# Patient Record
Sex: FEMALE | Race: WHITE | NOT HISPANIC OR LATINO | Employment: FULL TIME | URBAN - METROPOLITAN AREA
[De-identification: names, ages, dates, MRNs, and addresses within clinical notes are randomized per-mention and may not be internally consistent; named-entity substitution may affect disease eponyms.]

---

## 2018-10-02 LAB
ABO + RH BLD: NORMAL
BLD GP AB SCN SERPL QL: NEGATIVE
C TRACH DNA SPEC QL PROBE+SIG AMP: NEGATIVE
HBV SURFACE AG SERPL QL IA: NONREACTIVE
HEMOGLOBIN: 12.4 G/DL (ref 11.7–15.7)
HIV 1+2 AB+HIV1 P24 AG SERPL QL IA: NONREACTIVE
PLATELET # BLD AUTO: 223 10^9/L
RUBELLA ABY IGG: NORMAL
TREPONEMA ANTIBODIES: NEGATIVE

## 2019-01-17 ENCOUNTER — PRENATAL OFFICE VISIT (OUTPATIENT)
Dept: MIDWIFE SERVICES | Facility: CLINIC | Age: 34
End: 2019-01-17
Payer: COMMERCIAL

## 2019-01-17 VITALS
WEIGHT: 132.2 LBS | DIASTOLIC BLOOD PRESSURE: 62 MMHG | HEIGHT: 64 IN | SYSTOLIC BLOOD PRESSURE: 110 MMHG | BODY MASS INDEX: 22.57 KG/M2

## 2019-01-17 DIAGNOSIS — Z29.13 NEED FOR RHOGAM DUE TO RH NEGATIVE MOTHER: ICD-10-CM

## 2019-01-17 DIAGNOSIS — O09.92 SUPERVISION OF HIGH RISK PREGNANCY IN SECOND TRIMESTER: Primary | ICD-10-CM

## 2019-01-17 DIAGNOSIS — K50.90 CROHN'S DISEASE WITHOUT COMPLICATION, UNSPECIFIED GASTROINTESTINAL TRACT LOCATION (H): ICD-10-CM

## 2019-01-17 DIAGNOSIS — Q76.0 SPINA BIFIDA OCCULTA: ICD-10-CM

## 2019-01-17 DIAGNOSIS — Z20.828 EXPOSURE TO CYTOMEGALOVIRUS (CMV): ICD-10-CM

## 2019-01-17 DIAGNOSIS — M43.10 SPONDYLOLISTHESIS, UNSPECIFIED SPINAL REGION: ICD-10-CM

## 2019-01-17 DIAGNOSIS — Z23 NEED FOR TDAP VACCINATION: ICD-10-CM

## 2019-01-17 DIAGNOSIS — Z87.898 HISTORY OF ULCER DISEASE: ICD-10-CM

## 2019-01-17 DIAGNOSIS — Z3A.24 24 WEEKS GESTATION OF PREGNANCY: ICD-10-CM

## 2019-01-17 PROBLEM — Z12.4 CERVICAL CANCER SCREENING: Status: ACTIVE | Noted: 2019-01-17

## 2019-01-17 PROBLEM — Z34.00 SUPERVISION OF NORMAL IUP (INTRAUTERINE PREGNANCY) IN PRIMIGRAVIDA: Status: ACTIVE | Noted: 2019-01-17

## 2019-01-17 PROCEDURE — 86644 CMV ANTIBODY: CPT | Performed by: ADVANCED PRACTICE MIDWIFE

## 2019-01-17 PROCEDURE — 36415 COLL VENOUS BLD VENIPUNCTURE: CPT | Performed by: ADVANCED PRACTICE MIDWIFE

## 2019-01-17 PROCEDURE — 86645 CMV ANTIBODY IGM: CPT | Performed by: ADVANCED PRACTICE MIDWIFE

## 2019-01-17 PROCEDURE — 99207 ZZC FIRST OB VISIT: CPT | Performed by: ADVANCED PRACTICE MIDWIFE

## 2019-01-17 RX ORDER — CHLORAL HYDRATE 500 MG
1 CAPSULE ORAL DAILY
COMMUNITY
End: 2020-05-18

## 2019-01-17 SDOH — HEALTH STABILITY: MENTAL HEALTH: HOW OFTEN DO YOU HAVE A DRINK CONTAINING ALCOHOL?: NEVER

## 2019-01-17 ASSESSMENT — MIFFLIN-ST. JEOR: SCORE: 1294.66

## 2019-01-17 ASSESSMENT — PATIENT HEALTH QUESTIONNAIRE - PHQ9
5. POOR APPETITE OR OVEREATING: NOT AT ALL
SUM OF ALL RESPONSES TO PHQ QUESTIONS 1-9: 0

## 2019-01-17 ASSESSMENT — ANXIETY QUESTIONNAIRES
3. WORRYING TOO MUCH ABOUT DIFFERENT THINGS: NOT AT ALL
6. BECOMING EASILY ANNOYED OR IRRITABLE: NOT AT ALL
7. FEELING AFRAID AS IF SOMETHING AWFUL MIGHT HAPPEN: NOT AT ALL
5. BEING SO RESTLESS THAT IT IS HARD TO SIT STILL: NOT AT ALL
1. FEELING NERVOUS, ANXIOUS, OR ON EDGE: NOT AT ALL
IF YOU CHECKED OFF ANY PROBLEMS ON THIS QUESTIONNAIRE, HOW DIFFICULT HAVE THESE PROBLEMS MADE IT FOR YOU TO DO YOUR WORK, TAKE CARE OF THINGS AT HOME, OR GET ALONG WITH OTHER PEOPLE: NOT DIFFICULT AT ALL
2. NOT BEING ABLE TO STOP OR CONTROL WORRYING: NOT AT ALL
GAD7 TOTAL SCORE: 0

## 2019-01-17 NOTE — PATIENT INSTRUCTIONS
Thank you for coming to see the Midwives at the   [facility]!      We will notify you about your labs that were drawn today once we get the results back.  If you have [facility] your lab results will be posted there.      Someone from the clinic will call you personally or send you a [employer] message with your results.      If you need any refills of medications please call your pharmacy and they will contact us.      If you have a medical emergency please call 911.      If you have any concerns about today's visit, wish to schedule another appointment, or have an urgent medical concern please call our office at [phone]. You can also make appointments through [employer].      After hours you may also call the clinic number above to be connected with [place]'s after hours triage nurse.  The nurse can page the midwife on call if needed. There is always a midwife on call 24 hours a day.    Prenatal Care Recommendations:    Before 14 weeks: Dating ultrasound, genetic testing       This ultrasound helps us determine your dates accurately. Innatal (genetic screening test) can be drawn anytime after 10 weeks of gestation.    16 weeks: Optional genetic testing single AFP       This testing helps understand your baby's risk for some genetic abnormalities.    18-22 weeks:  Screening anatomy ultrasound       This testing will look for early growth abnormalities, placenta location, and may tell the baby's gender if you wish to find out.    24-27 weeks: One hour diabetes test (GCT) and complete blood count       This test helps identify diabetes of pregnancy or gestational diabetes.  We also look   at the iron in your blood and how well your blood clots.    28 - 36 weeks: Tetanus shot (Tdap)       This shot helps protect you and your baby from whooping cough.    36 weeks and later: Group B Strep test (GBS)       This test helps predict if you need antibiotics in labor to prevent infection for your baby.    Anytime  September to April:  Flu shot       This shot helps protect you and your family from the flu.  This is especially important during pregnancy.        The typical schedule after your first visit today you can expect:     Visit 2 - 12-16 weeks  Visit 3 - 20 weeks  Visit 4 - 24 weeks  Visit 5 - 28 weeks  Visit 6 - 30 weeks  Visit 7 - 32 weeks  Visit 8 - 34 weeks  Visit 9 - 36 weeks  Weekly after 36 weeks until delivery.        Any time during or after your pregnancy you may experience increased depression and/or mood changes.    We are here to support you. Please contact us if you are:    Feeling anxious    Overwhelmed or sad     Trouble sleeping    Crying uncontrollably    Trouble caring for yourself or baby.    Any thoughts of hurting yourself, your baby, or anyone else    If anything comes up between your visits or you have concerns please don't hesitate to contact us.    Secure access to your medical record:  Use [employer] (secure email communication and access to your chart) to send your primary care provider a message or make an appointment. Ask someone on your Team how to sign up for Storehouse. To log on to Thanx or for more information in Storehouse please visit the website at [email or web address].       Certified Nurse Midwife (CNM) Team    Emperatriz NUÑEZ, SARBJIT Santillan DNP, JOAQUIN, SARBJIT NUÑEZ, SARBJIT Houston, APRN, CNM, [facility]  Jhonny Sheridan, PEDRO, APRN, CNM      Again, thank you for choosing the midwives at [facility].  We are excited to be a part of your pregnancy. Please let us know how we can best partner with you to improve your and your family's health.        SIGNS OF  LABOR    Labor is  if it happens more than three weeks before your due date.    It can be hard to know if you are in labor, since the symptoms can be like the normal feelings of pregnancy.  Often, the only difference is the symptoms increase or they don't go away.     Signs of   labor can include:      Contractions which can feel like period cramps or gas pain.  You may feel it in the lower part of your abdomen, in your back, or as a pressure feeling in your bottom.  It is often regular, coming every 5 or 10 minutes, and  lasting about 30-60 seconds. Some contractions are normal during pregnancy ([place] uriarte contractions) but if you are feeling more than 5-6 in one hour that is NOT normal    If this occurs empty your bladder, then drink 2-3 glasses of water, eat a snack, and lay down on your left side. Put your hand on your abdomen to count the contractions.  If after one hour of resting you have still had 5-6 contractions call your clinic right away.      If you feel a pop, gush, or trickle of fluid it may mean that your bag of water has broken and you should contact the clinic       You may also experience loose stools and/or rectal pressure       Listen to your body, if something doesn't seem right please call us at the clinic    Risk Factors      Previous  delivery    Bacterial Vaginosis- if you notice a fishy smell to your discharge or experience vaginal itching/discomfort you should be evaluated for infection    Smoking    Drug abuse    Adolescent (teen) pregnancy or advanced maternal age (AMA) age 35 and over    Dehydration (this may not cause  labor but it can cause contractions)    If you think you are in  labor we may do some lab testing in the clinic or send you to the hospital for evaluation    Please call us if you are concerned you are in  labor.    [facility]  [phone]          GESTATIONAL DIABETES SCREENING    All pregnant women are screened at least once for diabetes as part of their prenatal care. A woman has gestational diabetes if she has high blood sugars for the first time during pregnancy.      Diabetes can harm your health and the health of your baby.  But if we find the diabetes early in pregnancy we will  watch your health closely and prevent further problems.       We will check for gestational diabetes during your visit between 24-28 weeks visit. Please note you can not do this prior to 24 weeks of gestation.      Plan to spend about an hour at the clinic.  When you check in let us know that you will be having your diabetes screening that day.       We will give you a 50 gram glucose drink that you have 5 minutes to consume.  Exactly one hour later you will have draw blood from your arm to check your blood sugar level.      We will call you to let you know if your results are normal.  If the results are normal no more testing will be needed.  If your results are not normal we will discuss follow up testing with you.        You may eat prior to the testing but it is not recommended to eat or drink very sweet things such as pop, juice, candy or dessert type foods.  Eat a high protein, low carb meals prior to testing.    If you have any questions please call:    [facility]    [phone]

## 2019-01-17 NOTE — NURSING NOTE
Please abstract the following data from this visit with this patient into the appropriate field in Epic:    Pap smear done on this date: 5/18/2016 (approximately), by this group: [facility], results were NIL.     Please update chart with care everywhere lab results.  Routing to HIM

## 2019-01-17 NOTE — PROGRESS NOTES
"SUBJECTIVE:     HPI:    This is a 33 year old female patient,  who presents for her first obstetrical visit.    WOLF: 2019, by Last Menstrual Period.  She is 24w4d weeks.  Her cycles are regular.  Her last menstrual period was normal.  Since her LMP, she has experienced  constipation and upper GI-\"stuck in her throat\").   She denies nausea, emesis, abdominal pain, fatigue, headache, loss of appetite, vaginal discharge, dysuria, pelvic pain, urinary urgency, lightheadedness, urinary frequency, vaginal bleeding and hemorrhoids.    Additional History: Pt has Spina bifida occulta.    Last PAP:  2016, NIL without cotest  History of abnormal Pap?  No  Next PAP due: 2019, will collect postpartum    Health maintenance updated:  yes    INFECTION HISTORY  HIV: No  Hepatitis B: No  Hepatitis C: No  Tuberculosis: No    Genital Herpes self: no  Herpes partner:  no  Chlamydia:  no  Gonorrhea:  no  HPV: No  BV:  No  Syphilis:  No  Chicken Pox:  Yes - as a child      Feels well  Fetal movement: positive   Denies loss of fluid/vb/contractions  GCT next visit, handout provided, reminded of longer appointment  Tdap next visit; reviewed CDC recommendations and partner/family vaccination recommended as well  Water birth discussed, patient is somewhat interested  Need for Rhogam? Yes, to be done next visit       Have you travelled during the pregnancy?No  Have your sexual partner(s) travelled during the pregnancy?No      HISTORY:   Planned Pregnancy: Yes  Marital Status:   Occupation: RN at [employer]  Living in Household: Spouse    Past History:  Her past medical history   Past Medical History:   Diagnosis Date     Crohn's disease (H)      Spina bifida occulta 2009     Spondylisthesis 2009     Tremor 2017     Ulcer of jejunum 10/2008   .      She has a history of  First Pregnancy    Since her last LMP she denies use of alcohol, tobacco and street drugs.    Past medical, surgical, social and family " "history were reviewed and updated in [facility].        Current Outpatient Medications   Medication     fish oil-omega-3 fatty acids (OMEGA-3 FISH OIL) 1000 MG capsule     Prenatal Multivit-Min-Fe-FA (PRENATAL VITAMINS PO)     No current facility-administered medications for this visit.        ROS:   12 point review of systems negative other than symptoms noted below.  Gastrointestinal: Constipation      OBJECTIVE:     EXAM:  /62   Ht 1.626 m (5' 4\")   Wt 60 kg (132 lb 3.2 oz)   LMP 2018   BMI 22.69 kg/m   Body mass index is 22.69 kg/m .    GENERAL: healthy, alert and no distress  NECK: no adenopathy, no asymmetry, masses, or scars and thyroid normal to palpation  RESP: lungs clear to auscultation - no rales, rhonchi or wheezes  CV: regular rate and rhythm, normal S1 S2, no S3 or S4, no murmur, click or rub, no peripheral edema   ABDOMEN: gravid  NEURO: Normal strength and tone, mentation intact and speech normal  PSYCH: mentation appears normal, affect normal/bright    ASSESSMENT/PLAN:       ICD-10-CM    1. Supervision of high risk pregnancy in second trimester O09.92    2. 24 weeks gestation of pregnancy Z3A.24    3. Spina bifida occulta Q76.0    4. Crohn's disease without complication, unspecified gastrointestinal tract location (H) K50.90    5. Spondylolisthesis, unspecified spinal region M43.10    6. History of ulcer disease Z87.898    7. Need for Tdap vaccination Z23    8. Exposure to cytomegalovirus (CMV) Z20.828 CMV Antibody IgG     CMV antibody IgM       33 year old , 24w4d weeks of pregnancy with WOLF of 2019, by Last Menstrual Period    Discussed as follows:   -Shiraz reports increased nosebleeds in pregnancy.  Discussed increased epistaxis during pregnancy and recommended humidified air in home.  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 Has not had symptoms. CMV IgG and IgM testing ordered.   -Shiraz is unsure about pain management plans in labor " "at this time.  Discussed that we will plan an early anesthesia consult in labor regardless of pain management plans.  -She states that, at times, she randomly feels like she has \"something stuck\" in her throat.  \"I'm unsure if it's heartburn, I've never had heartburn before\".  Resolves spontaneously.  Gave OB med list and recommended trying an antacid and monitoring symptoms.  -Discussed CNM service and how to get in touch with on-call CNM at any time.    Counseling given:   - Follow up in 3-4 weeks for return OB visit.  - Recommended weight gain for pregnancy: 25-35 lbs.  -Gave GCT handout  -Discussed PTL precautions and gave handout  -GCT, CBC, Rhogam and TDAP next visit      JOAQUIN Weinberg CNM    Prenatal OB Questionnaire      Allergies as of 1/17/2019:    Allergies as of 01/17/2019     (No Known Allergies)       Current medications are:  Current Outpatient Medications   Medication Sig Dispense Refill     fish oil-omega-3 fatty acids (OMEGA-3 FISH OIL) 1000 MG capsule Take 1 g by mouth daily       Prenatal Multivit-Min-Fe-FA (PRENATAL VITAMINS PO)            Early ultrasound screening tool:    Does patient have irregular periods?  No  Did patient use hormonal birth control in the three months prior to positive urine pregnancy test? No  Is the patient breastfeeding?  No  Is the patient 10 weeks or greater at time of education visit?  Yes     Had Level 2 US at 19w and normal  Transfer of Care from [facility] at 24w4d    "

## 2019-01-18 ASSESSMENT — ANXIETY QUESTIONNAIRES: GAD7 TOTAL SCORE: 0

## 2019-01-19 ENCOUNTER — NURSE TRIAGE (OUTPATIENT)
Dept: NURSING | Facility: CLINIC | Age: 34
End: 2019-01-19

## 2019-01-19 LAB
CMV IGG SERPL QL IA: 0.2 AI (ref 0–0.8)
CMV IGM SERPL QL IA: <0.2 AI (ref 0–0.8)

## 2019-01-19 NOTE — TELEPHONE ENCOUNTER
"  Reason for call; Pt received a call from Emperatriz matthew [facility] that Lab - CMV was negative but had follow up question.   Recommendation / teaching ; Smart web page at 129 to midwife Emperatriz Matthew :\" Please leave detailed message at [phone] Re :Shiraz Boykin MRN: [id number] BD 1985, WOLF 5/5/19 . ? on CMV lab = am I immune to CMV or not ? Lab CMV was neg but what does mean for immunity ?\"        Caller Verbalizes understanding and denies further questions and will call back if further symptoms to triage or questions  .  Chelsea Gregory RN  - [place] Nurse Advisor                  "

## 2019-01-29 DIAGNOSIS — Z29.13 NEED FOR RHOGAM DUE TO RH NEGATIVE MOTHER: Primary | ICD-10-CM

## 2019-01-29 DIAGNOSIS — Z23 NEED FOR TDAP VACCINATION: ICD-10-CM

## 2019-01-29 DIAGNOSIS — Z36.9 ENCOUNTER FOR ANTENATAL SCREENING OF MOTHER: ICD-10-CM
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
## 2019-02-05 ENCOUNTER — PRENATAL OFFICE VISIT (OUTPATIENT)
Dept: MIDWIFE SERVICES | Facility: CLINIC | Age: 34
End: 2019-02-05
Payer: COMMERCIAL

## 2019-02-05 VITALS — BODY MASS INDEX: 23.34 KG/M2 | WEIGHT: 136 LBS | SYSTOLIC BLOOD PRESSURE: 106 MMHG | DIASTOLIC BLOOD PRESSURE: 60 MMHG

## 2019-02-05 DIAGNOSIS — O09.92 SUPERVISION OF HIGH RISK PREGNANCY IN SECOND TRIMESTER: ICD-10-CM

## 2019-02-05 DIAGNOSIS — Z23 NEED FOR TDAP VACCINATION: ICD-10-CM

## 2019-02-05 DIAGNOSIS — Z3A.27 27 WEEKS GESTATION OF PREGNANCY: ICD-10-CM

## 2019-02-05 DIAGNOSIS — Z36.9 ENCOUNTER FOR ANTENATAL SCREENING OF MOTHER: ICD-10-CM

## 2019-02-05 DIAGNOSIS — O09.92 SUPERVISION OF HIGH RISK PREGNANCY IN SECOND TRIMESTER: Primary | ICD-10-CM

## 2019-02-05 DIAGNOSIS — Z29.13 NEED FOR RHOGAM DUE TO RH NEGATIVE MOTHER: ICD-10-CM

## 2019-02-05 PROBLEM — O26.893 RH NEGATIVE STATE IN ANTEPARTUM PERIOD, THIRD TRIMESTER: Status: ACTIVE | Noted: 2019-01-17

## 2019-02-05 PROBLEM — Z67.91 RH NEGATIVE STATE IN ANTEPARTUM PERIOD, THIRD TRIMESTER: Status: ACTIVE | Noted: 2019-01-17

## 2019-02-05 LAB
BLD GP AB SCN SERPL QL: NORMAL
ERYTHROCYTE [DISTWIDTH] IN BLOOD BY AUTOMATED COUNT: 14 % (ref 10–15)
GLUCOSE 1H P 50 G GLC PO SERPL-MCNC: 138 MG/DL (ref 60–129)
HCT VFR BLD AUTO: 33.2 % (ref 35–47)
HGB BLD-MCNC: 11 G/DL (ref 11.7–15.7)
MCH RBC QN AUTO: 33 PG (ref 26.5–33)
MCHC RBC AUTO-ENTMCNC: 33.1 G/DL (ref 31.5–36.5)
MCV RBC AUTO: 100 FL (ref 78–100)
PLATELET # BLD AUTO: 230 10E9/L (ref 150–450)
RBC # BLD AUTO: 3.33 10E12/L (ref 3.8–5.2)
WBC # BLD AUTO: 9.8 10E9/L (ref 4–11)

## 2019-02-05 PROCEDURE — 86850 RBC ANTIBODY SCREEN: CPT | Performed by: ADVANCED PRACTICE MIDWIFE

## 2019-02-05 PROCEDURE — 99207 ZZC PRENATAL VISIT: CPT | Performed by: ADVANCED PRACTICE MIDWIFE

## 2019-02-05 PROCEDURE — 86780 TREPONEMA PALLIDUM: CPT | Performed by: ADVANCED PRACTICE MIDWIFE

## 2019-02-05 PROCEDURE — 96372 THER/PROPH/DIAG INJ SC/IM: CPT

## 2019-02-05 PROCEDURE — 85027 COMPLETE CBC AUTOMATED: CPT | Performed by: ADVANCED PRACTICE MIDWIFE

## 2019-02-05 PROCEDURE — 36415 COLL VENOUS BLD VENIPUNCTURE: CPT | Performed by: ADVANCED PRACTICE MIDWIFE

## 2019-02-05 PROCEDURE — 82950 GLUCOSE TEST: CPT | Performed by: ADVANCED PRACTICE MIDWIFE

## 2019-02-05 PROCEDURE — 86803 HEPATITIS C AB TEST: CPT | Performed by: ADVANCED PRACTICE MIDWIFE

## 2019-02-05 NOTE — PROGRESS NOTES
Syphilis is a sexually transmitted disease that can cause birth defects in the babies of untreated mothers. Every pregnant patient is tested for syphilis early in each pregnancy as part of the routine lab work. The [place] Department of [facility] has seen an increase in the rate of syphilis in [place]. The [facility] now recommends testing for syphilis 3 times during a pregnancy, the new prenatal visit, 28 weeks and when admitted for delivery. Patient accepts lab testing for syphilis.

## 2019-02-06 LAB
HCV AB SERPL QL IA: NONREACTIVE
T PALLIDUM AB SER QL: NONREACTIVE

## 2019-02-21 ENCOUNTER — PRENATAL OFFICE VISIT (OUTPATIENT)
Dept: MIDWIFE SERVICES | Facility: CLINIC | Age: 34
End: 2019-02-21
Payer: COMMERCIAL

## 2019-02-21 VITALS — SYSTOLIC BLOOD PRESSURE: 102 MMHG | BODY MASS INDEX: 24.03 KG/M2 | DIASTOLIC BLOOD PRESSURE: 66 MMHG | WEIGHT: 140 LBS

## 2019-02-21 DIAGNOSIS — O26.893 RH NEGATIVE STATE IN ANTEPARTUM PERIOD, THIRD TRIMESTER: ICD-10-CM

## 2019-02-21 DIAGNOSIS — Z23 NEED FOR TDAP VACCINATION: ICD-10-CM

## 2019-02-21 DIAGNOSIS — O09.93 SUPERVISION OF HIGH RISK PREGNANCY IN THIRD TRIMESTER: Primary | ICD-10-CM

## 2019-02-21 DIAGNOSIS — Z67.91 RH NEGATIVE STATE IN ANTEPARTUM PERIOD, THIRD TRIMESTER: ICD-10-CM

## 2019-02-21 DIAGNOSIS — Z3A.29 29 WEEKS GESTATION OF PREGNANCY: ICD-10-CM

## 2019-02-21 PROCEDURE — 99207 ZZC PRENATAL VISIT: CPT | Performed by: ADVANCED PRACTICE MIDWIFE

## 2019-02-21 NOTE — PROGRESS NOTES
Feels well, here with Markie today.  Fetal Movement: positive, denies loss of fluid/vb, no contractions.  Fetal kick counts reviewed and handout given  Reviewed PTL precautions and s/sx of preeclampsia; denies any S&S and aware of what to report  Shiraz wishes to wait on Tdap until next visit, traveling to [place] tomorrow. Discussed travel precautions and provided prenatal records.  Answered questions about family vaccination for Tdap and Flu. Markie is planning a Tdap booster, as his last one was 2012.    Return to clinic in 2 weeks for CNM visit and Tdap.    Kyung Santillan, DNP, APRN, CNM

## 2019-02-21 NOTE — PATIENT INSTRUCTIONS
SIGNS OF  LABOR    Labor is  if it happens more than three weeks before your due date.    It can be hard to know if you are in labor, since the symptoms can be like the normal feelings of pregnancy.  Often, the only difference is the symptoms increase or they don't go away.     Signs of  labor can include:      Contractions which can feel like period cramps or gas pain.  You may feel it in the lower part of your abdomen, in your back, or as a pressure feeling in your bottom.  It is often regular, coming every 5 or 10 minutes, and  lasting about 30-60 seconds. Some contractions are normal during pregnancy ([place] uriarte contractions) but if you are feeling more than 5-6 in one hour that is NOT normal    If this occurs empty your bladder, then drink 2-3 glasses of water, eat a snack, and lay down on your left side. Put your hand on your abdomen to count the contractions.  If after one hour of resting you have still had 5-6 contractions call your clinic right away.      If you feel a pop, gush, or trickle of fluid it may mean that your bag of water has broken and you should contact the clinic       You may also experience loose stools and/or rectal pressure       Listen to your body, if something doesn't seem right please call us at the clinic    Risk Factors      Previous  delivery    Bacterial Vaginosis- if you notice a fishy smell to your discharge or experience vaginal itching/discomfort you should be evaluated for infection    Smoking    Drug abuse    Adolescent (teen) pregnancy or advanced maternal age (AMA) age 35 and over    Dehydration (this may not cause  labor but it can cause contractions)    If you think you are in  labor we may do some lab testing in the clinic or send you to the hospital for evaluation    Please call us if you are concerned you are in  labor.    [facility]  [phone]      Fetal Kick Counts    It is important to know when  your baby's movements occur. We often get busy with work and life and do not pay close attention to their movements.        Women typically begin feeling movement between 18-22 weeks of gestation, sometimes it can be earlier or later depending on where your placenta is       Movements usually begin feeling like popping or fluttering and as the baby grows they become more pronounced    Toward the end of pregnancy as the baby gets larger they may not move as much or make as big of movements. Babies have maturing sleep cycles as well as not as much room to move and flip. If you are ever concerned about your baby's movements or have not felt the baby move for a while, we recommend you do a fetal kick count. Prior to starting your count drink a glass of water or juice and eat a snack. Then lay down on your side and begin to count movements.     How to do a Fetal Kick Counts    There are many different ways to monitor your baby's movements. Movements can range from large jabs to small kicks, or wiggles.  Hiccups count!      Count 10 movements in 2 hours when resting and focusing    Count 10 movements in 12 hours when doing normal activity    We recommend that if movements occur but seem decreased that you should be seen in the clinic or hospital for evaluation within 12 hours. If fetal movement is absent or fetal kick counts are low please contact us right away.    If you ever have any concerns about your baby's movements DO NOT HESITATE to call us, we are here for you!    [facility]  [phone]        PREECLAMPSIA SIGNS AND SYMPTOMS    Preeclampsia is a dangerous condition that some women develop in the second half of pregnancy. It can also begin after the baby is born.  Preeclampsia causes high blood pressure and can cause problems with many organ systems in your body.  It can also affect the growth of your baby. The exact cause of preeclampsia is unknown, however, there are signs and symptoms to watch  "for:    -A bad headache that doesn't improve with Tylenol  -Visual changes such as spots, flashes of light, blurry vision  -Pain in the upper right part of your abdomen, especially under the ribs that doesn't go away  -Nausea and/or vomiting  -Feeling extremely tired  -Yellowing of the skin and/or eyes  -Feeling \"not quite right\" or that something is wrong  -An extreme amount of swelling (some swelling in pregnancy is very normal)    If your midwife feels that you are developing preeclampsia, you will have lab tests drawn and will be monitored very closely.     If you are experiencing anyof these symptoms, call the [facility] immediately at [phone].  "

## 2019-03-12 ENCOUNTER — PRENATAL OFFICE VISIT (OUTPATIENT)
Dept: MIDWIFE SERVICES | Facility: CLINIC | Age: 34
End: 2019-03-12
Payer: COMMERCIAL

## 2019-03-12 VITALS — SYSTOLIC BLOOD PRESSURE: 112 MMHG | BODY MASS INDEX: 24.89 KG/M2 | DIASTOLIC BLOOD PRESSURE: 64 MMHG | WEIGHT: 145 LBS

## 2019-03-12 DIAGNOSIS — Z23 NEED FOR TDAP VACCINATION: ICD-10-CM

## 2019-03-12 DIAGNOSIS — O09.93 SUPERVISION OF HIGH RISK PREGNANCY IN THIRD TRIMESTER: Primary | ICD-10-CM

## 2019-03-12 DIAGNOSIS — Z3A.32 32 WEEKS GESTATION OF PREGNANCY: ICD-10-CM

## 2019-03-12 PROCEDURE — 90715 TDAP VACCINE 7 YRS/> IM: CPT | Performed by: ADVANCED PRACTICE MIDWIFE

## 2019-03-12 PROCEDURE — 90471 IMMUNIZATION ADMIN: CPT | Performed by: ADVANCED PRACTICE MIDWIFE

## 2019-03-12 PROCEDURE — 99207 ZZC PRENATAL VISIT: CPT | Performed by: ADVANCED PRACTICE MIDWIFE

## 2019-03-12 NOTE — PROGRESS NOTES
Feels good.  present for appointment today. Questions regarding frequent bloody noses.   Discussed increased vascularity is a normal physiologic adaptation in pregnancy and bloody noses are normal.   Recommended Urgent care or ED if bleeding lasts longer than 15 min or if interfering with daily living.   Tdap given today. He also plans booster along with their moms who will also be close car givers.  Waterbirth consent signed  Fetal Movement: positive, denies loss of fluid/vb, no contractions  Fetal kick counts/movement reviewed  Reviewed PTL precautions and s/sx of preeclampsia; denies any S&S and aware of what to report  Peds chosen: Not yet, will be checking with insurance, they live in [place] and would like someone close  Pediatrician: Hep B, Vit K, Erythromycin   Plans to breastfeed Yes  Breastfeeding class planned Yes   Return to clinic 2 weeks    LIVE Cannon   [facility]    I, LIVE Cannon, am serving as a scribe; to document services personally performed by Lina NUÑEZ CNM- based on data collection and the provider's statements to me.    Provider Disclosure:  I agree with above History, Review of Systems, Physical exam and Plan. I have reviewed the content of the documentation and have edited it as needed. I have personally performed the services documented here and the documentation accurately represents those services and the decisions I have made.    JOAQUIN Powell, SARBJIT

## 2019-03-12 NOTE — PROGRESS NOTES
Screening Questionnaire for Adult Immunization    Are you sick today?   No   Do you have allergies to medications, food, a vaccine component or latex?   No   Have you ever had a serious reaction after receiving a vaccination?   No   Do you have a long-term health problem with heart disease, lung disease, asthma, kidney disease, metabolic disease (e.g. diabetes), anemia, or other blood disorder?   No   Do you have cancer, leukemia, HIV/AIDS, or any other immune system problem?   No   In the past 3 months, have you taken medications that affect  your immune system, such as prednisone, other steroids, or anticancer drugs; drugs for the treatment of rheumatoid arthritis, Crohn s disease, or psoriasis; or have you had radiation treatments?   No   Have you had a seizure, or a brain or other nervous system problem?   No   During the past year, have you received a transfusion of blood or blood     products, or been given immune (gamma) globulin or antiviral drug?   No   For women: Are you pregnant or is there a chance you could become        pregnant during the next month?   Yes   Have you received any vaccinations in the past 4 weeks?   No     Immunization questionnaire answers were all negative, except one. Emperatriz cornelius.       Per orders of Emperatriz Herman CNM, injection of Tdap given by Yvette Talamantes. Patient instructed to remain in clinic for 15 minutes afterwards, and to report any adverse reaction to me immediately.       Screening performed by Yvette Talamantes on 3/12/2019 at 9:28 AM.     Yes

## 2019-03-25 NOTE — PROGRESS NOTES
Feels well but tired, having some hip pain, going to chiropractor, would like rx for pregnancy support and also got assigned jury duty for May and needs a letter with due date to move her jury duty  Fetal Movement: positive, denies loss of fluid/vb, no  contractions  Fetal kick counts/movement reviewed  Reviewed PTL precautions and s/sx of preeclampsia; denies any S&S and aware of what to report  Briefly discussed birth preferences, plans to keep an open mind, goal to have a baby and go home with her, would maybe like to try nitrous, maybe H2O, maybe epidural, she is open to all  Depression screening done-yes  Baby Box given:  Yes  Taking hospital tour?  Yes-planning to take   Have car seat-Yes  Discussed GBS/cx check at next visit  Return to clinic 2 weeks    JOAQUIN Powell, MERRYM

## 2019-03-29 ENCOUNTER — PRENATAL OFFICE VISIT (OUTPATIENT)
Dept: MIDWIFE SERVICES | Facility: CLINIC | Age: 34
End: 2019-03-29
Payer: COMMERCIAL

## 2019-03-29 VITALS — DIASTOLIC BLOOD PRESSURE: 66 MMHG | SYSTOLIC BLOOD PRESSURE: 114 MMHG | BODY MASS INDEX: 25.06 KG/M2 | WEIGHT: 146 LBS

## 2019-03-29 DIAGNOSIS — Z3A.34 34 WEEKS GESTATION OF PREGNANCY: ICD-10-CM

## 2019-03-29 DIAGNOSIS — M25.551 PAIN OF BOTH HIP JOINTS: ICD-10-CM

## 2019-03-29 DIAGNOSIS — M43.16 SPONDYLOLISTHESIS OF LUMBAR REGION: ICD-10-CM

## 2019-03-29 DIAGNOSIS — M25.552 PAIN OF BOTH HIP JOINTS: ICD-10-CM

## 2019-03-29 DIAGNOSIS — O09.93 SUPERVISION OF HIGH RISK PREGNANCY IN THIRD TRIMESTER: Primary | ICD-10-CM

## 2019-03-29 DIAGNOSIS — Q76.0 SPINA BIFIDA OCCULTA: ICD-10-CM

## 2019-03-29 PROCEDURE — 99207 ZZC PRENATAL VISIT: CPT | Performed by: ADVANCED PRACTICE MIDWIFE

## 2019-03-29 NOTE — LETTER
[facility] [place]  [address]  [place] [address]  [phone]  Dept: [phone]    3/29/2019  To whom it may concern,      Shiraz Boykin is 34w5d pregnant today with a due date of 5/5/2019. She is being seen for prenatal care at [facility] and planning delivery at [facility]. Please contact us if you have any questions or concerns.              JOAQUIN Powell, MERRYM

## 2019-04-08 PROBLEM — Z23 NEED FOR TDAP VACCINATION: Status: RESOLVED | Noted: 2019-01-17 | Resolved: 2019-04-08

## 2019-04-11 ENCOUNTER — PRENATAL OFFICE VISIT (OUTPATIENT)
Dept: MIDWIFE SERVICES | Facility: CLINIC | Age: 34
End: 2019-04-11
Payer: COMMERCIAL

## 2019-04-11 VITALS — WEIGHT: 150 LBS | DIASTOLIC BLOOD PRESSURE: 70 MMHG | SYSTOLIC BLOOD PRESSURE: 108 MMHG | BODY MASS INDEX: 25.75 KG/M2

## 2019-04-11 DIAGNOSIS — O26.893 RH NEGATIVE STATE IN ANTEPARTUM PERIOD, THIRD TRIMESTER: ICD-10-CM

## 2019-04-11 DIAGNOSIS — Z67.91 RH NEGATIVE STATE IN ANTEPARTUM PERIOD, THIRD TRIMESTER: ICD-10-CM

## 2019-04-11 DIAGNOSIS — O09.93 SUPERVISION OF HIGH RISK PREGNANCY IN THIRD TRIMESTER: Primary | ICD-10-CM

## 2019-04-11 DIAGNOSIS — Z3A.36 36 WEEKS GESTATION OF PREGNANCY: ICD-10-CM

## 2019-04-11 DIAGNOSIS — Z36.85 SCREENING, ANTENATAL, FOR STREPTOCOCCUS B: ICD-10-CM

## 2019-04-11 DIAGNOSIS — Z78.9 EXCLUSIVELY BREASTFEED INFANT: ICD-10-CM

## 2019-04-11 PROCEDURE — 99207 ZZC PRENATAL VISIT: CPT | Performed by: ADVANCED PRACTICE MIDWIFE

## 2019-04-11 PROCEDURE — 87653 STREP B DNA AMP PROBE: CPT | Performed by: ADVANCED PRACTICE MIDWIFE

## 2019-04-11 NOTE — PATIENT INSTRUCTIONS
"Labor Instructions for Midwife Patients    When to call:  Both during and after office hours call  [phone]. There is a triage RN to take your calls and answer your questions 24 hours a day.  If she cannot answer your question she will page the midwife on call for you.    When to call:  Call anytime you have important concerns about you or your baby.     Call if:    You are having contractions at regular intervals about 5-6 minutes apart lasting 30-60 seconds and becoming increasingly more intense     You have an uncontrollable gush of fluid from your vagina or feel a pop and gush like your water has broken    You have HEAVY bleeding, like heavy period, blood running down your legs, or  soaking a pad.     Some bleeding after a pelvic exam, after intercourse, or in labor when your cervix is dilating is normal and is referred to as \"bloody show\"    You have severe, continuous back or abdominal pain    You feel it is time to go to the hospital    If this is your first labor, call when contractions are very intense and have been about every 3-4 minutes for about an hour    If it is your second labor or more, call when contractions are strong and about every 3-5 minutes or sooner depending on your level of discomfort.     Keep in mind we are always here for you! If you have questions, concerns please don't hesitate to call us.     What to eat/drink in labor: Drink plenty of fluid (water most importantly, juice, soda or tea without caffeine). Eat rice, pasta, soup, cereal, bread/toast, and fruit. Avoid dairy and greasy food as they are difficult to digest and you may experience some nausea during labor.    Comfort measures:    Baths and showers (ok even with ruptured membranes, it may temporarily slow contractions if you are still in the early stage of labor)    Warm/hot packs for back pain or discomfort    Back, belly, or thigh massages    Standing, rocking, walking, leaning over bed or tables, side-lying and " sleeping    Miscellaneous:     Contractions are timed from the beginning of one to the beginning of the next    Try hard to sleep during the early stage of labor when you are not that uncomfortable. Timing of contractions at this point is not important    Even if you cannot sleep, resting in bed or on the couch can help you maintain your energy for labor    When you arrive at the hospital the nurse will check your baby's heartbeat, check your cervix, and will call us. The midwife on call will come in and be with you when you are in active labor    After hours you need to enter the hospital through the emergency room

## 2019-04-11 NOTE — PROGRESS NOTES
"Feels good, \"ready to be done\". No questions or concerns today.   Fetal movement: positive  Denies bleeding/lof, no contractions  GBS swab done today  Vaginal exam done, confirmed vertex via leopolds and SVE   Cx:  1/50/-2, posterior, soft    Labor instructions given  Labor preferences/birth plan reviewed yes   Breast pump Rx-yes  Pediatrician clinic information reviewed and contact information given   Warning signs reviewed  Patient denies S&S of pre-eclampsia and aware of what to report   Return to clinic 1 week    LIVE Cannon   [facility]    I, LIVE Cannon, am serving as a scribe; to document services personally performed by Lina NUÑEZ CNM- based on data collection and the provider's statements to me.    Provider Disclosure:  I agree with above History, Review of Systems, Physical exam and Plan. I have reviewed the content of the documentation and have edited it as needed. I have personally performed the services documented here and the documentation accurately represents those services and the decisions I have made.    JOAQUIN Powell, SARBJIT            "

## 2019-04-11 NOTE — PROGRESS NOTES
Feels well.   Fetal movement: positive  Denies bleeding/lof, no contractions    Labor instructions given  Warning signs reviewed  Patient denies S&S of pre-eclampsia and aware of what to report     Return to clinic 1 week    Laura NUÑEZ CNM

## 2019-04-12 LAB
GP B STREP DNA SPEC QL NAA+PROBE: NEGATIVE
SPECIMEN SOURCE: NORMAL

## 2019-04-12 NOTE — RESULT ENCOUNTER NOTE
Coco Weldon,    Your Group B Strep (GBS) results came back negative. No follow-up is needed.    Thank you,  Jhonny Sheridan, DNP, APRN, CNM

## 2019-04-16 ENCOUNTER — PRENATAL OFFICE VISIT (OUTPATIENT)
Dept: MIDWIFE SERVICES | Facility: CLINIC | Age: 34
End: 2019-04-16
Payer: COMMERCIAL

## 2019-04-16 VITALS — BODY MASS INDEX: 25.37 KG/M2 | WEIGHT: 147.8 LBS | SYSTOLIC BLOOD PRESSURE: 100 MMHG | DIASTOLIC BLOOD PRESSURE: 72 MMHG

## 2019-04-16 DIAGNOSIS — O09.93 SUPERVISION OF HIGH RISK PREGNANCY IN THIRD TRIMESTER: ICD-10-CM

## 2019-04-16 PROCEDURE — 99207 ZZC PRENATAL VISIT: CPT | Performed by: ADVANCED PRACTICE MIDWIFE

## 2019-04-16 NOTE — PATIENT INSTRUCTIONS
"PREECLAMPSIA SIGNS AND SYMPTOMS    Preeclampsia is a dangerous condition that some women develop in the second half of pregnancy. It can also begin after the baby is born.  Preeclampsia causes high blood pressure and can cause problems with many organ systems in your body.  It can also affect the growth of your baby. The exact cause of preeclampsia is unknown, however, there are signs and symptoms to watch for:    -A bad headache that doesn't improve with Tylenol  -Visual changes such as spots, flashes of light, blurry vision  -Pain in the upper right part of your abdomen, especially under the ribs that doesn't go away  -Nausea and/or vomiting  -Feeling extremely tired  -Yellowing of the skin and/or eyes  -Feeling \"not quite right\" or that something is wrong  -An extreme amount of swelling (some swelling in pregnancy is very normal)    If your midwife feels that you are developing preeclampsia, you will have lab tests drawn and will be monitored very closely.     If you are experiencing anyof these symptoms, call the [facility] immediately at [phone].    Labor Instructions for Midwife Patients    When to call:  Both during and after office hours call  [phone]. There is a triage RN to take your calls and answer your questions 24 hours a day.  If she cannot answer your question she will page the midwife on call for you.    When to call:  Call anytime you have important concerns about you or your baby.     Call if:    You are having contractions at regular intervals about 5-6 minutes apart lasting 30-60 seconds and becoming increasingly more intense     You have an uncontrollable gush of fluid from your vagina or feel a pop and gush like your water has broken    You have HEAVY bleeding, like heavy period, blood running down your legs, or  soaking a pad.     Some bleeding after a pelvic exam, after intercourse, or in labor when your cervix is dilating is normal and is referred to as \"bloody " "show\"    You have severe, continuous back or abdominal pain    You feel it is time to go to the hospital    If this is your first labor, call when contractions are very intense and have been about every 3-4 minutes for about an hour    If it is your second labor or more, call when contractions are strong and about every 3-5 minutes or sooner depending on your level of discomfort.     Keep in mind we are always here for you! If you have questions, concerns please don't hesitate to call us.     What to eat/drink in labor: Drink plenty of fluid (water most importantly, juice, soda or tea without caffeine). Eat rice, pasta, soup, cereal, bread/toast, and fruit. Avoid dairy and greasy food as they are difficult to digest and you may experience some nausea during labor.    Comfort measures:    Baths and showers (ok even with ruptured membranes, it may temporarily slow contractions if you are still in the early stage of labor)    Warm/hot packs for back pain or discomfort    Back, belly, or thigh massages    Standing, rocking, walking, leaning over bed or tables, side-lying and sleeping    Miscellaneous:     Contractions are timed from the beginning of one to the beginning of the next    Try hard to sleep during the early stage of labor when you are not that uncomfortable. Timing of contractions at this point is not important    Even if you cannot sleep, resting in bed or on the couch can help you maintain your energy for labor    When you arrive at the hospital the nurse will check your baby's heartbeat, check your cervix, and will call us. The midwife on call will come in and be with you when you are in active labor    After hours you need to enter the hospital through the emergency room    Fetal Kick Counts    It is important to know when your baby's movements occur. We often get busy with work and life and do not pay close attention to their movements.        Women typically begin feeling movement between 18-22 weeks " of gestation, sometimes it can be earlier or later depending on where your placenta is       Movements usually begin feeling like popping or fluttering and as the baby grows they become more pronounced    Toward the end of pregnancy as the baby gets larger they may not move as much or make as big of movements. Babies have maturing sleep cycles as well as not as much room to move and flip. If you are ever concerned about your baby's movements or have not felt the baby move for a while, we recommend you do a fetal kick count. Prior to starting your count drink a glass of water or juice and eat a snack. Then lay down on your side and begin to count movements.     How to do a Fetal Kick Counts    There are many different ways to monitor your baby's movements. Movements can range from large jabs to small kicks, or wiggles.  Hiccups count!      Count 10 movements in 2 hours when resting and focusing    Count 10 movements in 12 hours when doing normal activity    We recommend that if movements occur but seem decreased that you should be seen in the clinic or hospital for evaluation within 12 hours. If fetal movement is absent or fetal kick counts are low please contact us right away.    If you ever have any concerns about your baby's movements DO NOT HESITATE to call us, we are here for you!    [facility]  [phone]

## 2019-04-25 ENCOUNTER — PRENATAL OFFICE VISIT (OUTPATIENT)
Dept: MIDWIFE SERVICES | Facility: CLINIC | Age: 34
End: 2019-04-25
Payer: COMMERCIAL

## 2019-04-25 VITALS — DIASTOLIC BLOOD PRESSURE: 76 MMHG | SYSTOLIC BLOOD PRESSURE: 116 MMHG | WEIGHT: 150 LBS | BODY MASS INDEX: 25.75 KG/M2

## 2019-04-25 DIAGNOSIS — O09.93 SUPERVISION OF HIGH RISK PREGNANCY IN THIRD TRIMESTER: ICD-10-CM

## 2019-04-25 DIAGNOSIS — Z3A.38 38 WEEKS GESTATION OF PREGNANCY: Primary | ICD-10-CM

## 2019-04-25 PROCEDURE — 59426 ANTEPARTUM CARE ONLY: CPT | Performed by: ADVANCED PRACTICE MIDWIFE

## 2019-04-25 PROCEDURE — 99207 ZZC PRENATAL VISIT: CPT | Performed by: ADVANCED PRACTICE MIDWIFE

## 2019-04-25 NOTE — PATIENT INSTRUCTIONS
"Labor Instructions for Midwife Patients    When to call:  Both during and after office hours call  [phone]. There is a triage RN to take your calls and answer your questions 24 hours a day.  If she cannot answer your question she will page the midwife on call for you.    When to call:  Call anytime you have important concerns about you or your baby.     Call if:    You are having contractions at regular intervals about 5-6 minutes apart lasting 30-60 seconds and becoming increasingly more intense     You have an uncontrollable gush of fluid from your vagina or feel a pop and gush like your water has broken    You have HEAVY bleeding, like heavy period, blood running down your legs, or  soaking a pad.     Some bleeding after a pelvic exam, after intercourse, or in labor when your cervix is dilating is normal and is referred to as \"bloody show\"    You have severe, continuous back or abdominal pain    You feel it is time to go to the hospital    If this is your first labor, call when contractions are very intense and have been about every 3-4 minutes for about an hour    If it is your second labor or more, call when contractions are strong and about every 3-5 minutes or sooner depending on your level of discomfort.     Keep in mind we are always here for you! If you have questions, concerns please don't hesitate to call us.     What to eat/drink in labor: Drink plenty of fluid (water most importantly, juice, soda or tea without caffeine). Eat rice, pasta, soup, cereal, bread/toast, and fruit. Avoid dairy and greasy food as they are difficult to digest and you may experience some nausea during labor.    Comfort measures:    Baths and showers (ok even with ruptured membranes, it may temporarily slow contractions if you are still in the early stage of labor)    Warm/hot packs for back pain or discomfort    Back, belly, or thigh massages    Standing, rocking, walking, leaning over bed or tables, side-lying and " sleeping    Miscellaneous:     Contractions are timed from the beginning of one to the beginning of the next    Try hard to sleep during the early stage of labor when you are not that uncomfortable. Timing of contractions at this point is not important    Even if you cannot sleep, resting in bed or on the couch can help you maintain your energy for labor    When you arrive at the hospital the nurse will check your baby's heartbeat, check your cervix, and will call us. The midwife on call will come in and be with you when you are in active labor    After hours you need to enter the hospital through the emergency room        NATURAL LABOR PREPARATION    So, you're getting closer and closer to your due date and wondering what you can do to help get your body ready for labor.   Here are some natural methods you can try:    NOTE: DO NOT begin any of the following prior to 36 completed weeks of pregnancy!    Evening Primrose Oil: Take 1000mg by mouth three times per day. This encourages the cervix to ripen. Cervical ripening is when your cervix becomes more soft and stretchy to get ready for dilation and effacement.     Red Raspberry Leaf Tea: Red raspberry tea (get it at a Co-op or in the grocery store). Drink three cups per day (hot or cold). This can help to prepare the uterine muscles for labor.      Fetal Kick Counts    It is important to know when your baby's movements occur. We often get busy with work and life and do not pay close attention to their movements.        Women typically begin feeling movement between 18-22 weeks of gestation, sometimes it can be earlier or later depending on where your placenta is       Movements usually begin feeling like popping or fluttering and as the baby grows they become more pronounced    Toward the end of pregnancy as the baby gets larger they may not move as much or make as big of movements. Babies have maturing sleep cycles as well as not as much room to move and flip. If  "you are ever concerned about your baby's movements or have not felt the baby move for a while, we recommend you do a fetal kick count. Prior to starting your count drink a glass of water or juice and eat a snack. Then lay down on your side and begin to count movements.         How to do a Fetal Kick Counts    There are many different ways to monitor your baby's movements. Movements can range from large jabs to small kicks, or wiggles.  Hiccups count!      Count 10 movements in 2 hours when resting and focusing    Count 10 movements in 12 hours when doing normal activity    We recommend that if movements occur but seem decreased that you should be seen in the clinic or hospital for evaluation within 12 hours. If fetal movement is absent or fetal kick counts are low please contact us right away.    If you ever have any concerns about your baby's movements DO NOT HESITATE to call us, we are here for you!    [facility]  [phone]      PREECLAMPSIA SIGNS AND SYMPTOMS    Preeclampsia is a dangerous condition that some women develop in the second half of pregnancy. It can also begin after the baby is born.  Preeclampsia causes high blood pressure and can cause problems with many organ systems in your body.  It can also affect the growth of your baby. The exact cause of preeclampsia is unknown, however, there are signs and symptoms to watch for:    -A bad headache that doesn't improve with Tylenol  -Visual changes such as spots, flashes of light, blurry vision  -Pain in the upper right part of your abdomen, especially under the ribs that doesn't go away  -Nausea and/or vomiting  -Feeling extremely tired  -Yellowing of the skin and/or eyes  -Feeling \"not quite right\" or that something is wrong  -An extreme amount of swelling (some swelling in pregnancy is very normal)    If your midwife feels that you are developing preeclampsia, you will have lab tests drawn and will be monitored very closely.     If you " are experiencing anyof these symptoms, call the [facility] for Women immediately at [phone].

## 2019-04-25 NOTE — PROGRESS NOTES
Feels well overall. Has a roommate that was dx'd with Shingles. Dx'd Monday, but has had rash 3-4 days prior. He is on an antibiotics.   Discussed transmission is by direct contact. Advised to take precautions against that happening.   Fetal movement: positive  Denies vaginal bleeding/lof, no contractions  Warning signs reviewed   Labor signs and symptoms discussed, aware of numbers to call  Denies s/sx of pre-eclampsia and aware of what to report    Return to clinic 1 week    Laura NUÑEZ CNM

## 2019-04-27 ENCOUNTER — TELEPHONE (OUTPATIENT)
Dept: MIDWIFE SERVICES | Facility: CLINIC | Age: 34
End: 2019-04-27

## 2019-04-27 ENCOUNTER — NURSE TRIAGE (OUTPATIENT)
Dept: NURSING | Facility: CLINIC | Age: 34
End: 2019-04-27

## 2019-04-27 NOTE — TELEPHONE ENCOUNTER
Page received from [facility] regarding leaking of fluid.  Callback placed to Shiraz who states that at approximately 1700, she was getting dressed to go to a friend's party when she had a gush of fluid.  The fluid soaked her pantiliner, underwear and pants. She then went to the restroom and sat on the toilet, where she continued to have small gushes; fluid continues to leak in small amounts. Fluid is clear and non-odorous per Shiraz.  Shiraz denies any vaginal bleeding or contractions.  Baby has been active this afternoon. GBS negative.    Discussed SROM plan of care with Shiraz. Recommended that she be evaluated in MAC within 6-12 hours if contractions do not begin on their own. Discussed calling back with contractions, red vaginal bleeding, decreased fetal movement or other concerns/questions. Also discussed comfort measures and ways to encourage contractions, including warm bath, sitting on a yoga ball, going for a walk, rest and hydration. Plan made that CNM will call Shiraz back this evening around [phone] if Shiraz hasn't called with contractions by then.  Shiraz voiced comfort with the plan of care and denied further questions or concerns.     Kyung Lal, PEDRO, APRN, CNM

## 2019-04-27 NOTE — TELEPHONE ENCOUNTER
"Patient calling reporting her water broke. States she was in the bathroom and had \"gush of fluid\" come out. States she continues to have fluid leakage. Denies vaginal bleeding. Denies abdominal pain. Informed RN will page on call midwife to call patient directly per clinic protocol.    RN paged on call midwife BRANDEE PATTON CNM for [facility] at 5:01pm through smart web to call patient directly. Advised patient to call RN if no call from provider within 20 minutes.     Caller verbalized understanding. Denies further questions.      Mathew Weller RN  [facility]       Reason for Disposition    Leakage of fluid from vagina    Additional Information    Negative: [1] SEVERE abdominal pain (e.g., excruciating) AND [2] constant AND [3] present > 1 hour    Negative: Severe bleeding (e.g., continuous red blood from vagina, or large blood clots)    Negative: Umbilical cord hanging out of the vagina (shiny, white, curled appearance, \"like telephone cord\")    Negative: Uncontrollable urge to push (i.e., feels like baby is coming out now)    Negative: Can see baby    Negative: Sounds like a life-threatening emergency to the triager    Negative: < 20 weeks pregnant    Negative: Vaginal bleeding    Protocols used: PREGNANCY - RUPTURE OF MEMBRANES-ADULT-AH      "

## 2019-04-28 ENCOUNTER — ANESTHESIA EVENT (OUTPATIENT)
Dept: OBGYN | Facility: CLINIC | Age: 34
End: 2019-04-28
Payer: COMMERCIAL

## 2019-04-28 ENCOUNTER — ANESTHESIA (OUTPATIENT)
Dept: OBGYN | Facility: CLINIC | Age: 34
End: 2019-04-28
Payer: COMMERCIAL

## 2019-04-28 ENCOUNTER — HOSPITAL ENCOUNTER (INPATIENT)
Facility: CLINIC | Age: 34
LOS: 3 days | Discharge: HOME | End: 2019-05-01
Attending: ADVANCED PRACTICE MIDWIFE | Admitting: ADVANCED PRACTICE MIDWIFE
Payer: COMMERCIAL

## 2019-04-28 DIAGNOSIS — D62 ANEMIA DUE TO BLOOD LOSS, ACUTE: Chronic | ICD-10-CM

## 2019-04-28 PROBLEM — O26.90 PREGNANCY RELATED CONDITION: Status: ACTIVE | Noted: 2019-04-28

## 2019-04-28 LAB
ABO + RH BLD: NORMAL
ABO + RH BLD: NORMAL
HGB BLD-MCNC: 11.9 G/DL (ref 11.7–15.7)
PLATELET # BLD AUTO: 185 10E9/L (ref 150–450)
RUPTURE OF FETAL MEMBRANES BY ROM PLUS: POSITIVE
SPECIMEN EXP DATE BLD: NORMAL

## 2019-04-28 PROCEDURE — 86901 BLOOD TYPING SEROLOGIC RH(D): CPT | Performed by: ADVANCED PRACTICE MIDWIFE

## 2019-04-28 PROCEDURE — 3E0R3BZ INTRODUCTION OF ANESTHETIC AGENT INTO SPINAL CANAL, PERCUTANEOUS APPROACH: ICD-10-PCS | Performed by: ANESTHESIOLOGY

## 2019-04-28 PROCEDURE — 59025 FETAL NON-STRESS TEST: CPT | Mod: 26 | Performed by: ADVANCED PRACTICE MIDWIFE

## 2019-04-28 PROCEDURE — 84112 EVAL AMNIOTIC FLUID PROTEIN: CPT | Performed by: ADVANCED PRACTICE MIDWIFE

## 2019-04-28 PROCEDURE — 25000132 ZZH RX MED GY IP 250 OP 250 PS 637: Performed by: ADVANCED PRACTICE MIDWIFE

## 2019-04-28 PROCEDURE — 12000000 ZZH R&B MED SURG/OB

## 2019-04-28 PROCEDURE — 59025 FETAL NON-STRESS TEST: CPT

## 2019-04-28 PROCEDURE — 36415 COLL VENOUS BLD VENIPUNCTURE: CPT | Performed by: ADVANCED PRACTICE MIDWIFE

## 2019-04-28 PROCEDURE — G0463 HOSPITAL OUTPT CLINIC VISIT: HCPCS | Mod: 25

## 2019-04-28 PROCEDURE — 25000128 H RX IP 250 OP 636: Performed by: ANESTHESIOLOGY

## 2019-04-28 PROCEDURE — 27110038 ZZH RX 271: Performed by: ANESTHESIOLOGY

## 2019-04-28 PROCEDURE — 37000011 ZZH ANESTHESIA WARD SERVICE

## 2019-04-28 PROCEDURE — 86900 BLOOD TYPING SEROLOGIC ABO: CPT | Performed by: ADVANCED PRACTICE MIDWIFE

## 2019-04-28 PROCEDURE — 0064U ANTB TP TOTAL&RPR IA QUAL: CPT | Performed by: ADVANCED PRACTICE MIDWIFE

## 2019-04-28 PROCEDURE — 25000128 H RX IP 250 OP 636: Performed by: ADVANCED PRACTICE MIDWIFE

## 2019-04-28 PROCEDURE — 85018 HEMOGLOBIN: CPT | Performed by: ADVANCED PRACTICE MIDWIFE

## 2019-04-28 PROCEDURE — 85049 AUTOMATED PLATELET COUNT: CPT | Performed by: ADVANCED PRACTICE MIDWIFE

## 2019-04-28 PROCEDURE — 25800030 ZZH RX IP 258 OP 636: Performed by: ADVANCED PRACTICE MIDWIFE

## 2019-04-28 PROCEDURE — 00HU33Z INSERTION OF INFUSION DEVICE INTO SPINAL CANAL, PERCUTANEOUS APPROACH: ICD-10-PCS | Performed by: ANESTHESIOLOGY

## 2019-04-28 PROCEDURE — 25000125 ZZHC RX 250: Performed by: ANESTHESIOLOGY

## 2019-04-28 RX ORDER — NALOXONE HYDROCHLORIDE 0.4 MG/ML
.1-.4 INJECTION, SOLUTION INTRAMUSCULAR; INTRAVENOUS; SUBCUTANEOUS
Status: DISCONTINUED | OUTPATIENT
Start: 2019-04-28 | End: 2019-04-29

## 2019-04-28 RX ORDER — LIDOCAINE 40 MG/G
CREAM TOPICAL
Status: DISCONTINUED | OUTPATIENT
Start: 2019-04-28 | End: 2019-05-01 | Stop reason: HOSPADM

## 2019-04-28 RX ORDER — OXYCODONE AND ACETAMINOPHEN 5; 325 MG/1; MG/1
1 TABLET ORAL
Status: DISCONTINUED | OUTPATIENT
Start: 2019-04-28 | End: 2019-04-29

## 2019-04-28 RX ORDER — NALBUPHINE HYDROCHLORIDE 10 MG/ML
2.5-5 INJECTION, SOLUTION INTRAMUSCULAR; INTRAVENOUS; SUBCUTANEOUS EVERY 6 HOURS PRN
Status: DISCONTINUED | OUTPATIENT
Start: 2019-04-28 | End: 2019-05-01 | Stop reason: HOSPADM

## 2019-04-28 RX ORDER — NALOXONE HYDROCHLORIDE 0.4 MG/ML
.1-.4 INJECTION, SOLUTION INTRAMUSCULAR; INTRAVENOUS; SUBCUTANEOUS
Status: DISCONTINUED | OUTPATIENT
Start: 2019-04-28 | End: 2019-05-01 | Stop reason: HOSPADM

## 2019-04-28 RX ORDER — EPHEDRINE SULFATE 50 MG/ML
5 INJECTION, SOLUTION INTRAMUSCULAR; INTRAVENOUS; SUBCUTANEOUS
Status: DISCONTINUED | OUTPATIENT
Start: 2019-04-28 | End: 2019-05-01 | Stop reason: HOSPADM

## 2019-04-28 RX ORDER — IBUPROFEN 400 MG/1
800 TABLET, FILM COATED ORAL
Status: DISCONTINUED | OUTPATIENT
Start: 2019-04-28 | End: 2019-04-29

## 2019-04-28 RX ORDER — CARBOPROST TROMETHAMINE 250 UG/ML
250 INJECTION, SOLUTION INTRAMUSCULAR
Status: DISCONTINUED | OUTPATIENT
Start: 2019-04-28 | End: 2019-04-29

## 2019-04-28 RX ORDER — METHYLERGONOVINE MALEATE 0.2 MG/ML
200 INJECTION INTRAVENOUS
Status: DISCONTINUED | OUTPATIENT
Start: 2019-04-28 | End: 2019-04-29

## 2019-04-28 RX ORDER — FENTANYL CITRATE 50 UG/ML
50-100 INJECTION, SOLUTION INTRAMUSCULAR; INTRAVENOUS
Status: DISCONTINUED | OUTPATIENT
Start: 2019-04-28 | End: 2019-04-29

## 2019-04-28 RX ORDER — LIDOCAINE HYDROCHLORIDE AND EPINEPHRINE 15; 5 MG/ML; UG/ML
INJECTION, SOLUTION EPIDURAL
Status: COMPLETED | OUTPATIENT
Start: 2019-04-28 | End: 2019-04-28

## 2019-04-28 RX ORDER — ACETAMINOPHEN 325 MG/1
650 TABLET ORAL EVERY 4 HOURS PRN
Status: DISCONTINUED | OUTPATIENT
Start: 2019-04-28 | End: 2019-04-29

## 2019-04-28 RX ORDER — OXYTOCIN/0.9 % SODIUM CHLORIDE 30/500 ML
1-24 PLASTIC BAG, INJECTION (ML) INTRAVENOUS CONTINUOUS
Status: DISCONTINUED | OUTPATIENT
Start: 2019-04-29 | End: 2019-04-29

## 2019-04-28 RX ORDER — MISOPROSTOL 100 UG/1
25 TABLET ORAL
Status: DISCONTINUED | OUTPATIENT
Start: 2019-04-28 | End: 2019-05-01 | Stop reason: HOSPADM

## 2019-04-28 RX ORDER — TERBUTALINE SULFATE 1 MG/ML
0.25 INJECTION, SOLUTION SUBCUTANEOUS
Status: DISCONTINUED | OUTPATIENT
Start: 2019-04-28 | End: 2019-05-01 | Stop reason: HOSPADM

## 2019-04-28 RX ORDER — LIDOCAINE 40 MG/G
CREAM TOPICAL
Status: DISCONTINUED | OUTPATIENT
Start: 2019-04-28 | End: 2019-04-29

## 2019-04-28 RX ORDER — OXYTOCIN/0.9 % SODIUM CHLORIDE 30/500 ML
100-340 PLASTIC BAG, INJECTION (ML) INTRAVENOUS CONTINUOUS PRN
Status: COMPLETED | OUTPATIENT
Start: 2019-04-28 | End: 2019-04-29

## 2019-04-28 RX ORDER — ONDANSETRON 2 MG/ML
4 INJECTION INTRAMUSCULAR; INTRAVENOUS EVERY 6 HOURS PRN
Status: DISCONTINUED | OUTPATIENT
Start: 2019-04-28 | End: 2019-04-29

## 2019-04-28 RX ORDER — ROPIVACAINE HYDROCHLORIDE 2 MG/ML
10 INJECTION, SOLUTION EPIDURAL; INFILTRATION; PERINEURAL ONCE
Status: COMPLETED | OUTPATIENT
Start: 2019-04-28 | End: 2019-04-28

## 2019-04-28 RX ORDER — ONDANSETRON 2 MG/ML
4 INJECTION INTRAMUSCULAR; INTRAVENOUS EVERY 6 HOURS PRN
Status: DISCONTINUED | OUTPATIENT
Start: 2019-04-28 | End: 2019-04-28

## 2019-04-28 RX ORDER — SODIUM CHLORIDE, SODIUM LACTATE, POTASSIUM CHLORIDE, CALCIUM CHLORIDE 600; 310; 30; 20 MG/100ML; MG/100ML; MG/100ML; MG/100ML
INJECTION, SOLUTION INTRAVENOUS CONTINUOUS
Status: DISCONTINUED | OUTPATIENT
Start: 2019-04-28 | End: 2019-04-29

## 2019-04-28 RX ORDER — OXYTOCIN 10 [USP'U]/ML
10 INJECTION, SOLUTION INTRAMUSCULAR; INTRAVENOUS
Status: DISCONTINUED | OUTPATIENT
Start: 2019-04-28 | End: 2019-04-29

## 2019-04-28 RX ADMIN — SODIUM CHLORIDE, POTASSIUM CHLORIDE, SODIUM LACTATE AND CALCIUM CHLORIDE: 600; 310; 30; 20 INJECTION, SOLUTION INTRAVENOUS at 20:55

## 2019-04-28 RX ADMIN — MISOPROSTOL 25 MCG: 100 TABLET ORAL at 12:17

## 2019-04-28 RX ADMIN — Medication 12 ML/HR: at 23:57

## 2019-04-28 RX ADMIN — MISOPROSTOL 25 MCG: 100 TABLET ORAL at 08:49

## 2019-04-28 RX ADMIN — ONDANSETRON 4 MG: 2 INJECTION INTRAMUSCULAR; INTRAVENOUS at 14:45

## 2019-04-28 RX ADMIN — MISOPROSTOL 25 MCG: 100 TABLET ORAL at 06:45

## 2019-04-28 RX ADMIN — LIDOCAINE HYDROCHLORIDE AND EPINEPHRINE 3 ML: 15; 5 INJECTION, SOLUTION EPIDURAL at 23:32

## 2019-04-28 RX ADMIN — ROPIVACAINE HYDROCHLORIDE 5 ML: 2 INJECTION, SOLUTION EPIDURAL; INFILTRATION at 23:33

## 2019-04-28 RX ADMIN — SODIUM CHLORIDE, POTASSIUM CHLORIDE, SODIUM LACTATE AND CALCIUM CHLORIDE: 600; 310; 30; 20 INJECTION, SOLUTION INTRAVENOUS at 15:16

## 2019-04-28 RX ADMIN — ONDANSETRON 4 MG: 2 INJECTION INTRAMUSCULAR; INTRAVENOUS at 20:49

## 2019-04-28 RX ADMIN — ROPIVACAINE HYDROCHLORIDE 5 ML: 2 INJECTION, SOLUTION EPIDURAL; INFILTRATION at 23:27

## 2019-04-28 ASSESSMENT — COPD QUESTIONNAIRES: COPD: 0

## 2019-04-28 NOTE — PROGRESS NOTES
SARBJIT PROGRESS NOTE    SUBJECTIVE:  Shiraz continues to have nausea with contractions.  She states that she feels like contractions are more intense.  She states that she had a dose of IV zofran for nausea with minimal relief.  Amrkie and parents remain at bedside offering support.     OBJECTIVE:  /85   Temp 98.4  F (36.9  C) (Temporal)   Resp 16   LMP 2018   Breastfeeding? No     Fetal heart tones: Baseline 125   Variability: moderate  Accelerations: present  Decelerations: absent    Contractions: Pt is alexis every 3-5 min minutes, lasting 80-90 seconds and palpates strong    Cervix: 5/ 90% / 0, Vtx  ROM: clear fluid    ASSESSMENT:  IUP @ 39w0d active labor   GBS- negative  ROM x 23 hrs     PLAN:   Discussed still having adequate cervical change  Counseled on pain relief options  MDA came and discussed that it is OK for epidural if she requests  Continue to support/encourage frequent position changes to encourage fetal descent  IA for monitoring  Reviewed signs and symptoms triple I  Pain medication, if requested  Anticipate   reevaluate in 2-3 hours/PRN  Will consider pitocin augmentation PRN    JOAQUIN Sena CNM

## 2019-04-28 NOTE — PROVIDER NOTIFICATION
04/28/19 0930   Provider Notification   Provider Name/Title BERNARDINO Houston CNM   Method of Notification In Department   Request Evaluate - Remote   Notification Reason Status Update   POC per SARBJIT Sellers @ [phone].

## 2019-04-28 NOTE — PLAN OF CARE
"0445-[facility] accompanied by spouse for R/O SROM.   at 39w0d.  Pt states she felt a gush of fluid at 5PM last evening and has continued to feel LOF since.  Small amount of bloody show per pt.  VSS.  Pt denies pain and states she does feel some \"tightening.\"  Prenatal history reviewed.  Pt denies HA, N, blurry vision, or RUQ pain.  Of note, pt does have a water birth agreement signed.    0455- ROM plus sent.    0520-ROM plus positive. Pt alexis every 3-6min.  T's reactive    0525-SVE 1-2/60/-2    0530-Spoke with TINA Santillan CNM, Provider to come in and see pt and enter orders.    0540-Pt moved to rm 215.  Report given to Stephanie TAYLOR RN  "

## 2019-04-28 NOTE — PLAN OF CARE
Report taken from Stephanie TAYLOR RN. All cares assumed. POC per TINA Santillan CNM is 2 doses of PO cytotec. BERNARDINO Houston CNM taking over provider duties as of this writing.

## 2019-04-28 NOTE — PROGRESS NOTES
CNM PROGRESS NOTE    SUBJECTIVE:  Shiraz is sitting up in bed, rocking side to side.  Markie and parents at the bedside providing support.      Shiraz states that she feels like the contractions have gotten stronger since her last dose of PO misoprostol.  She is also having nausea with each contraction.  She states that she is able to breathe and move during contractions.    OBJECTIVE:  /85   Temp 98.2  F (36.8  C) (Oral)   Resp 16   LMP 2018   Breastfeeding? No     Fetal heart tones: Baseline 125   Variability: moderate  Accelerations: present  Decelerations: absent    Contractions: Pt is alexis every 3-5 minutes, lasting 60-80 seconds and palpates moderate to strong    Cervix: 4/ 80%/ -1, Vtx; sutures palpated with check  ROM: clear fluid    Pitocin- none  Antibiotics- none  Cervical ripening: misoprostol x 3 doses    ASSESSMENT:  IUP @ 39w0d early labor   Gallegos score: 10  GBS- negative  ROM x 19 hrs     PLAN:   Counseled on labor progress, will continue to monitor progress  Stop ripening agents at this time  Briefly discussed pain relief options  Anticipate   reevaluate in 2-3 hours/PRN  Will consider pitocin augmentation if needed  My use IA for fetal monitoring if Shiraz desires to get out of bed, ambulate, or get in tub/shower  Encouraged positioning for fetal descent    JOAQUIN Sena CNM

## 2019-04-28 NOTE — PLAN OF CARE
1630 pt requesting hydrotherapy in tub, tub filled.  IA q 30 minutes per protocol for monitoring.    1750 pt out of tub

## 2019-04-28 NOTE — PLAN OF CARE
Pt verbalizes desire to cope with labor pain with comfort measures.  Birthing ball, position changes, hydrotherapy explained and encouraged.

## 2019-04-28 NOTE — PLAN OF CARE
Data: Patient admitted to room 215 at 0540. Patient is a . Prenatal record reviewed.   OB History    Para Term  AB Living   1 0 0 0 0 0   SAB TAB Ectopic Multiple Live Births   0 0 0 0 0      # Outcome Date GA Lbr Olivier/2nd Weight Sex Delivery Anes PTL Lv   1 Current            .  Medical History:   Past Medical History:   Diagnosis Date     Crohn's disease (H)      Spina bifida occulta 2009     Spondylisthesis 2009     Tremor 2017     Ulcer of jejunum 10/2008   .  Gestational age 39w0d. Vital signs per doc flowsheet. Fetal movement present. Patient reports Rule out rupture of membranes   as reason for admission. Support persons Markie present.  Action: Report from Regine BEGUM RN obtained at 0540. Care of patient assumed at 0540. Verbal consent for EFM, external fetal monitors applied. Admission assessment completed. Patient and support persons educated on labor process. Patient instructed to report change in fetal movement, contractions, vaginal leaking of fluid or bleeding, abdominal pain, or any concerns related to the pregnancy to her nurse/physician. Patient oriented to room, call light in reach.   Response: Kyung Santillan informed of admission. Plan per provider is PO Cytotec x2 doses, recheck patient and call for further instruction. Patient verbalized understanding of education and verbalized agreement with plan. Patient coping with labor, comfortable.

## 2019-04-28 NOTE — TELEPHONE ENCOUNTER
"Called Shiraz back at 2140 to check on her.  She states that she was able to take a nap, and she just took a shower.  Shiraz reports that she's having \"mild\" contractions that are very irregular. Clear fluid continues to leak. Shiraz has noted some pink spotting and a couple of small, red clots but denies any ale red bleeding. Baby has been active.    Shiraz would like to stay home to see if contractions will become stronger and more frequent at this time. Discussed that Shiraz is welcome to present to the hospital for evaluation or call back at any time; also recommended that she be evaluated in MAC between 0200 and 0500 at the very latest. Shiraz agrees; she will call back when she's headed into the hospital overnight or sooner with questions/concerns.    Kyung Lal, DNP, APRN, CNM    "

## 2019-04-28 NOTE — PLAN OF CARE
Pt to room @ 0540. Denies pain. Feels some tightening to abdomen intermittently. Plan is for two doses of PO Cytotec and recheck to see progress. IV in place, SL.  Markie at bedside, supportive. Plan of care reviewed with patient and spouse. FHT WNL. Continue to monitor.

## 2019-04-28 NOTE — PLAN OF CARE
0800 spoke with [facility] regarding early consult d/t spina bifida occult. Stated he was starting a case and would round when he was able.

## 2019-04-29 LAB
BLOOD BANK CMNT PATIENT-IMP: NORMAL
BLOOD BANK CMNT PATIENT-IMP: NORMAL
HGB BLD-MCNC: 9.3 G/DL (ref 11.7–15.7)
T PALLIDUM AB SER QL: NONREACTIVE

## 2019-04-29 PROCEDURE — 25000132 ZZH RX MED GY IP 250 OP 250 PS 637: Performed by: ADVANCED PRACTICE MIDWIFE

## 2019-04-29 PROCEDURE — 3E0P7VZ INTRODUCTION OF HORMONE INTO FEMALE REPRODUCTIVE, VIA NATURAL OR ARTIFICIAL OPENING: ICD-10-PCS | Performed by: OBSTETRICS & GYNECOLOGY

## 2019-04-29 PROCEDURE — 88307 TISSUE EXAM BY PATHOLOGIST: CPT | Performed by: NURSE PRACTITIONER

## 2019-04-29 PROCEDURE — 25000128 H RX IP 250 OP 636: Performed by: ADVANCED PRACTICE MIDWIFE

## 2019-04-29 PROCEDURE — 10D17Z9 MANUAL EXTRACTION OF PRODUCTS OF CONCEPTION, RETAINED, VIA NATURAL OR ARTIFICIAL OPENING: ICD-10-PCS | Performed by: OBSTETRICS & GYNECOLOGY

## 2019-04-29 PROCEDURE — 25000125 ZZHC RX 250: Performed by: ADVANCED PRACTICE MIDWIFE

## 2019-04-29 PROCEDURE — 72200001 ZZH LABOR CARE VAGINAL DELIVERY SINGLE

## 2019-04-29 PROCEDURE — 59414 DELIVER PLACENTA: CPT | Performed by: OBSTETRICS & GYNECOLOGY

## 2019-04-29 PROCEDURE — 0UQMXZZ REPAIR VULVA, EXTERNAL APPROACH: ICD-10-PCS | Performed by: OBSTETRICS & GYNECOLOGY

## 2019-04-29 PROCEDURE — 25000125 ZZHC RX 250: Performed by: NURSE PRACTITIONER

## 2019-04-29 PROCEDURE — 36415 COLL VENOUS BLD VENIPUNCTURE: CPT | Performed by: NURSE PRACTITIONER

## 2019-04-29 PROCEDURE — 85018 HEMOGLOBIN: CPT | Performed by: NURSE PRACTITIONER

## 2019-04-29 PROCEDURE — 59410 OBSTETRICAL CARE: CPT | Performed by: NURSE PRACTITIONER

## 2019-04-29 PROCEDURE — 0KQM0ZZ REPAIR PERINEUM MUSCLE, OPEN APPROACH: ICD-10-PCS | Performed by: OBSTETRICS & GYNECOLOGY

## 2019-04-29 PROCEDURE — 12000035 ZZH R&B POSTPARTUM

## 2019-04-29 PROCEDURE — 25800030 ZZH RX IP 258 OP 636: Performed by: ADVANCED PRACTICE MIDWIFE

## 2019-04-29 RX ORDER — BISACODYL 10 MG
10 SUPPOSITORY, RECTAL RECTAL DAILY PRN
Status: DISCONTINUED | OUTPATIENT
Start: 2019-05-01 | End: 2019-05-01 | Stop reason: HOSPADM

## 2019-04-29 RX ORDER — OXYTOCIN/0.9 % SODIUM CHLORIDE 30/500 ML
100 PLASTIC BAG, INJECTION (ML) INTRAVENOUS CONTINUOUS
Status: DISCONTINUED | OUTPATIENT
Start: 2019-04-29 | End: 2019-05-01 | Stop reason: HOSPADM

## 2019-04-29 RX ORDER — DOCUSATE SODIUM 100 MG/1
100 CAPSULE, LIQUID FILLED ORAL 2 TIMES DAILY
Status: DISCONTINUED | OUTPATIENT
Start: 2019-04-29 | End: 2019-05-01 | Stop reason: HOSPADM

## 2019-04-29 RX ORDER — CALCIUM CARBONATE 500 MG/1
1000 TABLET, CHEWABLE ORAL EVERY 4 HOURS PRN
Status: DISCONTINUED | OUTPATIENT
Start: 2019-04-29 | End: 2019-05-01 | Stop reason: HOSPADM

## 2019-04-29 RX ORDER — NALOXONE HYDROCHLORIDE 0.4 MG/ML
.1-.4 INJECTION, SOLUTION INTRAMUSCULAR; INTRAVENOUS; SUBCUTANEOUS
Status: DISCONTINUED | OUTPATIENT
Start: 2019-04-29 | End: 2019-05-01 | Stop reason: HOSPADM

## 2019-04-29 RX ORDER — OXYTOCIN/0.9 % SODIUM CHLORIDE 30/500 ML
340 PLASTIC BAG, INJECTION (ML) INTRAVENOUS CONTINUOUS PRN
Status: DISCONTINUED | OUTPATIENT
Start: 2019-04-29 | End: 2019-05-01 | Stop reason: HOSPADM

## 2019-04-29 RX ORDER — OXYTOCIN 10 [USP'U]/ML
10 INJECTION, SOLUTION INTRAMUSCULAR; INTRAVENOUS
Status: DISCONTINUED | OUTPATIENT
Start: 2019-04-29 | End: 2019-05-01 | Stop reason: HOSPADM

## 2019-04-29 RX ORDER — HYDROCORTISONE 2.5 %
CREAM (GRAM) TOPICAL 3 TIMES DAILY PRN
Status: DISCONTINUED | OUTPATIENT
Start: 2019-04-29 | End: 2019-05-01 | Stop reason: HOSPADM

## 2019-04-29 RX ORDER — IBUPROFEN 400 MG/1
800 TABLET, FILM COATED ORAL EVERY 6 HOURS PRN
Status: DISCONTINUED | OUTPATIENT
Start: 2019-04-29 | End: 2019-05-01 | Stop reason: HOSPADM

## 2019-04-29 RX ORDER — ACETAMINOPHEN 325 MG/1
650 TABLET ORAL EVERY 4 HOURS PRN
Status: DISCONTINUED | OUTPATIENT
Start: 2019-04-29 | End: 2019-05-01 | Stop reason: HOSPADM

## 2019-04-29 RX ORDER — LANOLIN 100 %
OINTMENT (GRAM) TOPICAL
Status: DISCONTINUED | OUTPATIENT
Start: 2019-04-29 | End: 2019-05-01 | Stop reason: HOSPADM

## 2019-04-29 RX ADMIN — OXYTOCIN-SODIUM CHLORIDE 0.9% IV SOLN 30 UNIT/500ML 2 MILLI-UNITS/MIN: 30-0.9/5 SOLUTION at 00:00

## 2019-04-29 RX ADMIN — ACETAMINOPHEN 650 MG: 325 TABLET, FILM COATED ORAL at 10:07

## 2019-04-29 RX ADMIN — IBUPROFEN 800 MG: 400 TABLET ORAL at 10:07

## 2019-04-29 RX ADMIN — ONDANSETRON 4 MG: 2 INJECTION INTRAMUSCULAR; INTRAVENOUS at 07:38

## 2019-04-29 RX ADMIN — ACETAMINOPHEN 650 MG: 325 TABLET, FILM COATED ORAL at 14:04

## 2019-04-29 RX ADMIN — IBUPROFEN 800 MG: 400 TABLET ORAL at 21:52

## 2019-04-29 RX ADMIN — OXYTOCIN-SODIUM CHLORIDE 0.9% IV SOLN 30 UNIT/500ML 340 ML/HR: 30-0.9/5 SOLUTION at 08:47

## 2019-04-29 RX ADMIN — DOCUSATE SODIUM 100 MG: 100 CAPSULE, LIQUID FILLED ORAL at 20:21

## 2019-04-29 RX ADMIN — CALCIUM CARBONATE (ANTACID) CHEW TAB 500 MG 1000 MG: 500 CHEW TAB at 02:40

## 2019-04-29 RX ADMIN — MISOPROSTOL 800 MCG: 100 TABLET ORAL at 09:05

## 2019-04-29 RX ADMIN — IBUPROFEN 800 MG: 400 TABLET ORAL at 15:59

## 2019-04-29 RX ADMIN — ACETAMINOPHEN 650 MG: 325 TABLET, FILM COATED ORAL at 21:52

## 2019-04-29 RX ADMIN — SODIUM CHLORIDE, POTASSIUM CHLORIDE, SODIUM LACTATE AND CALCIUM CHLORIDE: 600; 310; 30; 20 INJECTION, SOLUTION INTRAVENOUS at 02:07

## 2019-04-29 RX ADMIN — ACETAMINOPHEN 650 MG: 325 TABLET, FILM COATED ORAL at 18:05

## 2019-04-29 RX ADMIN — OXYTOCIN-SODIUM CHLORIDE 0.9% IV SOLN 30 UNIT/500ML 10 MILLI-UNITS/MIN: 30-0.9/5 SOLUTION at 07:41

## 2019-04-29 NOTE — ANESTHESIA PROCEDURE NOTES
Peripheral nerve/Neuraxial procedure note : epidural catheter  Pre-Procedure  Performed by Stephanie Kaufman  Location: OB      Pre-Anesthestic Checklist: patient identified, IV checked, risks and benefits discussed, informed consent and pre-op evaluation    Timeout  Correct Patient: Yes   Correct Procedure: Yes   Correct Site: Yes   Correct Laterality: N/A   Correct Position: Yes   Site Marked: N/A   .   Procedure Documentation    .    Procedure:    Epidural catheter.  Insertion Site:L3-4  (midline approach) Injection technique: LORT saline   Local skin infiltrated with mL of 1% lidocaine.  LESLEY at 5 cm     Patient Prep;mask, sterile gloves, povidone-iodine 7.5% surgical scrub, patient draped.  .  Needle: Touhy needle Needle Gauge: 17.    Needle Length (Inches) 3.5  # of attempts: 1 and # of redirects:  .   . .  Catheter threaded easily  5 cm epidural space.  .   .    Assessment/Narrative  Paresthesias: No.  .  .  Aspiration negative for heme or CSF  . Test dose of 3 mL lidocaine 1.5% w/ 1:200,000 epinephrine at. Test dose negative for signs of intravascular, subdural or intrathecal injection.   Medications Administered  Lidocaine 1.5%-EPINEPHrine 1:200,000 injection, 3 mL

## 2019-04-29 NOTE — PLAN OF CARE
After laboring down for an hour after being complete, attempted to start pushing with patient. Corazon Houston at bedside for pushing efforts and 1:1 RN to continuously monitor FHR. Pt contractions spaced out after attempting to push. Per BERNARDINO Houston, pit was stopped and patient was repositioned from side to side in attempt to have contractions start up. Pit was then restarted at half the rate. Report given to Jelena VÁSQUEZ RN at bedside.

## 2019-04-29 NOTE — L&D DELIVERY NOTE
Shiraz labored down for 1 hour then began pushing at approximately 0545 for 30-45 minutes.  During active pushing, contractions spaced out to every 7-10 minutes and patient was not able to coordinate pushing efforts at that time. After pushing SVE was 10/100/+1 and showed fetal position as OP.  Due to uncoordinated pushing efforts, longer periods of time between contractions, decision was make to discontinue pitocin for 30 minutes and plan to restart at 1/2 dose (8mu) after 30 minutes.     While pitocin was off, Shiraz was assisted into side lying release for 10-15 minutes each side.  During side lying release, she continued to have 1-2 contractions in each position.  After side lying release, patient was assisted into lithotomy position.  SVE at that time, 10/100/+2.  Pitocin was restarted and patient began pushing with verbal guidance, had good maternal effort and good movement of baby vertex.       Delivery Note    IUP at 39w1d gestation delivered on 2019.     delivery of a viable Female infant.  Apgars of 9 at 1 minute and 9 at 5 minutes.  NNP present for delivery? No.  Labor was induced.    Medications administered  in labor:  Pain Rx: Epidural. Antibiotics: No. .  Perineum: 2nd degree, Periurethral laceration and Labial laceration. Repaired: Yes.   Delayed cord clamping: Yes until pulsation seized  Placenta-mechanism: by manual extraction intact,  with a 3 vessel cord. IV oxytocin was given. Rectal misoprostol was given.  Manual removal of placenta done by Dr. Barragan  Quantitative Blood Loss:  1411 cc's  Complications of pregnancy, labor and delivery: Hemorrhage > 500 cc, Retained Placenta, ROM without labor and ROM > 18 hours.    Corazon Houston, JOAQUIN [facility]    Delivery Summary    Shiraz Boykin MRN# [id number]   Age: 33 year old YOB: 1985     ASSESSMENT & PLAN:   34yo femal  s/p  viable infant girl  Retained placenta requiring manual removal by Dr. Barragan  PPH 1411 ml  Lactating  "mother    Plan:  Routine PP cares  Support/encourage breastfeeding  Monitor for signs and symptoms anemia  Hemoglobin this PM  Plan discharge 19    Corazon NUÑEZ CNM       Labor Event Times    Labor onset date:  19 Onset time:   3:30 AM   Dilation complete date:  19 Complete time:   4:25 AM   Start pushing date/time:  2019 0740      Labor Events     labor?:  No  Labor Type:  Spontaneous, Induction  Predominate monitoring during 1st stage:  continuous electronic fetal monitoring     Rupture identifier:  Sac 1  Rupture date/time: 19 1700   Rupture type:  Spontaneous rupture of membranes prior to induction  Fluid color:  Clear  Fluid odor:  Normal     Induction:  Oxytocin, Misoprostol  Induction date/time:     Cervical ripening date/time:     Indications for induction:  Prolonged ROM     Delivery/Placenta Date and Time    Delivery Date:  19 Delivery Time:   8:16 AM   Placenta Date/Time:  2019  9:04 AM  Oxytocin given at the time of delivery:  after delivery of baby     Vaginal Counts     Initial count performed by 2 team members:   Two Team Members   kevin GURROLA CNM       Needles Suture Henderson Sponges Instruments   Initial counts 2 2 15    Added to count       Final counts 2 2 15    Placed during labor Accounted for at the end of labor   NA    NA    NA     Final count performed by 2 team members:   Two Team Members   kevin BLACK CNM      Final count correct?:  Yes     Apgars    Living status:  Living   1 Minute 5 Minute 10 Minute 15 Minute 20 Minute   Skin color: 1  1       Heart rate: 2  2       Reflex irritability: 2  2       Muscle tone: 2  2       Respiratory effort: 2  2       Total: 9  9       Apgars assigned by:  LEO NICK RN     Cord    Vessels:  3 Vessels    Cord Blood Disposition:  Lab Gases Sent?:  No       Resuscitation    Methods:  None      Measurements    Weight:  6 lb 14.4 oz Length:  1' 7.5\"   Head circumference:  " 33 cm       Skin to Skin and Feeding Plan    Skin to skin initiation date/time: 1/4/1841    Skin to skin end date/time:     Breastfeeding initiated date/time:  4/29/2019 0835  How do you plan to feed your baby:  Breastfeeding     Labor Events and Shoulder Dystocia    Fetal Tracing Prior to Delivery:  Category 2  Fetal Tracing Comments:  intermittent decels with pushing  Shoulder dystocia present?:  Neg     Delivery (Maternal) (Provider to Complete) ([id number])    Episiotomy:  None  Perineal lacerations:  2nd Repaired?:  Yes   Periurethral laceration:  right Repaired?:  Yes   Labial laceration:  right Repaired?:  Yes   Vaginal laceration?:  No    Cervical laceration?:  No       Blood Loss  Mother: Shiraz Boykin #[id number]   Start of Mother's Information    IO Blood Loss  04/28/19 0330 - 04/29/19 1017    Total QBL Blood Loss (mL) Hospital Encounter 1411 mL    Total  1411 mL         End of Mother's Information  Mother: Shiraz Boykin #[id number]         Delivery - Provider to Complete ([id number])    Delivering clinician:  Corazon Houston APRN CNM  Attempted Delivery Types (Choose all that apply):  Spontaneous Vaginal Delivery  Delivery Type (Choose the 1 that will go to the Birth History):  Vaginal, Spontaneous   Other personnel:   Provider Role   Jhonny Sheridan APRN CNM Certified Nurse Midwife         Placenta    Delayed Cord Clamping:  Done  Date/Time:  4/29/2019  9:04 AM  Removal:  Manual Removal  Comments:  Cremer preformed removal  Disposition:  Hospital disposal     Anesthesia    Method:  Epidural, Nitrous Oxide  Cervical dilation at placement:  4-7          Presentation and Position    Presentation:  Vertex  Position:  Right Occiput Anterior           JOAQUIN Sena CNM

## 2019-04-29 NOTE — CONSULTS
OB consult and procedure:    Called by midwife service for retained placenta  Has been 45 minute since delivery     39w 1d   First baby  Epidural functioning well    Procedure: placenta partially attached, anterior  Easily removed by manual exploration  Placenta appears intact  EBL 1300 ml   uterine fundus very firm but ballooned out lower segment    Given 800 mg of rectal cytotec    No vaginal sidewall lacerations palpated.  Bleeding well controlled at time of my departure

## 2019-04-29 NOTE — LACTATION NOTE
Initial visit with ZULEIMA Weldon and baby.    Breastfeeding general information reviewed.   Advised to breastfeed exclusively, on demand, avoid pacifiers, bottles and formula unless medically indicated.  Encouraged rooming in, skin to skin, feeding on demand 8-12x/day or sooner if baby cues.  Explained benefits of holding and skin to skin.  Encouraged lots of skin to skin. Instructed on hand expression.  Hand express gtts. Continues to nurse well per mom. No further questions at this time.   Will follow as needed. Baby able to latch on well to the right breast lips flanged and multiple swallows heard.    nAgelica Hernandez BSN, RN, PHN, RNC-MNN, IBCLC

## 2019-04-29 NOTE — PROGRESS NOTES
MERRYM PROGRESS NOTE    SUBJECTIVE:  Shiraz has been resting well with her epidural.  She states that she has been able to get some sleep and feels more rested.  Markie and parent are at bedside.     OBJECTIVE:  BP 98/62   Temp 98.1  F (36.7  C) (Temporal)   Resp 16   LMP 2018   SpO2 99%   Breastfeeding? No     Fetal heart tones: Baseline 120   Variability: moderate  Accelerations: present  Decelerations: absent    Contractions: Pt is alexis every 4-6 minutes, lasting 130 seconds and palpates strong    Cervix: 10/ 100% / +1, Vtx  ROM: clear fluid    Pitocin- 12 mu/min.  Antibiotics- none    ASSESSMENT:  IUP @ 39w1d active labor and second stage labor   GBS- negative  ROM x 35 hours     PLAN:   Discussed options for laboring down for 1 hour vs begin active pushing.  Discussed R/B/SE.  Patient desires to labor down for 1 hour in anticipation to feel more pelvic/rectal pressure prior to pushing  Anticipate   Labor augmentation with Pitocin per protocol  Position on right side with peanut ball in place  Re-evaluate in 1 hour/PRN    JOAQUIN Sena CNM

## 2019-04-29 NOTE — PLAN OF CARE
Pt comfortable after epidural. Pitocin @ 12. Complete @ 0455, laboring down until 0530 and will start pushing. FHR WNL. Spouse and patients parents at bedside, supportive. Patient remains afebrile, VSS. Continue to monitor.

## 2019-04-29 NOTE — PROVIDER NOTIFICATION
04/29/19 0425   Provider Notification   Provider Name/Title BERNARDINO Houston CNM   Method of Notification At Bedside   Request Evaluate in Person   Notification Reason SVE;Status Update  (Pt complete)      Pt complete; Will labor down for one hour. Pt does not feel pressure or urge to push.

## 2019-04-29 NOTE — PROGRESS NOTES
MERRYM PROGRESS NOTE    SUBJECTIVE:  Shiraz asked for an epidural at 2230.  Now is lying in bed with epidural infusing; states that she has great relief from epidural.  Markie is at bedside.    OBJECTIVE:  /81   Temp 99.3  F (37.4  C) (Temporal)   Resp 18   LMP 2018   Breastfeeding? No     Fetal heart tones: Baseline 120   Variability: moderate  Accelerations: present  Decelerations: absent    Contractions: Pt is alexis every 5-7 minutes, lasting  seconds and palpates moderate to strong    Cervix: 7.5/ 90% / 0, Vtx  ROM: clear fluid    Pitocin- none  Antibiotics- none    ASSESSMENT:  IUP @ 39w0d active labor and minimal/no progress   GBS- negative  ROM x 30 hrs     PLAN:   Due to minimal cervical change in 4 hours, discussed option for pitocin augmentation.  Discussed R/B/SE.  Patient desires to start pitocin augmentation at this time  Frequent position changes with peanut ball to facilitate fetal descent  Anticipate   Labor augmentation with Pitocin; will titrate per protocol  reevaluate in 2-4 hours/PRN    JOAQUIN Sena CNM

## 2019-04-29 NOTE — PROVIDER NOTIFICATION
04/29/19 0155   Provider Notification   Provider Name/Title BERNARDINO Houston CNM   Method of Notification In Department   Request Evaluate in Person   Notification Reason SVE;Status Update    Updated on SVE: 8-9/90/0. Pt comfortable. Afebrile. Knott placed. Repositioning. Pitocin being titrated per protocol.

## 2019-04-29 NOTE — PLAN OF CARE
Epidural placed without complications. Patient comfortable after procedure. RN at bedside to monitor BP and patient status. See MDA note for further documentation.

## 2019-04-29 NOTE — ANESTHESIA PREPROCEDURE EVALUATION
"Anesthesia Pre-Procedure Evaluation    Patient: Shiraz Boykin   MRN: [id number] : 1985          Preoperative Diagnosis: * No surgery found *        Past Medical History:   Diagnosis Date     Crohn's disease (H)      Spina bifida occulta 2009     Spondylisthesis 2009     Tremor 2017     Ulcer of jejunum 10/2008     Past Surgical History:   Procedure Laterality Date     APPENDECTOMY  1999     COLONOSCOPY  2008     ENT SURGERY      Created eardrum opening      GI SURGERY  2008    Esophagogastroduodenoscopy     HEAD & NECK SURGERY      T&A       Anesthesia Evaluation     .             ROS/MED HX    ENT/Pulmonary:      (-) asthma, COPD and sleep apnea   Neurologic: Comment: Spina bifida occulta       Cardiovascular:         METS/Exercise Tolerance:     Hematologic:         Musculoskeletal:         GI/Hepatic:        (-) GERD   Renal/Genitourinary:         Endo:         Psychiatric:         Infectious Disease:         Malignancy:         Other:                                 Lab Results   Component Value Date    WBC 9.8 2019    HGB 11.9 2019    HCT 33.2 (L) 2019     2019       Preop Vitals  BP Readings from Last 3 Encounters:   19 119/81   19 116/76   19 100/72    Pulse Readings from Last 3 Encounters:   No data found for Pulse      Resp Readings from Last 3 Encounters:   19 18    SpO2 Readings from Last 3 Encounters:   No data found for SpO2      Temp Readings from Last 1 Encounters:   19 37.4  C (99.3  F) (Temporal)    Ht Readings from Last 1 Encounters:   19 1.626 m (5' 4\")      Wt Readings from Last 1 Encounters:   19 68 kg (150 lb)    Estimated body mass index is 25.75 kg/m  as calculated from the following:    Height as of 19: 1.626 m (5' 4\").    Weight as of 19: 68 kg (150 lb).       Anesthesia Plan      History & Physical Review  History and physical reviewed and following examination; no interval " change.    ASA Status:  2 .    NPO Status:  > 8 hours    Plan for Epidural     Long discussion regarding risks benefits and alternatives in the setting of spina biffida ocuolta.  Patient would like to proceed with the epidural       Postoperative Care      Consents                 Stephanie Kaufman

## 2019-04-29 NOTE — PLAN OF CARE
Data: Shiraz Boykin transferred to 425 via wheelchair at 1130. Baby transferred via parent's arms.  Action: Receiving unit notified of transfer: Yes. Patient and family notified of room change. Report given to Bethanie BARR at 1130. Belongings sent to receiving unit. Accompanied by Registered Nurse. Oriented patient to surroundings. Call light within reach. ID bands double-checked with receiving RN.  Response: Patient tolerated transfer and is stable.

## 2019-04-29 NOTE — PROGRESS NOTES
MERRYM PROGRESS NOTE    SUBJECTIVE:  Shiraz is in bed with Markie and parents at bedside.  She states that she feels like contractions are getting more intense.  She continues to have nausea with each contraction and is having vomiting intermittently.    OBJECTIVE:  /85   Temp 98.3  F (36.8  C) (Temporal)   Resp 16   LMP 2018   Breastfeeding? No     Fetal heart tones: Baseline 120   Variability: moderate   Accelerations: absent  Decelerations: absent    Contractions: Pt is alexis every 3-6 minutes, lasting  seconds and palpates strong    Cervix: 7/ 90% / 0, Vtx  ROM: clear fluid    Pitocin- none    ASSESSMENT:  IUP @ 39w0d active labor   GBS- negative  ROM x 26 hrs     PLAN:   Continue to support position changes to encourage fetal descent, may get back in tub for hydrotherapy if she desires  Pain medication if requested  Anticipate   Labor augmentation with Pitocin if needed  reevaluate in 2-3 hours/PRN    JOAQUIN Sena CNM

## 2019-04-29 NOTE — PLAN OF CARE
Arrived to unit with infant around 1130. Infant safety education given to patient and spouse. Pain well managed with PRN Tylenol. Denies nausea. Working on breastfeeding infant. Tolerating regular diet. Voiding spontaneously. Up with assist of 1 to bathroom.

## 2019-04-30 LAB
COPATH REPORT: NORMAL
HGB BLD-MCNC: 8.8 G/DL (ref 11.7–15.7)

## 2019-04-30 PROCEDURE — 85018 HEMOGLOBIN: CPT | Performed by: ADVANCED PRACTICE MIDWIFE

## 2019-04-30 PROCEDURE — 12000035 ZZH R&B POSTPARTUM

## 2019-04-30 PROCEDURE — 36415 COLL VENOUS BLD VENIPUNCTURE: CPT | Performed by: ADVANCED PRACTICE MIDWIFE

## 2019-04-30 PROCEDURE — 25000132 ZZH RX MED GY IP 250 OP 250 PS 637: Performed by: ADVANCED PRACTICE MIDWIFE

## 2019-04-30 RX ORDER — MULTIVIT WITH MINERALS/LUTEIN
250 TABLET ORAL DAILY
Status: DISCONTINUED | OUTPATIENT
Start: 2019-04-30 | End: 2019-05-01 | Stop reason: HOSPADM

## 2019-04-30 RX ORDER — FERROUS SULFATE 325(65) MG
325 TABLET ORAL DAILY
Status: DISCONTINUED | OUTPATIENT
Start: 2019-04-30 | End: 2019-05-01 | Stop reason: HOSPADM

## 2019-04-30 RX ORDER — MAGNESIUM 200 MG
1000 TABLET ORAL DAILY
Status: DISCONTINUED | OUTPATIENT
Start: 2019-04-30 | End: 2019-05-01

## 2019-04-30 RX ORDER — ASCORBIC ACID 250 MG
250 TABLET,CHEWABLE ORAL DAILY
Status: DISCONTINUED | OUTPATIENT
Start: 2019-04-30 | End: 2019-04-30 | Stop reason: ALTCHOICE

## 2019-04-30 RX ADMIN — ACETAMINOPHEN 650 MG: 325 TABLET, FILM COATED ORAL at 11:05

## 2019-04-30 RX ADMIN — IBUPROFEN 800 MG: 400 TABLET ORAL at 03:50

## 2019-04-30 RX ADMIN — FERROUS SULFATE TAB 325 MG (65 MG ELEMENTAL FE) 325 MG: 325 (65 FE) TAB at 11:05

## 2019-04-30 RX ADMIN — ASCORBIC ACID TAB 250 MG 250 MG: 250 TAB at 13:13

## 2019-04-30 RX ADMIN — ACETAMINOPHEN 650 MG: 325 TABLET, FILM COATED ORAL at 02:04

## 2019-04-30 RX ADMIN — ACETAMINOPHEN 650 MG: 325 TABLET, FILM COATED ORAL at 16:56

## 2019-04-30 RX ADMIN — DOCUSATE SODIUM 100 MG: 100 CAPSULE, LIQUID FILLED ORAL at 20:33

## 2019-04-30 RX ADMIN — ACETAMINOPHEN 650 MG: 325 TABLET, FILM COATED ORAL at 06:00

## 2019-04-30 RX ADMIN — IBUPROFEN 800 MG: 400 TABLET ORAL at 16:56

## 2019-04-30 RX ADMIN — DOCUSATE SODIUM 100 MG: 100 CAPSULE, LIQUID FILLED ORAL at 08:38

## 2019-04-30 RX ADMIN — IBUPROFEN 800 MG: 400 TABLET ORAL at 11:05

## 2019-04-30 NOTE — PLAN OF CARE
Fundus firm and bleeding wnl- using ice and tucks.  VSS.  Voiding without difficulty.  Rates pain 3/10 and taking tylenol and ibuprofen when due with good relief.  Using lanolin.  Hemmroids present and using tucks.  IV SL and AM hemoglobin.  Denies dizziness.  Encouraged to call with questions or concerns.  Will continue to monitor.

## 2019-04-30 NOTE — ANESTHESIA POSTPROCEDURE EVALUATION
Patient: Shiraz Boykin    * No procedures listed *    Diagnosis:* No pre-op diagnosis entered *  Diagnosis Additional Information: No value filed.    Anesthesia Type:  Labor Epidural     Note:  Anesthesia Post Evaluation    Patient location during evaluation: Floor and Bedside (Postpartum Unit)  Patient participation: Able to fully participate in evaluation  Level of consciousness: awake and alert  Pain management: adequate  Airway patency: patent  Cardiovascular status: acceptable and hemodynamically stable  Respiratory status: acceptable, nonlabored ventilation and unassisted  Hydration status: acceptable  PONV: none       Comments: Pt denies epidural-related complaints.         Last vitals:  Vitals:    04/29/19 1050 04/29/19 1127 04/29/19 1532   BP: 115/74 114/77 110/71   Resp: 16 16 16   Temp:  37  C (98.6  F) 36.8  C (98.3  F)   SpO2: 90%           Electronically Signed By: Harley Rasmussen MD  April 29, 2019  7:20 PM

## 2019-04-30 NOTE — PLAN OF CARE
Vitals stable. Up ad layo well. Voiding without difficulty. Pain controlled with tylenol and ibuprofen. Hgb 8.8 this morning. Midwife aware. Started on PO iron. Breastfeeding going well. Saline lock removed. Encouraged to fill out depression screen.

## 2019-04-30 NOTE — PROGRESS NOTES
"[facility] Progress Note: Postpartum Day #1    2019  9:49 AM    SUBJECTIVE:  Patient is stable and is tolerating acitivity well. Shiraz denies any signs or symptoms of anemia.  Baby is rooming in  Complications since 2 hours post delivery: None  Pain is well controlled.  Patient is taking pain medications, Tylenol and Ibuprofen.  Breastfeeding status: initiated, baby girl is a \"champ\", per Shiraz.   Elimination:  She is voiding without difficulty.  She has had a bowel movement.  Desired contraception: Unsure  Denies heavy bleeding and passing large clots.  Feels good about birth experience; it was a long labor but Shiraz is glad baby is here.    OBJECTIVE:  /77 (BP Location: Right arm)   Pulse 79   Temp 98  F (36.7  C) (Oral)   Resp 16   LMP 2018   SpO2 90%   Breastfeeding? Unknown     Constitutional: healthy, alert, no distress and smiling    Breasts: Currently breastfeeding    Fundus: Uterine fundus is firm, non-tender and at 1cm below the level of the umbilicus    Perineum: Perineum is well approximated, minimal swelling    Lochia: Lochia is appropriate for the duration of time since delivery.     ASSESSMENT:  PPD #1    Manual extraction of placenta  Doing well.  Normal healing wound.  No excessive bleeding  Pain well-controlled.  Vital signs stable    Hemoglobin   Date Value Ref Range Status   2019 8.8 (L) 11.7 - 15.7 g/dL Final       PLAN:  Continue routine care  Oral iron supplementation; iron, vitamin c and vitamin b12 ordered.  Plan to discharge with supplementation. Continue stool softeners.  RhoGam prior to discharge if indicated; to be determined by blood bank  OK to remove saline lock today  Lactation consultation  Reviewed breastfeeding  Reviewed postpartum warning signs  Plans unsure for contraception postpartum; reviewed options.  Plan for 2 week Hgb check in clinic, sooner if needed.  Anticipated discharge tomorrow, 19        Kyung Lal, " DNP, APRN, CNM

## 2019-05-01 VITALS
DIASTOLIC BLOOD PRESSURE: 83 MMHG | SYSTOLIC BLOOD PRESSURE: 123 MMHG | RESPIRATION RATE: 16 BRPM | OXYGEN SATURATION: 90 % | HEART RATE: 85 BPM | TEMPERATURE: 98.8 F

## 2019-05-01 PROBLEM — D62 ANEMIA DUE TO BLOOD LOSS, ACUTE: Status: ACTIVE | Noted: 2019-05-01

## 2019-05-01 PROBLEM — D62 ANEMIA DUE TO BLOOD LOSS, ACUTE: Chronic | Status: ACTIVE | Noted: 2019-05-01

## 2019-05-01 PROCEDURE — 25000132 ZZH RX MED GY IP 250 OP 250 PS 637: Performed by: ADVANCED PRACTICE MIDWIFE

## 2019-05-01 RX ORDER — ACETAMINOPHEN 325 MG/1
650 TABLET ORAL EVERY 4 HOURS PRN
Qty: 1 BOTTLE | Refills: 0 | Status: SHIPPED | OUTPATIENT
Start: 2019-05-01 | End: 2019-06-13

## 2019-05-01 RX ORDER — LANOLIN ALCOHOL/MO/W.PET/CERES
1000 CREAM (GRAM) TOPICAL DAILY
Status: DISCONTINUED | OUTPATIENT
Start: 2019-05-01 | End: 2019-05-01 | Stop reason: HOSPADM

## 2019-05-01 RX ORDER — LANOLIN 100 %
OINTMENT (GRAM) TOPICAL
COMMUNITY
Start: 2019-05-01 | End: 2019-06-13

## 2019-05-01 RX ORDER — AMOXICILLIN 250 MG
1-2 CAPSULE ORAL 2 TIMES DAILY PRN
Qty: 60 TABLET | Refills: 1 | Status: SHIPPED | OUTPATIENT
Start: 2019-05-01 | End: 2020-05-18

## 2019-05-01 RX ORDER — IBUPROFEN 600 MG/1
600 TABLET, FILM COATED ORAL EVERY 6 HOURS PRN
Qty: 60 TABLET | Refills: 1 | Status: SHIPPED | OUTPATIENT
Start: 2019-05-01 | End: 2019-06-13

## 2019-05-01 RX ORDER — FERROUS SULFATE 325(65) MG
325 TABLET ORAL
Qty: 30 TABLET | Refills: 0 | Status: SHIPPED | OUTPATIENT
Start: 2019-05-01 | End: 2019-06-13

## 2019-05-01 RX ORDER — CALCIUM CARBONATE 500 MG/1
1 TABLET, CHEWABLE ORAL EVERY 4 HOURS PRN
COMMUNITY
Start: 2019-05-01 | End: 2019-05-13

## 2019-05-01 RX ADMIN — ACETAMINOPHEN 650 MG: 325 TABLET, FILM COATED ORAL at 00:23

## 2019-05-01 RX ADMIN — FERROUS SULFATE TAB 325 MG (65 MG ELEMENTAL FE) 325 MG: 325 (65 FE) TAB at 08:21

## 2019-05-01 RX ADMIN — IBUPROFEN 800 MG: 400 TABLET ORAL at 06:37

## 2019-05-01 RX ADMIN — ASCORBIC ACID TAB 250 MG 250 MG: 250 TAB at 08:21

## 2019-05-01 RX ADMIN — CYANOCOBALAMIN TAB 1000 MCG 1000 MCG: 1000 TAB at 08:21

## 2019-05-01 RX ADMIN — DOCUSATE SODIUM 100 MG: 100 CAPSULE, LIQUID FILLED ORAL at 08:21

## 2019-05-01 RX ADMIN — IBUPROFEN 800 MG: 400 TABLET ORAL at 00:23

## 2019-05-01 RX ADMIN — ACETAMINOPHEN 650 MG: 325 TABLET, FILM COATED ORAL at 06:37

## 2019-05-01 NOTE — PLAN OF CARE
Vitals stable. Up ad layo well. Voiding without difficulty. Breastfeeding going well. Infant feeding frequently. Nipples sore. Has lanolin, shells, hydrogels. Pain controlled with tylenol and ibuprofen. Anticipate discharge home today with infant.

## 2019-05-01 NOTE — PLAN OF CARE
Vital signs stable.  Voiding without difficulty.  Pain well controlled with ibuprofen and tylenol.  Breastfeeding well.

## 2019-05-01 NOTE — DISCHARGE SUMMARY
[facility]    Discharge Summary  Obstetrics    Date of Admission:  2019  Date of Discharge:  2019  Discharging Provider: Corazon Houston  Date of Service (when I saw the patient): 19    Discharge Diagnoses     Lactating mother  Anemia  PPH d/t retained placenta with manual removal    History of Present Illness   Shiraz Boykin is a 33 year old female who presented with SROM without labor.  Her labor was induced with PO misoprostol and also later augmented with IV pitocin.  Labor progressed with augmentation and Shiraz had  of viable infant girl on 19.     Hospital Course   The patient's hospital course was unremarkable.  She recovered as anticipated and experienced post-delivery complications of PPH d/t retained placenta and anemia.  On discharge, her pain was well controlled. Vaginal bleeding is stable.  Voiding without difficulty.  Ambulating well and tolerating a normal diet.  No fever.  Breastfeeding well.  Infant is stable.  She was discharged on post-partum day #2.    Post-partum hemoglobin:   Hemoglobin   Date Value Ref Range Status   2019 8.8 (L) 11.7 - 15.7 g/dL Final       Corazon Houston    Discharge Disposition   Discharged to home   Condition at discharge: Stable    Primary Care Physician   [facility]    Consultations This Hospital Stay   ANESTHESIOLOGY IP CONSULT  HOME CARE POST PARTUM/ IP CONSULT  LACTATION IP CONSULT    Discharge Orders      Activity    Review discharge instructions     Reason for your hospital stay    Maternity care     Discharge Instructions - Postpartum visit    Schedule postpartum visit with your provider and return to clinic in 2 and 6 weeks.     Follow Up and recommended labs and tests    Follow up with any midwife, within 2 weeks. for hospital follow- up.  The following labs/tests are recommended: hemoglobin.     Diet    Resume previous diet     Discharge Medications   Discharge Medication  List as of 5/1/2019  9:46 AM      START taking these medications    Details   acetaminophen (TYLENOL) 325 MG tablet Take 2 tablets (650 mg) by mouth every 4 hours as needed for mild pain or fever (greater than or equal to 38  C /100.4  F (oral) or 38.5  C/ 101.4  F (core).), Disp-1 Bottle, R-0, Local Print      calcium carbonate (TUMS) 500 MG chewable tablet Take 1 tablet (500 mg) by mouth every 4 hours as needed for heartburn, OTC      cyanocobalamin (CYANOCOBALAMIN) 1000 MCG tablet Take 1 tablet (1,000 mcg) by mouth daily, Disp-30 tablet, R-0, Local PrintTake with iron supplement      ferrous sulfate (FEROSUL) 325 (65 Fe) MG tablet Take 1 tablet (325 mg) by mouth daily (with breakfast), Disp-30 tablet, R-0, Local Print      ibuprofen (ADVIL/MOTRIN) 600 MG tablet Take 1 tablet (600 mg) by mouth every 6 hours as needed for other (cramping), Disp-60 tablet, R-1, Local Print      lanolin ointment Apply topically every hour as needed for dry skin (sore nipples)OTC      senna-docusate (SENOKOT-S/PERICOLACE) 8.6-50 MG tablet Take 1-2 tablets by mouth 2 times daily as needed for constipation, Disp-60 tablet, R-1, Local Print      vitamin C (ASCORBIC ACID) 250 MG TABS tablet Take 1 tablet (250 mg) by mouth daily, Disp-30 tablet, R-0, Local PrintTake with iron supplement         CONTINUE these medications which have NOT CHANGED    Details   fish oil-omega-3 fatty acids (OMEGA-3 FISH OIL) 1000 MG capsule Take 1 g by mouth daily, Historical      Prenatal Multivit-Min-Fe-FA (PRENATAL VITAMINS PO) Historical      !! order for DME Equipment being ordered: double electric breast pumpDisp-1 Device, R-0, Local Print      !! order for DME Equipment being ordered: pregnancy support beltDisp-1 Device, R-0, Local Print       !! - Potential duplicate medications found. Please discuss with provider.        Allergies   No Known Allergies

## 2019-05-01 NOTE — PROGRESS NOTES
CNM Postpartum Discharge Note    SIGNIFICANT PROBLEMS:  Patient Active Problem List    Diagnosis Date Noted     Anemia due to blood loss, acute 2019     Priority: Medium      (normal spontaneous vaginal delivery) 2019     Priority: Medium     Third-stage postpartum hemorrhage 2019     Priority: Medium     Lactating mother 2019     Priority: Medium     Pregnancy related condition 2019     Priority: Medium     Indication for care in labor or delivery 2019     Priority: Medium     Supervision of high risk pregnancy in third trimester 2019     Priority: Medium     FOB: Valentín    WOLF: May 5, 2019  B NEG/anterior/GIRL  Genetic: Decline     Tdap:          Flu: Work          GBS:      Rhogam:   Problems  F/U next visit    Spina Bifida, Spondy  Pap PP  1 Hr GCT/Hgb: Passed 138, 11.0           Spina bifida occulta 2019     Priority: Medium     Plan for early anesthesia consult in labor       Crohn's disease without complication (H) 2019     Priority: Medium     Shiraz states she is unsure if this was a correct diagnosis; does not take any medications or see any providers for this       History of ulcer disease 2019     Priority: Medium     Spondylolisthesis 2019     Priority: Medium     L5       Cervical cancer screening 2019     Priority: Medium     Pap smear due PP       Rh negative state in antepartum period, third trimester 2019     Priority: Medium         SUBJECTIVE:  Patient is stable and is tolerating acitivity well  Baby is rooming in  Complications since 2 hours post delivery: Postpartum Hemorrhage d/t retained placenta  Pain is well controlled.  Patient is taking pain medications.  Breastfeeding status:initiated and well established   Elimination:  She is voiding without difficulty.  She has not had a bowel movement  Desired contraception Unsure  Denies heavy bleeding and passing large clots.    INTERVAL HISTORY:  /83 (BP  Location: Right arm)   Pulse 85   Temp 98.8  F (37.1  C) (Oral)   Resp 16   LMP 07/29/2018   SpO2 90%   Breastfeeding? Unknown     Constitutional: healthy, alert and no distress    Breasts: Currently breastfeeding    Fundus: Uterine fundus is firm, non-tender and at 1FB below the level of the umbilicus    Perineum: Perineum is well approximated, minimal swelling    Lochia: Lochia is appropriate for the duration of time since delivery.     Postpartum hemoglobin   Hemoglobin   Date Value Ref Range Status   04/30/2019 8.8 (L) 11.7 - 15.7 g/dL Final     Blood type   Lab Results   Component Value Date    ABO B 04/28/2019       Lab Results   Component Value Date    RH Neg 04/28/2019     Rubella status   Lab Results   Component Value Date    RUBELLAABIGG Immune 10/02/2018     History of depression:  no    ASSESSMENT/PLAN:  Normal postpartum course  Stable Post-partum day #2  Complications:anemic, plan for iron supplementation  Postpartum warning s/s reviewed, including bleeding/clots, fever, mastitis & thromboemboli   Exercise, diet and rest reviewed  PP Jaylene/pantera reviewed  Continue prenatal vitamins while breastfeeding  Birthcontrol planned:Undecided.   Educated on postpartum blues and postpartum depression warnings signs/symptoms  2 week follow up visit for hemoglobin check  Follow-up in 2 and 6 weeks with CNMs at [facility]  Plan d/c home today    Current Discharge Medication List      START taking these medications    Details   acetaminophen (TYLENOL) 325 MG tablet Take 2 tablets (650 mg) by mouth every 4 hours as needed for mild pain or fever (greater than or equal to 38  C /100.4  F (oral) or 38.5  C/ 101.4  F (core).)  Qty: 1 Bottle, Refills: 0    Associated Diagnoses: Indication for care in labor or delivery; Lactating mother; Anemia due to blood loss, acute      calcium carbonate (TUMS) 500 MG chewable tablet Take 1 tablet (500 mg) by mouth every 4 hours as needed for  heartburn    Associated Diagnoses: Indication for care in labor or delivery; Lactating mother; Anemia due to blood loss, acute      cyanocobalamin (CYANOCOBALAMIN) 1000 MCG tablet Take 1 tablet (1,000 mcg) by mouth daily  Qty: 30 tablet, Refills: 0    Comments: Take with iron supplement  Associated Diagnoses: Indication for care in labor or delivery; Lactating mother; Anemia due to blood loss, acute      ferrous sulfate (FEROSUL) 325 (65 Fe) MG tablet Take 1 tablet (325 mg) by mouth daily (with breakfast)  Qty: 30 tablet, Refills: 0    Associated Diagnoses: Indication for care in labor or delivery; Lactating mother; Anemia due to blood loss, acute      ibuprofen (ADVIL/MOTRIN) 600 MG tablet Take 1 tablet (600 mg) by mouth every 6 hours as needed for other (cramping)  Qty: 60 tablet, Refills: 1    Associated Diagnoses: Indication for care in labor or delivery; Lactating mother; Anemia due to blood loss, acute      lanolin ointment Apply topically every hour as needed for dry skin (sore nipples)    Associated Diagnoses: Indication for care in labor or delivery; Lactating mother; Anemia due to blood loss, acute      senna-docusate (SENOKOT-S/PERICOLACE) 8.6-50 MG tablet Take 1-2 tablets by mouth 2 times daily as needed for constipation  Qty: 60 tablet, Refills: 1    Associated Diagnoses: Indication for care in labor or delivery; Lactating mother; Anemia due to blood loss, acute      vitamin C (ASCORBIC ACID) 250 MG TABS tablet Take 1 tablet (250 mg) by mouth daily  Qty: 30 tablet, Refills: 0    Comments: Take with iron supplement  Associated Diagnoses: Indication for care in labor or delivery; Lactating mother; Anemia due to blood loss, acute         CONTINUE these medications which have NOT CHANGED    Details   fish oil-omega-3 fatty acids (OMEGA-3 FISH OIL) 1000 MG capsule Take 1 g by mouth daily      Prenatal Multivit-Min-Fe-FA (PRENATAL VITAMINS PO)       !! order for DME Equipment being ordered: double electric  breast pump  Qty: 1 Device, Refills: 0    Associated Diagnoses: Exclusively breastfeed infant      !! order for DME Equipment being ordered: pregnancy support belt  Qty: 1 Device, Refills: 0    Associated Diagnoses: Pain of both hip joints       !! - Potential duplicate medications found. Please discuss with provider.          JOAQUIN Sena CNM

## 2019-05-01 NOTE — PLAN OF CARE
VSS. Voiding adequately on own. Scant vag bleeding. Pain controlled w/ ibuprofen and tylenol. Breast feeding well independently.

## 2019-05-01 NOTE — PLAN OF CARE
Mom and baby discharge instructions discussed with pt and spouse who verbalize understanding. Pt has breast pump at home. Discharge prescriptions filled at discharge pharmacy and given to pt. Mom and baby ID bands matched. Security alarms removed. Mom and baby ready for discharge to home.

## 2019-05-01 NOTE — LACTATION NOTE
Routine visit. Shiraz is discharging home today with her baby and . She was tearful at time of my visit and states her baby has been cluster feeding all night and she's not sure if the baby is getting enough milk. Cluster feeding explained. Discussed how to tell if infant is getting enough milk by monitoring her output and weight loss/gain. Reassured Shiraz that infant's weight loss and output are all as expected for her age. Recommended she continue marking feeds, voids and stools on feeding log once at home and use LC at [facility] as needed. Shiraz and her  appreciative of my visit.

## 2019-05-13 ENCOUNTER — PRENATAL OFFICE VISIT (OUTPATIENT)
Dept: MIDWIFE SERVICES | Facility: CLINIC | Age: 34
End: 2019-05-13
Payer: COMMERCIAL

## 2019-05-13 VITALS — BODY MASS INDEX: 21.56 KG/M2 | WEIGHT: 125.6 LBS | DIASTOLIC BLOOD PRESSURE: 70 MMHG | SYSTOLIC BLOOD PRESSURE: 114 MMHG

## 2019-05-13 DIAGNOSIS — D64.9 LOW HEMOGLOBIN: ICD-10-CM

## 2019-05-13 DIAGNOSIS — S31.41XD LACERATION OF VAGINA, SUBSEQUENT ENCOUNTER: ICD-10-CM

## 2019-05-13 LAB
ERYTHROCYTE [DISTWIDTH] IN BLOOD BY AUTOMATED COUNT: 14.1 % (ref 10–15)
HCT VFR BLD AUTO: 38.1 % (ref 35–47)
HGB BLD-MCNC: 12 G/DL (ref 11.7–15.7)
MCH RBC QN AUTO: 31.7 PG (ref 26.5–33)
MCHC RBC AUTO-ENTMCNC: 31.5 G/DL (ref 31.5–36.5)
MCV RBC AUTO: 101 FL (ref 78–100)
PLATELET # BLD AUTO: 241 10E9/L (ref 150–450)
RBC # BLD AUTO: 3.78 10E12/L (ref 3.8–5.2)
WBC # BLD AUTO: 7.2 10E9/L (ref 4–11)

## 2019-05-13 PROCEDURE — 36415 COLL VENOUS BLD VENIPUNCTURE: CPT | Performed by: ADVANCED PRACTICE MIDWIFE

## 2019-05-13 PROCEDURE — 99207 ZZC POST PARTUM EXAM: CPT | Performed by: ADVANCED PRACTICE MIDWIFE

## 2019-05-13 PROCEDURE — 85027 COMPLETE CBC AUTOMATED: CPT | Performed by: ADVANCED PRACTICE MIDWIFE

## 2019-05-13 NOTE — PROGRESS NOTES
SUBJECTIVE:                                                   Shiraz Boykin is a 33 year old who presents to clinic today for the following health issue(s):  Patient presents with:  Post Partum Exam: hgb check-2 weeks pp      HPI:  Here today for hemoglobin level due to experiencing a PPH of 1400 ml. Also had a manual removal of placenta. Feels fatigued, but unsure if related to being anemic or caring for a .     Denies any other concerns today.     Patient's last menstrual period was 2018.  Menstrual History:   Patient is not sexually active  .  Using none for contraception.   Health maintenance updated:  yes  STI infx testing offered:  Declined    Last PHQ-9 score on record =   PHQ-9 SCORE 2019   PHQ-9 Total Score 0     Last GAD7 score on record =   TED-7 SCORE 2019   Total Score 0     Alcohol Score = 2    Problem list and histories reviewed & adjusted, as indicated.  Additional history: as documented.    Patient Active Problem List   Diagnosis     Supervision of high risk pregnancy in third trimester     Spina bifida occulta     Crohn's disease without complication (H)     History of ulcer disease     Spondylolisthesis     Cervical cancer screening     Rh negative state in antepartum period, third trimester     Pregnancy related condition     Indication for care in labor or delivery      (normal spontaneous vaginal delivery)     Third-stage postpartum hemorrhage     Lactating mother     Anemia due to blood loss, acute     Past Surgical History:   Procedure Laterality Date     APPENDECTOMY       COLONOSCOPY  2008     ENT SURGERY      Created eardrum opening      GI SURGERY  2008    Esophagogastroduodenoscopy     HEAD & NECK SURGERY      T&A      Social History     Tobacco Use     Smoking status: Never Smoker     Smokeless tobacco: Never Used   Substance Use Topics     Alcohol use: No     Frequency: Never      Problem (# of Occurrences) Relation (Name,Age of Onset)     Cancer (2) Paternal Grandmother, Paternal Grandfather            Current Outpatient Medications   Medication Sig     acetaminophen (TYLENOL) 325 MG tablet Take 2 tablets (650 mg) by mouth every 4 hours as needed for mild pain or fever (greater than or equal to 38  C /100.4  F (oral) or 38.5  C/ 101.4  F (core).)     cyanocobalamin (CYANOCOBALAMIN) 1000 MCG tablet Take 1 tablet (1,000 mcg) by mouth daily     ferrous sulfate (FEROSUL) 325 (65 Fe) MG tablet Take 1 tablet (325 mg) by mouth daily (with breakfast)     fish oil-omega-3 fatty acids (OMEGA-3 FISH OIL) 1000 MG capsule Take 1 g by mouth daily     ibuprofen (ADVIL/MOTRIN) 600 MG tablet Take 1 tablet (600 mg) by mouth every 6 hours as needed for other (cramping)     lanolin ointment Apply topically every hour as needed for dry skin (sore nipples)     order for DME Equipment being ordered: double electric breast pump     order for DME Equipment being ordered: pregnancy support belt     Prenatal Multivit-Min-Fe-FA (PRENATAL VITAMINS PO)      senna-docusate (SENOKOT-S/PERICOLACE) 8.6-50 MG tablet Take 1-2 tablets by mouth 2 times daily as needed for constipation     vitamin C (ASCORBIC ACID) 250 MG TABS tablet Take 1 tablet (250 mg) by mouth daily     No current facility-administered medications for this visit.      No Known Allergies    ROS:  12 point review of systems negative other than symptoms noted below.    OBJECTIVE:     /70   Wt 57 kg (125 lb 9.6 oz)   LMP 07/29/2018   BMI 21.56 kg/m    Body mass index is 21.56 kg/m .    PHYSICAL EXAM:  Constitutional:  Appearance: Well nourished, well developed alert, in no acute distress     PELVIC EXAM:  Vulva: No lesions, no adenopathy. Sutures present, perineal and R periurethral and labial lacerations healing well, no signs of infection.     Hospital Lab Results    Hemoglobin 8.8      ASSESSMENT/PLAN:                                                        ICD-10-CM    1. Routine postpartum follow-up Z39.2     2. Postpartum hemorrhage, unspecified type O72.1 CBC with platelets   3. Laceration of vagina, subsequent encounter S31.41XD    4. Low hemoglobin D64.9        COUNSELING:  CBC today  Continue with iron regimen. If hemoglobin is normal can discontinue taking iron supplementation. If still low or borderline, will plan to recheck at 6 weeks PP visit  Needs Pap at 6 weeks.   Discussed exercise, may begin exercise slowly, reviewed limitations.     Luara NUÑEZ CNM

## 2019-06-11 NOTE — PROGRESS NOTES
Midwife Postpartum 6 Week Visit    Shiraz Boykin is a 34 year old here for a postpartum checkup.     Delivery date was 19. She had a  of a viable girl, named Minoo, weight 6 pounds 14.4 oz., with a PPH of 1400 ml and retained placenta requiring a manual removal by Dr. Barragan     Since delivery, she has been breast feeding. States going well. Feels she has an oversupply. Tends to have to pump before a feeding.  She has not had any signs of infection, her lochia stopped after 1, 4 weeks PP weeks. Having some discharge, pink tinged  She has been experiencing intermittent cramping on upper, left quadrant to lower abdomen. Lasts for an hour or two and then goes away. Began on Saturday. Heat and Tylenol helps minimally.   She is voiding and having bowel movements without difficulty.      Contraception was discussed and patient desires condoms.   She has not had intercourse since delivery.   She complains of no perineal discomfort.     Mood is Stable. Feels she is getting enough sleep.   Patient screened for postpartum depression.   Depression Rating was:   Last PHQ-9 score on record =   PHQ-9 SCORE 2019   PHQ-9 Total Score 0     Last GAD7 score on record =   TED-7 SCORE 2019   Total Score 0     Alcohol Score = 1    ROS:  12 point review of systems negative other than symptoms noted below.  Breast: Nipple Discharge  Gastrointestinal: Abdominal Pain  Genitourinary: Cramps and No Periods  Skin: Skin Dryness       Current Outpatient Medications:      acetaminophen (TYLENOL) 325 MG tablet, Take 2 tablets (650 mg) by mouth every 4 hours as needed for mild pain or fever (greater than or equal to 38  C /100.4  F (oral) or 38.5  C/ 101.4  F (core).), Disp: 1 Bottle, Rfl: 0     fish oil-omega-3 fatty acids (OMEGA-3 FISH OIL) 1000 MG capsule, Take 1 g by mouth daily, Disp: , Rfl:      Prenatal Multivit-Min-Fe-FA (PRENATAL VITAMINS PO), , Disp: , Rfl:      senna-docusate (SENOKOT-S/PERICOLACE) 8.6-50 MG tablet,  "Take 1-2 tablets by mouth 2 times daily as needed for constipation, Disp: 60 tablet, Rfl: 1.   OB History    Para Term  AB Living   1 1 1 0 0 1   SAB TAB Ectopic Multiple Live Births   0 0 0 0 1      # Outcome Date GA Lbr Olivier/2nd Weight Sex Delivery Anes PTL Lv   1 Term 19 39w1d 24:55 / 03:51 3.13 kg (6 lb 14.4 oz) F Vag-Spont EPI, Nitrous N JER      Complications: Prolonged PROM (>18 hours)      Name: Minoo      Apgar1: 9  Apgar5: 9     Last pap:  No results found for: PAP  Hgb at 2 week PP check was 12.0    EXAM:  /70   Ht 1.626 m (5' 4\")   Wt 56.4 kg (124 lb 6.4 oz)   LMP 2018   BMI 21.35 kg/m    BMI: Body mass index is 21.35 kg/m .  Constitutional: healthy, alert and no distress  Neck: symmetrical, thyroid normal size, no masses present, no lymphadenopathy present.   Breast Deferred, patient lactating.  Abdomen: soft, non-tender, diastasis 1 FB's    PELVIC EXAM:  Vulva: No lesions, well healed, BUS WNL, no tenderness  Vagina: Moist, pink, discharge normal  well rugated, no lesions  Cervix:smooth, pink, no visible lesions  Uterus: Involuted to normal size, non-tender, no masses palpated  Ovaries: No masses palpated  Pelvic tone: weak  Rectal exam: deferred      ASSESSMENT:   Normal postpartum exam after .    ICD-10-CM    1. Routine postpartum follow-up Z39.2 Pap imaged thin layer screen with HPV - recommended age 30 - 65     HPV High Risk Types DNA Cervical         PLAN:  Results for orders placed or performed in visit on 19   CBC with platelets   Result Value Ref Range    WBC 7.2 4.0 - 11.0 10e9/L    RBC Count 3.78 (L) 3.8 - 5.2 10e12/L    Hemoglobin 12.0 11.7 - 15.7 g/dL    Hematocrit 38.1 35.0 - 47.0 %     (H) 78 - 100 fl    MCH 31.7 26.5 - 33.0 pg    MCHC 31.5 31.5 - 36.5 g/dL    RDW 14.1 10.0 - 15.0 %    Platelet Count 241 150 - 450 10e9/L       Teaching: exercise, birth control and weight/diet  Family Planning:condoms. Will call or be seen if wanting to " discuss other birth control options.   Encourage Kegels and abdominal exercise.  Continue a multivitamin/prenatal supplement, especially if breastfeeding.  Pap smear was obtained today.  Postpartum Hgb was not done today    GDM:  Fasting and 2hr GCT needed:  No  If yes, remind need for annual fasting BG and nutrition/exercise recommendations.    Return to clinic:  In 1 year for annual exam or sooner with other concerns or if abdominal discomfort worsens. Will monitor for now and try comfort measures.     Laura NUÑEZ CNM

## 2019-06-13 ENCOUNTER — PRENATAL OFFICE VISIT (OUTPATIENT)
Dept: MIDWIFE SERVICES | Facility: CLINIC | Age: 34
End: 2019-06-13
Payer: COMMERCIAL

## 2019-06-13 VITALS
WEIGHT: 124.4 LBS | DIASTOLIC BLOOD PRESSURE: 70 MMHG | BODY MASS INDEX: 21.24 KG/M2 | HEIGHT: 64 IN | SYSTOLIC BLOOD PRESSURE: 102 MMHG

## 2019-06-13 PROCEDURE — 87624 HPV HI-RISK TYP POOLED RSLT: CPT | Performed by: ADVANCED PRACTICE MIDWIFE

## 2019-06-13 PROCEDURE — 99207 ZZC POST PARTUM EXAM: CPT | Performed by: ADVANCED PRACTICE MIDWIFE

## 2019-06-13 PROCEDURE — G0145 SCR C/V CYTO,THINLAYER,RESCR: HCPCS | Performed by: ADVANCED PRACTICE MIDWIFE

## 2019-06-13 ASSESSMENT — ANXIETY QUESTIONNAIRES
7. FEELING AFRAID AS IF SOMETHING AWFUL MIGHT HAPPEN: NOT AT ALL
3. WORRYING TOO MUCH ABOUT DIFFERENT THINGS: NOT AT ALL
5. BEING SO RESTLESS THAT IT IS HARD TO SIT STILL: NOT AT ALL
1. FEELING NERVOUS, ANXIOUS, OR ON EDGE: NOT AT ALL
2. NOT BEING ABLE TO STOP OR CONTROL WORRYING: NOT AT ALL
6. BECOMING EASILY ANNOYED OR IRRITABLE: NOT AT ALL
GAD7 TOTAL SCORE: 0

## 2019-06-13 ASSESSMENT — MIFFLIN-ST. JEOR: SCORE: 1254.27

## 2019-06-13 ASSESSMENT — PATIENT HEALTH QUESTIONNAIRE - PHQ9
5. POOR APPETITE OR OVEREATING: NOT AT ALL
SUM OF ALL RESPONSES TO PHQ QUESTIONS 1-9: 0

## 2019-06-14 ASSESSMENT — ANXIETY QUESTIONNAIRES: GAD7 TOTAL SCORE: 0

## 2019-06-18 LAB
COPATH REPORT: NORMAL
PAP: NORMAL

## 2019-06-19 LAB
FINAL DIAGNOSIS: NORMAL
HPV HR 12 DNA CVX QL NAA+PROBE: NEGATIVE
HPV16 DNA SPEC QL NAA+PROBE: NEGATIVE
HPV18 DNA SPEC QL NAA+PROBE: NEGATIVE
SPECIMEN DESCRIPTION: NORMAL
SPECIMEN SOURCE CVX/VAG CYTO: NORMAL

## 2020-03-11 ENCOUNTER — HEALTH MAINTENANCE LETTER (OUTPATIENT)
Age: 35
End: 2020-03-11

## 2020-03-30 NOTE — H&P
CNM Labor Admission History & Physical    Shiraz Boykin is a 33 year old  with an IUP at 39w0d  ; ,   Partner/support Person:  Valnetín  Language: English  [facility]  Provider: CNM's    Shiraz Boykin is admitted to the [facility] on 2019 at 6:15 AM       History of present inllness/Chief Complaint:  Shiraz reports that her membranes ruptured at 1700 last evening. Fluid was clear. She spoke with CNM at that time and elected for expectant management at home. Contractions were not painful and did not progress, so Shiraz reported to [facility] at 0445 for evaluation.    Here with: spontaneous rupture of membranes  Patient reports contractions are Irregular     Baby active Yes  Membranes are ruptured since 1700, verified with Rom+  Bloody show: Yes, having pink spotting per Shiraz   Any changes with medical history since last prenatal visit No    Obstetrical history  Estimated Date of Delivery: May 5, 2019 determined by LMP.  Patient's last menstrual period was 2018.   Dating U/S: No dating US on file in transfer records   Fetal anatomic survey: Normal  Placenta: Anterior    PRENATAL COURSE  Prenatal care began at 24 wks gestation at [facility] for a total of 8 prenatal visits. She had care at [facility] prior to 24 weeks.  Total wt gain 24.5lbs; There is no height or weight on file to calculate BMI.  Prenatal Blood Pressure: WNL  Prenatal course was complicated by spina bifida occulta, spondylolisthesis, Rh negative blood type.  Tdap: 3/12/19  Rhogam: 19    Patient Active Problem List   Diagnosis     Supervision of high risk pregnancy in third trimester     Spina bifida occulta     Crohn's disease without complication (H)     History of ulcer disease     Spondylolisthesis     Cervical cancer screening     Rh negative state in antepartum period, third trimester     Pregnancy related condition     Indication for care in labor or delivery        HISTORY  No Known Allergies  Past Medical History:   Diagnosis Date     Crohn's disease (H)      Spina bifida occulta 2009     Spondylisthesis 2009     Tremor 2017     Ulcer of jejunum 10/2008     Past Surgical History:   Procedure Laterality Date     APPENDECTOMY  1999     COLONOSCOPY  2008     ENT SURGERY      Created eardrum opening      GI SURGERY  2008    Esophagogastroduodenoscopy     HEAD & NECK SURGERY      T&A     Family History   Problem Relation Age of Onset     Cancer Paternal Grandmother      Cancer Paternal Grandfather      Social History     Tobacco Use     Smoking status: Never Smoker     Smokeless tobacco: Never Used   Substance Use Topics     Alcohol use: No     Frequency: Never     OB History    Para Term  AB Living   1 0 0 0 0 0   SAB TAB Ectopic Multiple Live Births   0 0 0 0 0      # Outcome Date GA Lbr Olivier/2nd Weight Sex Delivery Anes PTL Lv   1 Current                LABS:  Lab Results   Component Value Date    ABO B 10/02/2018    ABO B 10/02/2018    ABO B 10/02/2018    ABO B 10/02/2018    ABO B 10/02/2018    ABO B 10/02/2018    ABO B 10/02/2018    RH Neg 10/02/2018    RH Neg 10/02/2018    RH Neg 10/02/2018    RH Neg 10/02/2018    RH Neg 10/02/2018    AS Neg 2019    HGB 11.0 (L) 2019    HEPBANG Nonreactive 10/02/2018    CHPCRT Negative 10/02/2018    RUBELLAABIGG Immune 10/02/2018       GBS Status:   Lab Results   Component Value Date    GBS Negative 2019     Rubella: Immune    HIV: Non-Reactive   Platelets:  223 on 10/2/18; 230 on 19  1hr GCT:  138    ROS   Pt is alert and oriented  Pt denies significant constitutional symptoms including fever and/or malaise.    Pt denies significant respiratory, cardiovacular, GI, or muscular/skeletal complaints.    Neuro: Denies HA and visual changes  Muscoloskeletal: Denies except for discomforts r/t pregnancy     PHYSICAL EXAM:  /77   Temp 98.1  F (36.7  C) (Temporal)   Resp 16   LMP  2018   Breastfeeding? No   General appearance:  healthy, alert, active and smiling   Heart: RRR  Lungs: CTA bilaterally, normal respiratory effort  Abdomen: gravid, single vertex fetus, non-tender, EFW 7 lbs.   Legs:  no edema     Contractions: Pt is alexis every 3-7 minutes, lasting 50-90 seconds and palpates mild.    Fetal heart tones: Baseline 125   Variability: moderate   Accelerations: present  Decelerations: absent    NST: reactive    Cervix: 1.5/ 60% / Posterior/ soft/ -2, Vtx per MAC RN exam  Bloody show: yes, pink spotting per Shiraz  Membranes:  Ruptured, clear fluid    ASSESSMENT:  33 year old  with ochoa IUP 39w0d ruptured with no labor  NST reactive  GBS negative and membranes ruptured for 13 hours  Chow score 6    PLAN:  Routine CNM care  Labs ordered: Hemoglobin/Platelets and type and hold  Teaching done r/t comfort measures, pain management options, and labor processes.  Admit - see IP orders  Discussed chow score and plan of care with Dia.  Discussed r/b/a of using a ripening agent vs Pitocin. Shiraz consents to using oral misoprostol at this time. Plan to start with oral misoprostol x2 doses; reassess chow score two hours after second dose.  Cervical ripening with misoprostol  Pain medication as requested. Plan for early anesthesia consult in the morning for evaluation of hx of Spina bifida occulta and spondylolisthesis.  Anticipate     JOAQUIN Weinberg CNM   no concerns

## 2020-05-18 ENCOUNTER — VIRTUAL VISIT (OUTPATIENT)
Dept: FAMILY MEDICINE | Facility: CLINIC | Age: 35
End: 2020-05-18
Payer: COMMERCIAL

## 2020-05-18 ENCOUNTER — TELEPHONE (OUTPATIENT)
Dept: FAMILY MEDICINE | Facility: CLINIC | Age: 35
End: 2020-05-18

## 2020-05-18 DIAGNOSIS — R21 RASH AND NONSPECIFIC SKIN ERUPTION: Primary | ICD-10-CM

## 2020-05-18 PROCEDURE — 99213 OFFICE O/P EST LOW 20 MIN: CPT | Mod: GT | Performed by: INTERNAL MEDICINE

## 2020-05-18 RX ORDER — ACETAMINOPHEN 500 MG
500-1000 TABLET ORAL EVERY 6 HOURS PRN
COMMUNITY
End: 2021-11-01

## 2020-05-18 NOTE — TELEPHONE ENCOUNTER
Reason for call:  Patient reporting a symptom    Symptom or request: rash for arm     Duration (how long have symptoms been present): 2 weeks    Have you been treated for this before? No    Additional comments: pt thought it was ring worm. Treated with anti fungal. Has gotten better but not gone. Pt thinks she needs an face to face  Pt will be new to our clinic  Phone Number patient can be reached at:  Home number on file [phone] (home)    Best Time:  any    Can we leave a detailed message on this number:  YES    Call taken on 5/18/2020 at 9:10 AM by Deangelo Beebe

## 2020-07-29 ENCOUNTER — MYC MEDICAL ADVICE (OUTPATIENT)
Dept: MIDWIFE SERVICES | Facility: CLINIC | Age: 35
End: 2020-07-29

## 2020-07-29 DIAGNOSIS — Q76.0 SPINA BIFIDA OCCULTA: Primary | ICD-10-CM

## 2020-08-03 ENCOUNTER — HOSPITAL ENCOUNTER (OUTPATIENT)
Dept: MRI IMAGING | Facility: CLINIC | Age: 35
Discharge: HOME | End: 2020-08-03
Attending: ADVANCED PRACTICE MIDWIFE | Admitting: ADVANCED PRACTICE MIDWIFE
Payer: COMMERCIAL

## 2020-08-03 DIAGNOSIS — Q76.0 SPINA BIFIDA OCCULTA: ICD-10-CM

## 2020-08-03 PROCEDURE — 72148 MRI LUMBAR SPINE W/O DYE: CPT

## 2020-08-11 PROBLEM — O26.90 PREGNANCY RELATED CONDITION: Status: RESOLVED | Noted: 2019-04-28 | Resolved: 2020-08-11

## 2020-08-11 PROBLEM — Z67.91 RH NEGATIVE STATE IN ANTEPARTUM PERIOD, THIRD TRIMESTER: Status: RESOLVED | Noted: 2019-01-17 | Resolved: 2020-08-11

## 2020-08-11 PROBLEM — O26.893 RH NEGATIVE STATE IN ANTEPARTUM PERIOD, THIRD TRIMESTER: Status: RESOLVED | Noted: 2019-01-17 | Resolved: 2020-08-11

## 2020-08-11 PROBLEM — D62 ANEMIA DUE TO BLOOD LOSS, ACUTE: Chronic | Status: RESOLVED | Noted: 2019-05-01 | Resolved: 2020-08-11

## 2020-08-11 NOTE — PROGRESS NOTES
Shiraz is a 35 year old  female who presents for annual exam.     Besides routine health maintenance,  she would like to discuss history of impetigo.  HPI:  Here for annual, doing well overall, thinking about baby #2. Had MRI of back so she wouldn't have any issues with anesthesia. 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 Impetigo as a kid, feels like she has had some recurrance in her nares, some sores, wondering if she can be tested for it, know we likely cannot do testing but would like referral to dermatology or PCP to discuss further.  Menses are regular q 28-30 days and normal lasting 4 days.   Menses flow: normal.    Patient's last menstrual period was 2020 (exact date)..   Using condoms for contraception.    She is not currently considering pregnancy.    REPRODUCTIVE/GYNECOLOGIC HISTORY:  Shiraz is sexually active with male partner(s) and is currently in monogamous relationship.   STI testing offered?  Declined  History of abnormal Pap smear:  No  SOCIAL HISTORY  Abuse: does not report having previously been physical or sexually abused.    Do you feel safe in your environment? YES   She  reports that she has never smoked. She has never used smokeless tobacco.      Last PHQ-9 score on record =   PHQ-9 SCORE 2020   PHQ-9 Total Score 1     Last GAD7 score on record =   TED-7 SCORE 2020   Total Score 0       HEALTH MAINTENANCE:  Cholesterol: (No results found for: CHOL History of abnormal lipids: No  Mammo: NA . History of abnormal Mammo: Not applicable, .  Regular Self Breast Exams: Yes-currently breastfeeding  TSH: (No results found for: TSH )  Pap; (  Lab Results   Component Value Date    PAP NIL HPV- 2019    )  Immunizations up to date: yes  (Gardasil, Tdap, Flu)  Health maintenance updated:  yes    HEALTHY HABITS  Eating habits: eats regular meals  Calcium/Vitamin D intake: source:  dairy Adequate?   Exercise: How often do you exercise? Chasing baby;Active  job    HISTORY:  OB History    Para Term  AB Living   1 1 1 0 0 1   SAB TAB Ectopic Multiple Live Births   0 0 0 0 1      # Outcome Date GA Lbr Olivier/2nd Weight Sex Delivery Anes PTL Lv   1 Term 19 39w1d 24:55 / 03:51 3.13 kg (6 lb 14.4 oz) F Vag-Spont EPI, Nitrous N JER      Complications: Prolonged PROM (>18 hours)      Name: Minoo      Apgar1: 9  Apgar5: 9     Past Medical History:   Diagnosis Date     Anemia due to blood loss, acute 2019     Crohn's disease (H)      Rh negative state in antepartum period, third trimester 2019     Spina bifida occulta 2009     Spondylisthesis 2009     Third-stage postpartum hemorrhage 2019     Tremor 2017     Ulcer of jejunum 10/2008     Past Surgical History:   Procedure Laterality Date     APPENDECTOMY       COLONOSCOPY  2008     ENT SURGERY      Created eardrum opening      GI SURGERY  2008    Esophagogastroduodenoscopy     HEAD & NECK SURGERY      T&A     Family History   Problem Relation Age of Onset     Cancer Paternal Grandmother      Cancer Paternal Grandfather      Social History     Socioeconomic History     Marital status:      Spouse name: Not on file     Number of children: Not on file     Years of education: Not on file     Highest education level: Not on file   Occupational History     Not on file   Social Needs     Financial resource strain: Not on file     Food insecurity     Worry: Not on file     Inability: Not on file     Transportation needs     Medical: Not on file     Non-medical: Not on file   Tobacco Use     Smoking status: Never Smoker     Smokeless tobacco: Never Used   Substance and Sexual Activity     Alcohol use: Yes     Frequency: Never     Comment: few drinks per month     Drug use: No     Sexual activity: Yes     Partners: Male     Birth control/protection: None   Lifestyle     Physical activity     Days per week: Not on file     Minutes per session: Not on file     Stress: Not on  "file   Relationships     Social connections     Talks on phone: Not on file     Gets together: Not on file     Attends Denominational service: Not on file     Active member of club or organization: Not on file     Attends meetings of clubs or organizations: Not on file     Relationship status: Not on file     Intimate partner violence     Fear of current or ex partner: Not on file     Emotionally abused: Not on file     Physically abused: Not on file     Forced sexual activity: Not on file   Other Topics Concern     Parent/sibling w/ CABG, MI or angioplasty before 65F 55M? Not Asked   Social History Narrative     Not on file       Current Outpatient Medications:      Prenatal MV-Min-Fe Fum-FA-DHA (PRENATAL 1 PO), Take 1 tablet by mouth daily, Disp: , Rfl:      acetaminophen (TYLENOL) 500 MG tablet, Take 500-1,000 mg by mouth every 6 hours as needed for mild pain, Disp: , Rfl:    No Known Allergies    Past medical, surgical, social and family history were reviewed and updated in [facility].    ROS:   12 point review of systems negative other than symptoms noted below or in the HPI.    PHYSICAL EXAM:  BP (P) 102/68 (BP Location: Right arm, Patient Position: Chair, Cuff Size: Adult Regular)   Ht (P) 1.615 m (5' 3.58\")   Wt (P) 53.1 kg (117 lb)   LMP 08/13/2020 (Exact Date)   Breastfeeding Yes   BMI (P) 20.35 kg/m     BMI: Body mass index is 20.35 kg/m  (pended).  Constitutional: healthy, alert and no distress  Neck: symmetrical, thyroid normal size, no masses present, no lymphadenopathy present.   Cardiovascular: RRR, no murmurs present  Respiratory: breathing unlabored, lungs CTA bilaterally  Breast:deferred, breastfeeding   Gastrointestinal: abdomen soft, non-tender, bowel sounds present  PELVIC EXAM:  Vulva: No lesions, no adenopathy, BUS WNL  Vagina: Moist, pink, discharge normal  well rugated, no lesions  Cervix:smooth, pink, no visible lesions  Uterus: Normal size, non-tender, mobile  Ovaries: No masses " palpated  Rectal exam: deferred    ASSESSMENT/PLAN:    ICD-10-CM    1. Encounter for gynecological examination without abnormal finding  Z01.419    2. Encounter for preconception consultation  Z31.69    3. Hx of impetigo  Z87.2      No results found for any visits on 08/14/20.      COUNSELING:   Reviewed preventive health counseling, as reflected in patient instructions       Regular exercise       Healthy diet/nutrition       Family planning   reports that she has never smoked. She has never used smokeless tobacco. Discussed preconception, cycle tracking, when to follow if no pregnancy occurs and slightly increased risk of stillbirth/second trimester loss with covid based on limited data  Pap up to date  Routine labs at next annual  Referral for impetigo follow up- discussed treatment can be done by either primary care or dermatology. Discussed locations for FP, internal medicine, and for dermatology within [facility]. Patient to decide which location to choose and schedule follow up with next flare      JOAQUIN Powell, MERRYM

## 2020-08-14 ENCOUNTER — OFFICE VISIT (OUTPATIENT)
Dept: MIDWIFE SERVICES | Facility: CLINIC | Age: 35
End: 2020-08-14
Payer: COMMERCIAL

## 2020-08-14 DIAGNOSIS — Z01.419 ENCOUNTER FOR GYNECOLOGICAL EXAMINATION WITHOUT ABNORMAL FINDING: Primary | ICD-10-CM

## 2020-08-14 DIAGNOSIS — Z87.2: ICD-10-CM

## 2020-08-14 DIAGNOSIS — Z31.69 ENCOUNTER FOR PRECONCEPTION CONSULTATION: ICD-10-CM

## 2020-08-14 PROCEDURE — 99395 PREV VISIT EST AGE 18-39: CPT | Performed by: ADVANCED PRACTICE MIDWIFE

## 2020-08-14 ASSESSMENT — ANXIETY QUESTIONNAIRES
GAD7 TOTAL SCORE: 0
6. BECOMING EASILY ANNOYED OR IRRITABLE: NOT AT ALL
1. FEELING NERVOUS, ANXIOUS, OR ON EDGE: NOT AT ALL
5. BEING SO RESTLESS THAT IT IS HARD TO SIT STILL: NOT AT ALL
3. WORRYING TOO MUCH ABOUT DIFFERENT THINGS: NOT AT ALL
2. NOT BEING ABLE TO STOP OR CONTROL WORRYING: NOT AT ALL
7. FEELING AFRAID AS IF SOMETHING AWFUL MIGHT HAPPEN: NOT AT ALL

## 2020-08-14 ASSESSMENT — MIFFLIN-ST. JEOR: SCORE: 1209.09

## 2020-08-14 ASSESSMENT — PATIENT HEALTH QUESTIONNAIRE - PHQ9
SUM OF ALL RESPONSES TO PHQ QUESTIONS 1-9: 1
5. POOR APPETITE OR OVEREATING: NOT AT ALL

## 2020-08-14 NOTE — PATIENT INSTRUCTIONS
Preventive Health Recommendations  Female Ages 21 - 39  Yearly exam:   See your health care provider every year in order to  1. Review health changes.  2. Discuss preventive care.    3. Review your medicines if you your provider has prescribed any.      You do not need a Pap test if your uterus was removed (hysterectomy) and you have not had cancer.    You should be tested each year for STIs (sexually transmitted diseases) if you're at risk.     Talk to your provider about how often to have your cholesterol checked, about every 5 years if normal.    If you are at risk for diabetes, you should have a diabetes test (fasting glucose).    Vitamin D deficiency screening and thyroid disease screening is also recommended every 3-5 years depending on risk factors or more often if symptomatic  PAP Smear:   Until age 30: Get a Pap test every three years (more often if you have had an abnormal result)    After age 30: Talk to your provider about whether you should have a Pap test every 3 years or have a Pap test with HPV screening every 5 years.   Shots: Get a flu shot each year. Get a tetanus shot every 10 years.   Nutrition:     Eat at least 5 servings of fruits and vegetables each day    Eat REAL food, stay away from processed foods.    Eat whole-grain bread, whole-wheat pasta and brown rice instead of white grains and rice.    For bone health:  Eat calcium-rich foods or take calcium pills (500 to 600 mg) twice a day with food (1200 mg per day). Also take vitamin D (2000 IUs) each day.     Lifestyle    Exercise regularly (30 minutes a day, 5 days of the week). This will help you control your weight and prevent disease.    Weight bearing exercise such as weight lifting, walking, running and yoga will be beneficial later in life preventing osteoporosis     Limit alcohol to one drink per day.    No smoking.     Wear sunscreen to prevent skin cancer.    See your dentist every six months to one year for an exam and cleaning  depending on their recommendation    Get an eye exam every two years or more often if you wear glasses or contacts.      Preconception Care    If you are thinking about becoming pregnant in the near future or actively trying to conceive we want to make sure you are as healthy as possible before becoming pregnant. By meeting with your midwife or provider prior to getting pregnant it allows us to address any health needs that can affect pregnancy. Please discuss your personal and family history with your midwife in order for us to identify any risk factors      If you wish to attempt pregnancy stop whichever form of contraception you use. If you have an IUD it can be removed in the office and you can start trying right away. If you take OCPs finish current pack, cycle, and then you can actively start trying      Make sure all the medications you are taking are safe for pregnancy. This is especially important for women with chronic health conditions such as diabetes, seizure disorders, and/or hypertension. If you are unsure if your medications are safe we can discuss them with you, do not abruptly stop taking something if you've been prescribed a medication by a medical provider      Folic acid 400-800 mcg per day by mouth daily, begin this routine 1 to 12 months prior to planned conception, and continue through at least 13 weeks gestation. Some prenatal/multi-vitamins contain enough folic acid       Be up to date on all your vaccines. Avoid pregnancy for 1 month after administration of varicella/ Rubella (MMR) vaccines      We encourage all women to be at healthy BMI (<26) when attempting pregnancy, it is healthier for both you and your baby. If you are overweight you can make a healthy choice by eating better and exercising more. Waiting to get pregnant at a healthy weight is an excellent choice.                                       Eat a healthy, well-balanced diet containing lots of fresh fruit and vegetables  (frozen without added sugar is okay), protein, and healthy carbohydrates such as whole wheat breads and pastas      Exercise regularly. If you are wishing to get pregnant maintain normal exercise routine. If you don't exercise this is a great time for light activity daily such as walking/swimming      Limit caffeine intake to less than 200 mg per day, typically 1-2 cups of regular coffee is about 200 mg.       Avoid cigarettes, alcohol, and recreational drug use while attempting pregnancy. If you are actively trying to conceive but you get your period or a negative pregnancy test it is okay to have alcohol in moderation until you are actively trying again      If you have the potential to toxic chemical exposures in your work place or home please use special precautions    Menstrual Cycles      Knowing your cycle is incredibly important when attempting pregnancy      Your cycle begins day 1 of your period and goes until the 1st day of your next period. Typically women have 28-32 day cycles. If your cycles are shorter or longer it can be a normal variation.       Women typically ovulate in the middle of their cycle      There are many great apps that can help you track you cycle monthly to establish when you are ovulating      You may also start taking your temperature daily with a basal body temp thermometer to help identify when you ovulate      There are also ovulation predictor kits available over the counter at the pharmacy      If you want more information please contact your midwife      It can take up to 1 year for a woman under the age of 35 or 6 months for a woman over the age of 35 to conceive. If it takes longer than this we would like to evaluate you for fertility issues.

## 2020-08-15 ASSESSMENT — ANXIETY QUESTIONNAIRES: GAD7 TOTAL SCORE: 0

## 2021-01-03 ENCOUNTER — HEALTH MAINTENANCE LETTER (OUTPATIENT)
Age: 36
End: 2021-01-03

## 2021-02-15 ENCOUNTER — OFFICE VISIT (OUTPATIENT)
Dept: INTERNAL MEDICINE | Facility: CLINIC | Age: 36
End: 2021-02-15
Payer: COMMERCIAL

## 2021-02-15 VITALS
SYSTOLIC BLOOD PRESSURE: 98 MMHG | OXYGEN SATURATION: 99 % | RESPIRATION RATE: 16 BRPM | BODY MASS INDEX: 21.75 KG/M2 | WEIGHT: 126.7 LBS | DIASTOLIC BLOOD PRESSURE: 64 MMHG | HEART RATE: 82 BPM

## 2021-02-15 DIAGNOSIS — L01.00 IMPETIGO: Primary | ICD-10-CM

## 2021-02-15 PROCEDURE — 99213 OFFICE O/P EST LOW 20 MIN: CPT | Performed by: PHYSICIAN ASSISTANT

## 2021-02-15 RX ORDER — CEPHALEXIN 500 MG/1
500 CAPSULE ORAL 3 TIMES DAILY
Qty: 21 CAPSULE | Refills: 0 | Status: SHIPPED | OUTPATIENT
Start: 2021-02-15 | End: 2021-02-22

## 2021-02-15 NOTE — PATIENT INSTRUCTIONS
Oral and topical antibiotic as discussed today   Ointment for 5 days.    After this use thin layer of vaseline on the inside of the nare to help with dryness also can use saline nasal spray for moisture.

## 2021-02-15 NOTE — PROGRESS NOTES
Assessment & Plan     Impetigo    - cephALEXin (KEFLEX) 500 MG capsule; Take 1 capsule (500 mg) by mouth 3 times daily for 7 days             Patient Instructions   Oral and topical antibiotic as discussed today   Ointment for 5 days.    After this use thin layer of vaseline on the inside of the nare to help with dryness also can use saline nasal spray for moisture.       Return in about 1 year (around 2/15/2022) for Routine Visit, regular primary provider.    Kelsi Carmona PA-C  [facility] VICENTE Weldon is a 35 year old who presents for the following health issues     HPI       Concern - Sore in nose  Onset: 02/11/2021  Description: Has a sore that is bothersome in left nostril  Intensity: mild, moderate  Progression of Symptoms:  Now a scab  Accompanying Signs & Symptoms:   Previous history of similar problem:   Precipitating factors:        Worsened by:   Alleviating factors:        Improved by:   Therapies tried and outcome:  none   Issues in the past with sores in nose especially when dry in the winter  Has used bactroban ointment in the past and this does help and work  Wondering if needs different treatment         Review of Systems   Constitutional, HEENT, cardiovascular, pulmonary, gi and gu systems are negative, except as otherwise noted.      Objective    BP 98/64   Pulse 82   Resp 16   Wt 57.5 kg (126 lb 11.2 oz)   LMP 02/03/2021 (Approximate)   SpO2 99%   BMI (P) 22.03 kg/m    Body mass index is 22.03 kg/m  (pended).  Physical Exam   GENERAL: healthy, alert and no distress  HENT: normal cephalic/atraumatic and left nare friable with crusting noted.   Right nare dry   RESP: lungs clear to auscultation - no rales, rhonchi or wheezes  CV: regular rates and rhythm and normal S1 S2, no S3 or S4  SKIN: no suspicious lesions or rashes

## 2021-04-02 ENCOUNTER — PRENATAL OFFICE VISIT (OUTPATIENT)
Dept: NURSING | Facility: CLINIC | Age: 36
End: 2021-04-02
Payer: COMMERCIAL

## 2021-04-02 DIAGNOSIS — O09.90 SUPERVISION OF HIGH-RISK PREGNANCY: ICD-10-CM

## 2021-04-02 DIAGNOSIS — O09.91 SUPERVISION OF HIGH RISK PREGNANCY IN FIRST TRIMESTER: Primary | ICD-10-CM

## 2021-04-02 PROCEDURE — 99207 PR NO CHARGE NURSE ONLY: CPT

## 2021-04-02 RX ORDER — FOLIC ACID 0.8 MG
1 TABLET ORAL DAILY
COMMUNITY
Start: 2021-03-06 | End: 2021-09-16

## 2021-04-02 SDOH — ECONOMIC STABILITY: FOOD INSECURITY: WITHIN THE PAST 12 MONTHS, THE FOOD YOU BOUGHT JUST DIDN'T LAST AND YOU DIDN'T HAVE MONEY TO GET MORE.: NOT ASKED

## 2021-04-02 SDOH — ECONOMIC STABILITY: TRANSPORTATION INSECURITY
IN THE PAST 12 MONTHS, HAS THE LACK OF TRANSPORTATION KEPT YOU FROM MEDICAL APPOINTMENTS OR FROM GETTING MEDICATIONS?: NOT ASKED

## 2021-04-02 SDOH — ECONOMIC STABILITY: INCOME INSECURITY: HOW HARD IS IT FOR YOU TO PAY FOR THE VERY BASICS LIKE FOOD, HOUSING, MEDICAL CARE, AND HEATING?: NOT ASKED

## 2021-04-02 SDOH — ECONOMIC STABILITY: TRANSPORTATION INSECURITY
IN THE PAST 12 MONTHS, HAS LACK OF TRANSPORTATION KEPT YOU FROM MEETINGS, WORK, OR FROM GETTING THINGS NEEDED FOR DAILY LIVING?: NOT ASKED

## 2021-04-02 SDOH — ECONOMIC STABILITY: FOOD INSECURITY: WITHIN THE PAST 12 MONTHS, YOU WORRIED THAT YOUR FOOD WOULD RUN OUT BEFORE YOU GOT MONEY TO BUY MORE.: NOT ASKED

## 2021-04-02 ASSESSMENT — ANXIETY QUESTIONNAIRES
2. NOT BEING ABLE TO STOP OR CONTROL WORRYING: SEVERAL DAYS
7. FEELING AFRAID AS IF SOMETHING AWFUL MIGHT HAPPEN: NOT AT ALL
6. BECOMING EASILY ANNOYED OR IRRITABLE: SEVERAL DAYS
5. BEING SO RESTLESS THAT IT IS HARD TO SIT STILL: NOT AT ALL
1. FEELING NERVOUS, ANXIOUS, OR ON EDGE: NOT AT ALL
IF YOU CHECKED OFF ANY PROBLEMS ON THIS QUESTIONNAIRE, HOW DIFFICULT HAVE THESE PROBLEMS MADE IT FOR YOU TO DO YOUR WORK, TAKE CARE OF THINGS AT HOME, OR GET ALONG WITH OTHER PEOPLE: NOT DIFFICULT AT ALL
3. WORRYING TOO MUCH ABOUT DIFFERENT THINGS: NOT AT ALL
GAD7 TOTAL SCORE: 2

## 2021-04-02 ASSESSMENT — PATIENT HEALTH QUESTIONNAIRE - PHQ9
SUM OF ALL RESPONSES TO PHQ QUESTIONS 1-9: 4
5. POOR APPETITE OR OVEREATING: NOT AT ALL

## 2021-04-02 NOTE — PROGRESS NOTES
SUBJECTIVE:     HPI:    This is a 35 year old female patient,  who presents for her first obstetrical visit.    WOLF: 11/10/2021, by Last Menstrual Period.  She is 8w2d weeks.  Her cycles are regular.  Her last menstrual period was normal.   Since her LMP, she has experienced  fatigue).   She denies nausea, emesis, abdominal pain, headache, loss of appetite, vaginal discharge, dysuria, pelvic pain, urinary urgency, lightheadedness, urinary frequency, vaginal bleeding, hemorrhoids and constipation.    Additional History:AMA, Spina Bifida,Spondylolisthesis, Ulcer of jejunum,  RH neg, PPH d/t retained placenta with manual removal, anemia    Have you travelled during the pregnancy?No  Have your sexual partner(s) travelled during the pregnancy?No      HISTORY:   Planned Pregnancy: Yes  Marital Status:   Occupation: Nurse at [employer]  Living in Household: Spouse and Children    Past History:  Her past medical history   Past Medical History:   Diagnosis Date     Anemia due to blood loss, acute 2019     Crohn's disease (H)      Excessive postpartum bleeding      Rh negative state in antepartum period, third trimester 2019     Spina bifida occulta 2009     Spondylisthesis 2009     Third-stage postpartum hemorrhage 2019     Tremor 2017     Ulcer of jejunum 10/2008     Varicella    .      She has a history of   at 39weeks, prolonged PROM >18hrs. PPH d/t retained placenta with manual removal    Since her last LMP she denies use of alcohol, tobacco and street drugs.    Past medical, surgical, social and family history were reviewed and updated in [facility].        Current Outpatient Medications   Medication     folic acid 800 MCG tablet     Prenatal MV-Min-Fe Fum-FA-DHA (PRENATAL 1 PO)     acetaminophen (TYLENOL) 500 MG tablet     No current facility-administered medications for this visit.        ROS:   12 point review of systems negative  other than symptoms noted below or in the HPI.  Constitutional: Fatigue      OBJECTIVE:     EXAM:  LMP 02/03/2021 (Approximate)  There is no height or weight on file to calculate BMI.        Nurse phone visit completed. Prenatal book and folder (containing standard educational hand-outs and brochures) will be given at the next visit to patient. Information in folder reviewed over the phone. Questions answered. Brochure given on optional screening available to assess chromosomal anomalies. Pt undecided NIPT. Pt advised to call the clinic if she has any questions or concerns related to her pregnancy. Prenatal labs future ordered. New prenatal visit scheduled on 4/13/21 with Laura Fisher CNM.      Lab Results   Component Value Date    PAP NIL 06/13/2019     PHQ-9 score:    PHQ 4/2/2021   PHQ-9 Total Score 4   Q9: Thoughts of better off dead/self-harm past 2 weeks Not at all       TED-7 SCORE 6/13/2019 8/14/2020 4/2/2021   Total Score 0 0 2       Patient supplied answers from flow sheet for:  Prenatal OB Questionnaire.  Past Medical History  Does anyone in your home smoke?: No  Have you ever ?: (!) Yes  Have you been hospitalized for a nonsurgical reason excluding normal delivery?: (!) Yes        Carine Foley RN

## 2021-04-03 ASSESSMENT — ANXIETY QUESTIONNAIRES: GAD7 TOTAL SCORE: 2

## 2021-04-06 PROBLEM — O09.90 SUPERVISION OF HIGH-RISK PREGNANCY: Status: ACTIVE | Noted: 2021-04-06

## 2021-04-12 NOTE — PATIENT INSTRUCTIONS
Thank you for coming to see the Midwives at the   [facility]!      We will notify you about your labs that were drawn today once we get the results back.  If you have [employer] your lab results will be posted there.      Someone from the clinic will call you personally or send you a [employer] message with your results.      If you need any refills of medications please call your pharmacy and they will contact us.      If you have a medical emergency please call 911.      If you have any concerns about today's visit, wish to schedule another appointment, or have an urgent medical concern please call our office at [phone]. You can also make appointments through [employer].      After hours you may also call the clinic number above to be connected with [place]'s after hours triage nurse.  The nurse can page the midwife on call if needed. There is always a midwife on call 24 hours a day.    Prenatal Care Recommendations:    Before 14 weeks: Dating ultrasound, genetic testing       This ultrasound helps us determine your dates accurately. Innatal (genetic screening test) can be drawn anytime after 10 weeks of gestation.    16 weeks: Optional genetic testing single AFP       This testing helps understand your baby's risk for some genetic abnormalities.    18-22 weeks:  Screening anatomy ultrasound       This testing will look for early growth abnormalities, placenta location, and may tell the baby's gender if you wish to find out.    24-27 weeks: One hour diabetes test (GCT) and complete blood count       This test helps identify diabetes of pregnancy or gestational diabetes.  We also look   at the iron in your blood and how well your blood clots.    28 - 36 weeks: Tetanus shot (Tdap)       This shot helps protect you and your baby from whooping cough.    36 weeks and later: Group B Strep test (GBS)       This test helps predict if you need antibiotics in labor to prevent infection for your baby.    Anytime  September to April:  Flu shot       This shot helps protect you and your family from the flu.  This is especially important during pregnancy.        The typical schedule after your first visit today you can expect:     Visit 2 - 12-16 weeks  Visit 3 - 20 weeks  Visit 4 - 24 weeks  Visit 5 - 28 weeks  Visit 6 - 30 weeks  Visit 7 - 32 weeks  Visit 8 - 34 weeks  Visit 9 - 36 weeks  Weekly after 36 weeks until delivery.        Any time during or after your pregnancy you may experience increased depression and/or mood changes.    We are here to support you. Please contact us if you are:    Feeling anxious    Overwhelmed or sad     Trouble sleeping    Crying uncontrollably    Trouble caring for yourself or baby.    Any thoughts of hurting yourself, your baby, or anyone else    If anything comes up between your visits or you have concerns please don't hesitate to contact us.    Secure access to your medical record:  Use [employer] (secure email communication and access to your chart) to send your primary care provider a message or make an appointment. Ask someone on your Team how to sign up for [employer]. To log on to [employer] or for more information in [employer] please visit the website at [email or web address].       Certified Nurse Midwife (CNM) Team    SARBJIT Zamora, JOAQUIN Foss, SARBJIT, [facility]  Jhonny Sheridan DNP, APRN, CNM  Susan Israel DNP, APRMAXINE, CNM      Again, thank you for choosing the midwives at [facility].  We are excited to be a part of your pregnancy. Please let us know how we can best partner with you to improve your and your family's health.    Remedies for nausea and vomiting with pregnancy      Eat small frequent meals every 2-3 hours if possible.       Avoid food at extremes of temperature and drinks with carbonation.      Eat foods that appeal to you, avoiding fats and spicy foods.      Avoid liquids with foods.  Drink liquids 30-60 minutes  before or after eating and sip slowly.      Bread, pasta, crackers, potatoes, and rice tend to be tolerated the best.      Don't worry about what you eat in the first 3 months, it is more important that you can eat and keep it down.       Try flat ginger ale or ginger tea      Before rising in the morning, eat a small amount of crackers or dry toast.      Peppermint tea      Ginger is a herbal remedy for nausea and you can use it in any form.  There are ginger tablets you can purchase.  The dose 1000 mg a day in divided doses.       You may also try doxylamine (Unisom) 12.5 mg three times a day which is a sleeping medication along with Vitamin B6 25 mg three times a day.  This combination takes up to a week to work so give it some time.       Benadryl (diphenhydramine) 25-50 mg every 8 hour or Dramamine (dimenhydrinate)  mg by mouth every 4-6 hours. Both of these medication may cause some drowsiness      Other things that may help include an Accupressure band and acupuncture      If these methods fail there are many prescriptions that we can try    If you begin to vomit more than 5 or 6 times a day and feel that you are unable to keep anything down, call the [facility] at [phone]    Genetic Screening in Pregnancy    There are several options you have for genetic screening in your pregnancy.  Everyone has their own personal reasons to screen or not to screen.  We want you to make the best choice for you and your pregnancy.  Below is a list of options, what they screen for and when the screening is done.  Genetic screening is recommended for women who are 35 and older at delivery and for those with a family history of chromosomal abnormalities. However, screening is offered to all women.    Innatal:    This is a screening for the more common chromosome abnormalities, including trisomy 21 (Down's syndrome), trisomy 18, trisomy 13 and sex chromosome abnormalities. This is a prenatal test that  "can be done as early as 10 weeks gestation.       A maternal blood sample is drawn that sequences cell-free DNA in maternal blood stream. This in turn allows for molecular counting of chromosome copy numbers with a >99% detection rate of Down syndrome, a 97% detection rate of Trisomy 18, and a 78% detection rate of Trisomy 13.    This test will give information about the gender of the baby.  You can choose to not have that disclosed.       Results come back within 10-14 business days.     About 5% of samples will have results that are designated as \"indeterminate\" or \"uninterruptable.\" With this type of result, genetic counseling and diagnostic testing are recommended. Remember this is a screening test and does not definitively diagnose or exclude the presence of these chromosome conditions.     This screening may or may not be covered by insurance.  We recommend you consult your insurance company to discuss.  Financial assistance is available at a low cost if not covered by your insurance.       Standard Screen:  This test screens for 23 disorders that cause serious health effects in infancy or childhood.  Most hereditary genetic disorders are autosomal recessive, meaning both parents must be carriers for the child to be at risk.  There are some X-linked disorders where only the mother needs to be a carrier for the child to be at risk.   Below are some of the more common of the 23 disorders screened for:     1.  Cystic Fibrosis (CF) is the most common life-shortening autosomal  recessive disease among  populations, with a frequency of 1 in  Every 3,500 live births. Although it mainly affects the   Population anyone can request screening. If you have a family history of  cystic fibrosis you should request this test.  This screening is a blood  draw.      2.  Spinal Muscular Atrophy (SMA) is the most common inherited cause  of infant death.  The most common form of this disorder causes death by " "a age two.  One in every 6,000 to 10,000 babies born in the [place] has SMA.      3.  Fragile X Syndrome (FXS) is the most common inherited cause of  intellectual disability.  Approximately 1 in every 3,600 boys and 1 in every  6,000 girls is born with FXS.      4.  Hemoglobinopathy Evaluation:  Hemoglobin electrophoresis is a  non-invasive blood test that looks at abnormal hemoglobin (component of  red blood cells) production. Examples of this include sickle cell anemia  (which is a disorder of the red blood cells and their shape) and the  thalassemia s (which is also a form of anemia).  High risk groups include:   , Southeast Asians, , Mediterranean, [place]an,  South and Central American and [place] descent. This is a one-time  test.     5. Hernan-Sachs (optional):  Is a disorder that results when an enzyme that            helps break down fatty substances is absent.  This is a fatal disorder.                This is most commonly passed from parents who are of Ashkenazi Roman Catholic  descent. One in four to one in five individuals of Ashkenazi Roman Catholic  descent carry a mutation for one of the autosomal recessive disorders  included in a group of disorders sometimes call \"Roman Catholic genetic  Disorders\".      **If you are a carrier of CF, SMA, or a hemoglobinopathy, your partner will also need to be tested. This partner testing is offered at a reduced cost.  This screen can be done prior to pregnancy or at any time during the pregnancy.      Maternal Serum AFP  The screening is ideally done between 15 and 18 weeks of pregnancy but can be done up to week 24. Even if you have done the Innatal testing you can still get a single AFP drawn to check for neural tube defects like Spina Bifida.       Anatomy Ultrasound:    This is a fetal anatomy survey done around 20 weeks gestation.  This ultrasound will look at the heart structure, heart activity, fetal heart rate and rhythm, assessment of the amniotic fluid volume, " placenta appearance and location, the umbilical cord and placental insertion site.  They also do many fetal measurements to make sure the baby is growing properly.  The cervical length is also measured and fetal movement is evaluated.  The gender of the baby can usually be determined.      In the event of a positive screen:    If any screening tests come back with an increased risk, you will be referred to Maternal Fetal Medicine to be seen by a genetic counselor and a doctor.  They will assess your risk and see if further diagnostic testing is warranted.  The options for diagnosis that may be offered are: chorionic villus sampling (CVS), usually done at 11 to 12 weeks of pregnancy and amniocentesis which is generally done later in pregnancy around 15 to 20 weeks.  All risks/benefits would be explained and you can decide what course of action is best for you and your family.    Why you might choose to screen?    A desire to know as much as you can about your baby    If your baby had a genetic abnormality you can learn about it before they are born    Choose whether to continue the pregnancy or to terminate    Why you might choose to NOT screen?    You feel that whatever happens is fine and you would not terminate    You know you don't want to do any diagnostic tests even if the screening test showed a high possibility of a genetic abnormality      For further information please call:  [facility]   [phone]      Fish Safety During Pregnancy    Often time's women worry about eating fish during pregnancy. Fish can be totally safe to eat during pregnancy.However, some fish may contain contaminants that could harm you or your baby if you eat certain types of fish or eat fish too often. Below is a list of which types of fish to eat, how often to eat fish, and which types of fish to avoid while pregnant.    Reasons to eat fish:    Fish is a great source of protein, vitamins, and minerals.    The oils  found in fish are important to unborn and breast fed babies    Eating fish may play a role in the prevention of heart disease in adults       Fish to EAT while pregnant   2 servings per week of any of these types of fish    Catfish (farm raised)  Cod    Crab    [place]    Flatfish   Oysters    [place]   [place] ([place], not [facility] lakes)     Sardines   Scallops    Shrimp   Tilapia   1 serving per week of any of these fish    Canned LIGHT tuna     [place] caught-        Bola Robertie   [place]    Yellow Perch   1 serving per MONTH of these types of fish    Canned WHITE tuna  [place] Sea Damon    Grouper   Halibut    [place]    [place] Roughy    Tuna steak   [place] caught-    Bass    Catfish    [place] smaller than 20 inches    [place] smaller than 30 inches         Servings of fish should be based on your weight, 1 oz for every 20 lbs of body weight. For example: 130 lb person can safely eat 7 oz     150 lb person can safely eat 8 oz     170 lb person can safely eat 9 oz      Make sure to space out meals with fish throughout the month, don't eat all your fish meals for the month within a few days.       Fish to Avoid while Pregnant:      Shark   Sam Mackerel    Swordfish  Raw Sushi-any type of fish    Tile fish         [place] Caught:      [place]e longer than 20 in    [place] longer than 30 in    Musky      Contaminants:      Mercury comes from air pollution and small amounts of mercury can damage the brain that is just starting to form or grow. Too much mercury may affect a child's behavior and lead to learning problems later in life. Too much mercury in adults and older children may cause tingling, numbness in hands and feet, or vision changes    PCBs a man-made substance once used in electrical transformers but was banned in 1967 although it can still be found in the [place]. A baby who are exposed to PCBs during pregnancy may have a lower birth weight, reduced head size,  and delayed physical development. Exposure to PCBs may also cause cancer    PFOS is a man-made chemical to make products that resist heat, oil, stains, and grease. Studies in lab animals exposed to low levels of PFOS showed decreas HDL (good cholesterol) and changes in thyroid hormone levels. The concern about PFOS is with long-term exposure such as consuming large amounts over a long period of time     Mercury and PFOS cannot be removed through cooking or cleaning. By removing fat when cleaning and cooking you can reduce PCBs.      For more information and up to date information about fish safety in [place] please contact the [place] Department of Health ([facility]) or the Department of Natural Resources (DNR):    [email or web address].  DNR: [phone]  [facility]: [phone]    Recommendations for total and rate of weight gain during pregnancy, by prepregnancy BMI    Total weight gain Rates of weight gain*  2nd and 3rd trimester   Prepregnancy BMI Range in kg Range in lbs Mean (range) in kg/week Mean (range) in lbs/week   Underweight (<18.5 kg/m2) 12.5-18 28-40 0.51 (0.44-0.58) 1 (1-1.3)   Normal weight (18.5-24.9 kg/m2) 11.5-16 25-35 0.42 (0.35-0.50) 1 (0.8-1)   Overweight (25.0-29.9 kg/m2) 7-11.5 15-25 0.28 (0.23-0.33) 0.6 (0.5-0.7)   Obese (?30.0 kg/m2) 5-9 11-20 0.22 (0.17-0.27) 0.5 (0.4-0.6)   BMI: body mass index.  * Calculations assume a 0.5-2 kg (1.1-4.4 lbs) weight gain in the first trimester.  * To calculate BMI go to [email or web address]

## 2021-04-13 ENCOUNTER — ANCILLARY PROCEDURE (OUTPATIENT)
Dept: ULTRASOUND IMAGING | Facility: CLINIC | Age: 36
End: 2021-04-13
Payer: COMMERCIAL

## 2021-04-13 ENCOUNTER — PRENATAL OFFICE VISIT (OUTPATIENT)
Dept: MIDWIFE SERVICES | Facility: CLINIC | Age: 36
End: 2021-04-13
Payer: COMMERCIAL

## 2021-04-13 VITALS
HEIGHT: 63 IN | SYSTOLIC BLOOD PRESSURE: 118 MMHG | WEIGHT: 125 LBS | DIASTOLIC BLOOD PRESSURE: 70 MMHG | BODY MASS INDEX: 22.15 KG/M2

## 2021-04-13 DIAGNOSIS — O09.291 HISTORY OF POSTPARTUM HEMORRHAGE, CURRENTLY PREGNANT IN FIRST TRIMESTER: ICD-10-CM

## 2021-04-13 DIAGNOSIS — O09.91 SUPERVISION OF HIGH RISK PREGNANCY IN FIRST TRIMESTER: Primary | ICD-10-CM

## 2021-04-13 DIAGNOSIS — O34.81 OVARIAN CYST AFFECTING PREGNANCY IN FIRST TRIMESTER, ANTEPARTUM: ICD-10-CM

## 2021-04-13 DIAGNOSIS — Z3A.09 9 WEEKS GESTATION OF PREGNANCY: ICD-10-CM

## 2021-04-13 DIAGNOSIS — O09.91 SUPERVISION OF HIGH RISK PREGNANCY IN FIRST TRIMESTER: ICD-10-CM

## 2021-04-13 DIAGNOSIS — N83.209 OVARIAN CYST AFFECTING PREGNANCY IN FIRST TRIMESTER, ANTEPARTUM: ICD-10-CM

## 2021-04-13 LAB
ALBUMIN UR-MCNC: NEGATIVE MG/DL
APPEARANCE UR: CLEAR
BILIRUB UR QL STRIP: NEGATIVE
COLOR UR AUTO: YELLOW
ERYTHROCYTE [DISTWIDTH] IN BLOOD BY AUTOMATED COUNT: 12.3 % (ref 10–15)
GLUCOSE UR STRIP-MCNC: NEGATIVE MG/DL
HCT VFR BLD AUTO: 39.5 % (ref 35–47)
HGB BLD-MCNC: 13.3 G/DL (ref 11.7–15.7)
HGB UR QL STRIP: NEGATIVE
KETONES UR STRIP-MCNC: NEGATIVE MG/DL
LEUKOCYTE ESTERASE UR QL STRIP: NEGATIVE
MCH RBC QN AUTO: 30.6 PG (ref 26.5–33)
MCHC RBC AUTO-ENTMCNC: 33.7 G/DL (ref 31.5–36.5)
MCV RBC AUTO: 91 FL (ref 78–100)
NITRATE UR QL: NEGATIVE
PH UR STRIP: 6.5 PH (ref 5–7)
PLATELET # BLD AUTO: 249 10E9/L (ref 150–450)
RBC # BLD AUTO: 4.34 10E12/L (ref 3.8–5.2)
SOURCE: NORMAL
SP GR UR STRIP: 1.01 (ref 1–1.03)
UROBILINOGEN UR STRIP-ACNC: 0.2 EU/DL (ref 0.2–1)
WBC # BLD AUTO: 8.2 10E9/L (ref 4–11)

## 2021-04-13 PROCEDURE — 87340 HEPATITIS B SURFACE AG IA: CPT | Performed by: ADVANCED PRACTICE MIDWIFE

## 2021-04-13 PROCEDURE — 86900 BLOOD TYPING SEROLOGIC ABO: CPT | Performed by: ADVANCED PRACTICE MIDWIFE

## 2021-04-13 PROCEDURE — 86762 RUBELLA ANTIBODY: CPT | Performed by: ADVANCED PRACTICE MIDWIFE

## 2021-04-13 PROCEDURE — 84443 ASSAY THYROID STIM HORMONE: CPT | Performed by: ADVANCED PRACTICE MIDWIFE

## 2021-04-13 PROCEDURE — 99207 PR PRENATAL VISIT: CPT | Performed by: ADVANCED PRACTICE MIDWIFE

## 2021-04-13 PROCEDURE — 81003 URINALYSIS AUTO W/O SCOPE: CPT | Performed by: ADVANCED PRACTICE MIDWIFE

## 2021-04-13 PROCEDURE — 76801 OB US < 14 WKS SINGLE FETUS: CPT | Performed by: OBSTETRICS & GYNECOLOGY

## 2021-04-13 PROCEDURE — 86780 TREPONEMA PALLIDUM: CPT | Mod: 90 | Performed by: ADVANCED PRACTICE MIDWIFE

## 2021-04-13 PROCEDURE — 86901 BLOOD TYPING SEROLOGIC RH(D): CPT | Performed by: ADVANCED PRACTICE MIDWIFE

## 2021-04-13 PROCEDURE — 86850 RBC ANTIBODY SCREEN: CPT | Performed by: ADVANCED PRACTICE MIDWIFE

## 2021-04-13 PROCEDURE — 84702 CHORIONIC GONADOTROPIN TEST: CPT | Performed by: ADVANCED PRACTICE MIDWIFE

## 2021-04-13 PROCEDURE — 85027 COMPLETE CBC AUTOMATED: CPT | Performed by: ADVANCED PRACTICE MIDWIFE

## 2021-04-13 PROCEDURE — 87086 URINE CULTURE/COLONY COUNT: CPT | Performed by: ADVANCED PRACTICE MIDWIFE

## 2021-04-13 PROCEDURE — 87389 HIV-1 AG W/HIV-1&-2 AB AG IA: CPT | Performed by: ADVANCED PRACTICE MIDWIFE

## 2021-04-13 PROCEDURE — 99000 SPECIMEN HANDLING OFFICE-LAB: CPT | Performed by: ADVANCED PRACTICE MIDWIFE

## 2021-04-13 PROCEDURE — 36415 COLL VENOUS BLD VENIPUNCTURE: CPT | Performed by: ADVANCED PRACTICE MIDWIFE

## 2021-04-13 ASSESSMENT — MIFFLIN-ST. JEOR: SCORE: 1232.16

## 2021-04-13 NOTE — PROGRESS NOTES
HPI:     This is a 35 year old female patient,  who presents for her first obstetrical visit.     WOLF: 11/10/2021, by Last Menstrual Period.  She is 8w2d weeks.  Her cycles are regular.  Her last menstrual period was normal.   Since her LMP, she has experienced  fatigue).   She denies nausea, emesis, abdominal pain, headache, loss of appetite, vaginal discharge, dysuria, pelvic pain, urinary urgency, lightheadedness, urinary frequency, vaginal bleeding, hemorrhoids and constipation.  Some cramping, no bleeding     Additional History:AMA, Spina Bifida,Spondylolisthesis, Ulcer of jejunum,  RH neg, PPH d/t retained placenta with manual removal, anemia     Have you travelled during the pregnancy? 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       She has a history of   at 39weeks, prolonged PROM >18hrs. PPH d/t retained placenta with manual removal     Since her last LMP she denies use of alcohol, tobacco and street drugs.     Past medical, surgical, social and family history were reviewed and updated in EPIC.               Current Outpatient Medications   Medication     folic acid 800 MCG tablet     Prenatal MV-Min-Fe Fum-FA-DHA (PRENATAL 1 PO)     acetaminophen (TYLENOL) 500 MG tablet      No current facility-administered medications for this  "visit.          ROS:   12 point review of systems negative other than symptoms noted below or in the HPI.  Constitutional: Fatigue        OBJECTIVE:      EXAM:  LMP 02/03/2021 (Approximate)  There is no height or weight on file to calculate BMI.           Nurse phone visit completed. Prenatal book and folder (containing standard educational hand-outs and brochures) will be given at the next visit to patient. Information in folder reviewed over the phone. Questions answered. Brochure given on optional screening available to assess chromosomal anomalies. Pt undecided NIPT. Pt advised to call the clinic if she has any questions or concerns related to her pregnancy. Prenatal labs future ordered. New prenatal visit scheduled on 4/13/21 with Laura Fisher CNM.              Lab Results   Component Value Date     PAP NIL 06/13/2019      PHQ-9 score:    PHQ 4/2/2021   PHQ-9 Total Score 4   Q9: Thoughts of better off dead/self-harm past 2 weeks Not at all         TED-7 SCORE 6/13/2019 8/14/2020 4/2/2021   Total Score 0 0 2         Patient supplied answers from flow sheet for:  Prenatal OB Questionnaire.  Past Medical History  Does anyone in your home smoke?: No  Have you ever ?: (!) Yes  Have you been hospitalized for a nonsurgical reason excluding normal delivery?: (!) Yes         Carine Foley RN           OBJECTIVE:     EXAM:  /70   Ht 1.594 m (5' 2.75\")   Wt 56.7 kg (125 lb)   LMP 02/03/2021 (Approximate)   Breastfeeding No   BMI 22.32 kg/m   Body mass index is 22.32 kg/m .    GENERAL: healthy, alert and no distress  NECK: no adenopathy, no asymmetry, masses, or scars and thyroid normal to palpation  RESP: lungs clear to auscultation - no rales, rhonchi or wheezes  BREAST: normal without masses, tenderness or nipple discharge and no palpable axillary masses or adenopathy  CV: regular rate and rhythm, normal S1 S2, no S3 or S4, no murmur, click or rub, no peripheral edema and peripheral pulses " strong  ABDOMEN: soft, nontender, no hepatosplenomegaly, no masses and bowel sounds normal  PSYCH: mentation appears normal, affect normal/bright    Results for orders placed or performed in visit on 21 (from the past 24 hour(s))   *UA reflex to Microscopic   Result Value Ref Range    Color Urine Yellow     Appearance Urine Clear     Glucose Urine Negative NEG^Negative mg/dL    Bilirubin Urine Negative NEG^Negative    Ketones Urine Negative NEG^Negative mg/dL    Specific Gravity Urine 1.010 1.003 - 1.035    Blood Urine Negative NEG^Negative    pH Urine 6.5 5.0 - 7.0 pH    Protein Albumin Urine Negative NEG^Negative mg/dL    Urobilinogen Urine 0.2 0.2 - 1.0 EU/dL    Nitrite Urine Negative NEG^Negative    Leukocyte Esterase Urine Negative NEG^Negative    Source Midstream Urine      ASSESSMENT/PLAN:       ICD-10-CM    1. Supervision of high risk pregnancy in first trimester  O09.91 HCG quantitative pregnancy     TSH with free T4 reflex     US OB Transvaginal Only     HCG quantitative pregnancy     US OB Transvaginal Only     *UA reflex to Microscopic     Urine Culture Aerobic Bacterial     Treponema Abs w Reflex to RPR and Titer     Rubella Antibody IgG Quantitative     HIV Antigen Antibody Combo     CBC with platelets     Hepatitis B surface antigen     ABO/Rh type and screen   2. Ovarian cyst affecting pregnancy in first trimester, antepartum  O34.81     N83.209    3. 9 weeks gestation of pregnancy  Z3A.09        35 year old , On 2021 patient is 9w6d weeks of pregnancy with WOLF of 11/10/2021, by Last Menstrual Period     consult for US for AMA patients: Undecided  Genetic Testing reviewed and discussed, Declines     COUNSELING    Instructed on use of triage nurse line and contacting the on call CNM after hours in an emergency.     Symptoms of N&V and fatigue usually start to resolve around 12-16 weeks     Reviewed CNM philosophy, call schedule for labor and delivery, and FSH for  delivery    1st OB handout given outlining appointment spacing and CNM information    Reviewed exercise and nutrition    Recommend to gain 25-35 pounds with her pregnancy.    Discussed OTC medications. OB med list given    Encouraged patient to take PNV's/DHA    Travel precautions discussed, no air travel after 36 weeks     COVID vaccine has received both doses of Phizer    Will call patient with lab results when available    Discussed results of US. Dr. Mcgill recommends an hcg level today and again in 2 days with a F/U US in a week.     Discussed ectopic/miscarriage warning s/sx and when to call.     Will return to the clinic in 1 week for US and CNM visit.  Will call to be seen sooner if problems arise.      JOAQUIN Deutsch CNM    Next visit: Ask again about MFM referral

## 2021-04-14 LAB
ABO + RH BLD: NORMAL
ABO + RH BLD: NORMAL
B-HCG SERPL-ACNC: ABNORMAL IU/L (ref 0–5)
BLD GP AB SCN SERPL QL: NORMAL
BLOOD BANK CMNT PATIENT-IMP: NORMAL
SPECIMEN EXP DATE BLD: NORMAL
T PALLIDUM AB SER QL: NONREACTIVE
TSH SERPL DL<=0.005 MIU/L-ACNC: 1.55 MU/L (ref 0.4–4)

## 2021-04-15 DIAGNOSIS — O09.91 SUPERVISION OF HIGH RISK PREGNANCY IN FIRST TRIMESTER: ICD-10-CM

## 2021-04-15 LAB
BACTERIA SPEC CULT: NO GROWTH
HBV SURFACE AG SERPL QL IA: NONREACTIVE
HIV 1+2 AB+HIV1 P24 AG SERPL QL IA: NONREACTIVE
RUBV IGG SERPL IA-ACNC: 16 IU/ML
SPECIMEN SOURCE: NORMAL

## 2021-04-15 PROCEDURE — 36415 COLL VENOUS BLD VENIPUNCTURE: CPT | Performed by: ADVANCED PRACTICE MIDWIFE

## 2021-04-15 PROCEDURE — 84702 CHORIONIC GONADOTROPIN TEST: CPT | Performed by: ADVANCED PRACTICE MIDWIFE

## 2021-04-15 NOTE — RESULT ENCOUNTER NOTE
Coco Weldon,    Your HCG level is elevated as expected. I know you have a lab appointment today for another HCG level. After that lab is complete, we be able to compare those numbers and get more information. Please contact us with any questions or concerns.     Thank you,  Jhonny Sheridan, DNP, APRN, CNM

## 2021-04-16 LAB — B-HCG SERPL-ACNC: ABNORMAL IU/L (ref 0–5)

## 2021-04-20 ENCOUNTER — PRENATAL OFFICE VISIT (OUTPATIENT)
Dept: MIDWIFE SERVICES | Facility: CLINIC | Age: 36
End: 2021-04-20
Attending: ADVANCED PRACTICE MIDWIFE
Payer: COMMERCIAL

## 2021-04-20 ENCOUNTER — ANCILLARY PROCEDURE (OUTPATIENT)
Dept: ULTRASOUND IMAGING | Facility: CLINIC | Age: 36
End: 2021-04-20
Attending: ADVANCED PRACTICE MIDWIFE
Payer: COMMERCIAL

## 2021-04-20 DIAGNOSIS — O09.91 SUPERVISION OF HIGH RISK PREGNANCY IN FIRST TRIMESTER: ICD-10-CM

## 2021-04-20 DIAGNOSIS — O09.291 HISTORY OF POSTPARTUM HEMORRHAGE, CURRENTLY PREGNANT IN FIRST TRIMESTER: ICD-10-CM

## 2021-04-20 PROCEDURE — 99207 PR PRENATAL VISIT: CPT | Performed by: ADVANCED PRACTICE MIDWIFE

## 2021-04-20 PROCEDURE — 76817 TRANSVAGINAL US OBSTETRIC: CPT | Performed by: OBSTETRICS & GYNECOLOGY

## 2021-04-20 NOTE — PROGRESS NOTES
Shiraz Boykin is a 35 year old female who is being evaluated via a billable telephone visit.      What phone number would you like to be contacted at? [phone]  How would you like to obtain your AVS? JOAQUIN Coronado CNM  [facility]    Call to Shiraz to discuss US results from this morning. MD has not reviewed images and made a report as of yet.     Shiraz states feeling much more at ease after having this US. Reports US tech discussed what she was looking for and why there was initially a concern.     Will continue to look for US report and call Shiraz later in the day. If report is not seen before end of clinic day will report off to on call SARBJIT to look later in the evening and call Shiraz with results.       Length of call 5 minutes

## 2021-05-24 ENCOUNTER — PRENATAL OFFICE VISIT (OUTPATIENT)
Dept: MIDWIFE SERVICES | Facility: CLINIC | Age: 36
End: 2021-05-24
Payer: COMMERCIAL

## 2021-05-24 VITALS — DIASTOLIC BLOOD PRESSURE: 50 MMHG | WEIGHT: 127.6 LBS | SYSTOLIC BLOOD PRESSURE: 106 MMHG | BODY MASS INDEX: 22.78 KG/M2

## 2021-05-24 DIAGNOSIS — O09.92 SUPERVISION OF HIGH RISK PREGNANCY IN SECOND TRIMESTER: Primary | ICD-10-CM

## 2021-05-24 DIAGNOSIS — O09.291 HISTORY OF POSTPARTUM HEMORRHAGE, CURRENTLY PREGNANT IN FIRST TRIMESTER: ICD-10-CM

## 2021-05-24 PROCEDURE — 99207 PR PRENATAL VISIT: CPT | Performed by: ADVANCED PRACTICE MIDWIFE

## 2021-05-24 NOTE — PROGRESS NOTES
Feels better.  Nausea less  Fetal movement No  Denies loss of fluid/vb/contractions/pelvic pain  Depression screening done  declined AFP today  GC/Chlamydia urine collection for next visit.  Has urinated too recently  Anatomy ultrasound next visit between 18-22 weeks  Return to clinic 4 weeks    Carmen Nuñez, APRN, CNM

## 2021-06-24 NOTE — PROGRESS NOTES
Feels well overall. Here with Markie  Fetal movement: positive   Denies loss of fluid/vb/contractions  Anatomy ultrasound results discussed; to be reviewed by Nano DUMONT, having a Boy, Placenta: possibly bilobed - anterior and posterior  Suboptimal views of heart. Plan to repeat ultrasound with MFM.   GCT visit between 24-28 weeks, handout provided, reminded of longer appointment  Round ligament pain and comfort measures reviewed  Return to clinic 4 weeks    Jhonny Sheridan, DNP, APRN, CNM

## 2021-06-24 NOTE — PATIENT INSTRUCTIONS
Round Ligament Pain    Pregnancy can entail many normal discomforts. One of those discomforts may be round ligament pain. Round ligament pain occurs as your uterus and your baby grow and the muscles begin to stretch more in your abdomen. Round ligament pain is normal and not dangerous but can be very uncomfortable      Round ligament pain is typically described as an unpleasant sensation that ranges from a sharp knifelike pain to dull intermittent pain in the lower abdominal/suprapubic area of a pregnant woman      Virtually all pregnant women will experience this pain at some point during their pregnancies      It typically manifests between 16 and 20 weeks of pregnancy and can be incredibly bothersome especially for women who remain very active during their pregnancies       Please discuss with your midwife if you are having this type of pain; sometimes the pain can be associated with other medical conditions so it is important for us to assess you just to make sure    Comfort measures    There are certain things you can do to cope with round ligament pain and ease the discomfort you are having      Pregnancy support belt      Tylenol      Hot/ice packs      Baths       Exercise such as yoga and swimming that help stretch muscles      Prenatal massage      Reflexology to waist and pelvic joints       Positioning such as side-lying and hands and knees, make sure your abdomen is  well supported with pillows while doing different positions    Calcium Rich Foods    All premenopausal or women of child-bearing age need to get at least 1000 mg of calcium per day from diet and/or supplementation. Post menopausal women should get at least 1200 mg from diet and/or supplementation.    Food     Amount   Calcium (mg)  Dairy  Yogurt     1 cup   400   Ice Cream/Frozen yogurt 1/2 cup  100  Milk 1% or 2%   1 cup   300  Cheese   1 oz    195-335   Cottage cheese 2%  1 cup   155  Fruits  Orange   1 medium  60  Pear    1  medium  19  Raisins    1/4 cup  18  Vegetables-fresh and/or cooked  Broccoli   1/2 cup  36  Bok [place]   1/2 cup  79  Scout greens   1/2 cup  15  Carrots    1 medium  19  Iceberg lettuce  4 leaves  16  Nuts, Beans, Seeds  Canned baked beans   1/2 cup  100  Canned red kidney beans 1/2 cup  25-80  Canned navy beans  1/2 cup  64  Tofu with calcium sulfate  1/2 cup  150  Soybeans   1 cup   175  Soy milk    1 cup   10  Almonds    1/4 cup  74  Protein  [place]   3 oz   181  Tuna, canned   3 oz   10  Turkey breast   3.5 oz   10  Egg (large)   1 egg   27  Peanut Butter   2 tbsp   11  Beef     3 oz   9  Chicken   1 leg   15  Grain  Amaranth (uncooked)  1 cup   298  Corn tortilla    1 medium  42  Rice (cooked)   1/2 cup  20  Waffle (frozen ~7in)  1   179  Bagel    1   23  Calcium fortified foods/drinks  Orange juice   1 cup   300  Cereal + milk   3/4 cup + 1/2 cup 400  Rice milk   1 cup   300  Lactaid milk    1 cup       GESTATIONAL DIABETES SCREENING    All pregnant women are screened at least once for diabetes as part of their prenatal care. A woman has gestational diabetes if she has high blood sugars for the first time during pregnancy.      Diabetes can harm your health and the health of your baby.  But if we find the diabetes early in pregnancy we will watch your health closely and prevent further problems.       We will check for gestational diabetes during your visit between 24-28 weeks visit. Please note you can not do this prior to 24 weeks of gestation.      Plan to spend about an hour at the clinic.  When you check in let us know that you will be having your diabetes screening that day.       We will give you a 50 gram glucose drink that you have 5 minutes to consume.  Exactly one hour later you will have draw blood from your arm to check your blood sugar level.      We will call you to let you know if your results are normal.  If the results are normal no more testing will be needed.  If your results are not  normal we will discuss follow up testing with you.        You may eat prior to the testing but it is not recommended to eat or drink very sweet things such as pop, juice, candy or dessert type foods.  Eat a high protein, low carb meals prior to testing.    If you have any questions please call:    [facility]    [phone]

## 2021-06-25 ENCOUNTER — PRENATAL OFFICE VISIT (OUTPATIENT)
Dept: MIDWIFE SERVICES | Facility: CLINIC | Age: 36
End: 2021-06-25
Attending: ADVANCED PRACTICE MIDWIFE
Payer: COMMERCIAL

## 2021-06-25 ENCOUNTER — ANCILLARY PROCEDURE (OUTPATIENT)
Dept: ULTRASOUND IMAGING | Facility: CLINIC | Age: 36
End: 2021-06-25
Attending: ADVANCED PRACTICE MIDWIFE
Payer: COMMERCIAL

## 2021-06-25 VITALS — DIASTOLIC BLOOD PRESSURE: 54 MMHG | SYSTOLIC BLOOD PRESSURE: 112 MMHG | BODY MASS INDEX: 23.61 KG/M2 | WEIGHT: 132.2 LBS

## 2021-06-25 DIAGNOSIS — O09.92 SUPERVISION OF HIGH RISK PREGNANCY IN SECOND TRIMESTER: Primary | ICD-10-CM

## 2021-06-25 DIAGNOSIS — O28.3 ABNORMAL FETAL ULTRASOUND: Primary | ICD-10-CM

## 2021-06-25 DIAGNOSIS — O09.92 SUPERVISION OF HIGH RISK PREGNANCY IN SECOND TRIMESTER: ICD-10-CM

## 2021-06-25 DIAGNOSIS — Z3A.20 20 WEEKS GESTATION OF PREGNANCY: ICD-10-CM

## 2021-06-25 DIAGNOSIS — M43.16 SPONDYLOLISTHESIS OF LUMBAR REGION: ICD-10-CM

## 2021-06-25 PROCEDURE — 99207 PR PRENATAL VISIT: CPT | Performed by: ADVANCED PRACTICE MIDWIFE

## 2021-06-25 PROCEDURE — 76805 OB US >/= 14 WKS SNGL FETUS: CPT | Performed by: OBSTETRICS & GYNECOLOGY

## 2021-06-25 NOTE — RESULT ENCOUNTER NOTE
Coco Weldon,    As I said before we need to repeat your ultrasound to get a better look at the heart. And as I guessed the doctor recommended that the ultrasound be done at [facility] (they have an office in our building on the 4th floor). They will call you to make an appointment. Please contact us with any questions or concerns.     Thank you,  Jhonny Sheridan, PEDRO, APRN, CNM

## 2021-06-28 ENCOUNTER — OFFICE VISIT (OUTPATIENT)
Dept: MATERNAL FETAL MEDICINE | Facility: CLINIC | Age: 36
End: 2021-06-28
Attending: ADVANCED PRACTICE MIDWIFE
Payer: COMMERCIAL

## 2021-06-28 ENCOUNTER — PRE VISIT (OUTPATIENT)
Dept: MATERNAL FETAL MEDICINE | Facility: CLINIC | Age: 36
End: 2021-06-28

## 2021-06-28 ENCOUNTER — HOSPITAL ENCOUNTER (OUTPATIENT)
Dept: ULTRASOUND IMAGING | Facility: CLINIC | Age: 36
End: 2021-06-28
Attending: ADVANCED PRACTICE MIDWIFE
Payer: COMMERCIAL

## 2021-06-28 ENCOUNTER — TRANSCRIBE ORDERS (OUTPATIENT)
Dept: MATERNAL FETAL MEDICINE | Facility: CLINIC | Age: 36
End: 2021-06-28

## 2021-06-28 DIAGNOSIS — O26.90 PREGNANCY RELATED CONDITION, ANTEPARTUM: Primary | ICD-10-CM

## 2021-06-28 DIAGNOSIS — O09.529 AMA (ADVANCED MATERNAL AGE) MULTIGRAVIDA 35+, UNSPECIFIED TRIMESTER: Primary | ICD-10-CM

## 2021-06-28 DIAGNOSIS — O26.90 PREGNANCY RELATED CONDITION, ANTEPARTUM: ICD-10-CM

## 2021-06-28 PROCEDURE — 76811 OB US DETAILED SNGL FETUS: CPT | Mod: 26 | Performed by: OBSTETRICS & GYNECOLOGY

## 2021-06-28 PROCEDURE — 76817 TRANSVAGINAL US OBSTETRIC: CPT

## 2021-06-28 NOTE — PROGRESS NOTES
Shiraz Boykin was seen for an ultrasound today at the [facility].      For the details of the ultrasound please see the report which can be found under the imaging tab.      Kusum Love MD  , OB/GYN  [facility]  [email or web address]  [phone] (Main MFM Office)  [phone] or [phone] (for 24 hour MFM questions)  [phone] (Pager)

## 2021-06-29 PROBLEM — N94.9 ADNEXAL CYST: Status: ACTIVE | Noted: 2021-04-13

## 2021-07-28 ENCOUNTER — PRENATAL OFFICE VISIT (OUTPATIENT)
Dept: MIDWIFE SERVICES | Facility: CLINIC | Age: 36
End: 2021-07-28
Payer: COMMERCIAL

## 2021-07-28 VITALS — BODY MASS INDEX: 24.68 KG/M2 | WEIGHT: 138.2 LBS | DIASTOLIC BLOOD PRESSURE: 56 MMHG | SYSTOLIC BLOOD PRESSURE: 98 MMHG

## 2021-07-28 DIAGNOSIS — Z3A.25 25 WEEKS GESTATION OF PREGNANCY: ICD-10-CM

## 2021-07-28 DIAGNOSIS — O09.92 SUPERVISION OF HIGH RISK PREGNANCY IN SECOND TRIMESTER: Primary | ICD-10-CM

## 2021-07-28 DIAGNOSIS — R19.09 INGUINAL SWELLING: ICD-10-CM

## 2021-07-28 PROCEDURE — 99207 PR PRENATAL VISIT: CPT | Performed by: ADVANCED PRACTICE MIDWIFE

## 2021-07-28 NOTE — PATIENT INSTRUCTIONS
Round Ligament Pain    Pregnancy can entail many normal discomforts. One of those discomforts may be round ligament pain. Round ligament pain occurs as your uterus and your baby grow and the muscles begin to stretch more in your abdomen. Round ligament pain is normal and not dangerous but can be very uncomfortable      Round ligament pain is typically described as an unpleasant sensation that ranges from a sharp knifelike pain to dull intermittent pain in the lower abdominal/suprapubic area of a pregnant woman      Virtually all pregnant women will experience this pain at some point during their pregnancies      It typically manifests between 16 and 20 weeks of pregnancy and can be incredibly bothersome especially for women who remain very active during their pregnancies       Please discuss with your midwife if you are having this type of pain; sometimes the pain can be associated with other medical conditions so it is important for us to assess you just to make sure    Comfort measures    There are certain things you can do to cope with round ligament pain and ease the discomfort you are having      Pregnancy support belt      Tylenol      Hot/ice packs      Baths       Exercise such as yoga and swimming that help stretch muscles      Prenatal massage      Reflexology to waist and pelvic joints       Positioning such as side-lying and hands and knees, make sure your abdomen is  well supported with pillows while doing different positions      Calcium Rich Foods    All premenopausal or women of child-bearing age need to get at least 1000 mg of calcium per day from diet and/or supplementation. Post menopausal women should get at least 1200 mg from diet and/or supplementation.    Food     Amount   Calcium (mg)  Dairy  Yogurt     1 cup   400   Ice Cream/Frozen yogurt 1/2 cup  100  Milk 1% or 2%   1 cup   300  Cheese   1 oz    195-335   Cottage cheese 2%  1 cup   155  Fruits  Orange   1 medium  60  Pear    1  medium  19  Raisins    1/4 cup  18  Vegetables-fresh and/or cooked  Broccoli   1/2 cup  36  Bok [place]   1/2 cup  79  Scout greens   1/2 cup  15  Carrots    1 medium  19  Iceberg lettuce  4 leaves  16  Nuts, Beans, Seeds  Canned baked beans   1/2 cup  100  Canned red kidney beans 1/2 cup  25-80  Canned navy beans  1/2 cup  64  Tofu with calcium sulfate  1/2 cup  150  Soybeans   1 cup   175  Soy milk    1 cup   10  Almonds    1/4 cup  74  Protein  [place]   3 oz   181  Tuna, canned   3 oz   10  Turkey breast   3.5 oz   10  Egg (large)   1 egg   27  Peanut Butter   2 tbsp   11  Beef     3 oz   9  Chicken   1 leg   15  Grain  Amaranth (uncooked)  1 cup   298  Corn tortilla    1 medium  42  Rice (cooked)   1/2 cup  20  Waffle (frozen ~7in)  1   179  Bagel    1   23  Calcium fortified foods/drinks  Orange juice   1 cup   300  Cereal + milk   3/4 cup + 1/2 cup 400  Rice milk   1 cup   300  Lactaid milk    1 cup         GESTATIONAL DIABETES SCREENING    All pregnant women are screened at least once for diabetes as part of their prenatal care. A woman has gestational diabetes if she has high blood sugars for the first time during pregnancy.      Diabetes can harm your health and the health of your baby.  But if we find the diabetes early in pregnancy we will watch your health closely and prevent further problems.       We will check for gestational diabetes during your visit between 24-28 weeks visit. Please note you can not do this prior to 24 weeks of gestation.      Plan to spend about an hour at the clinic.  When you check in let us know that you will be having your diabetes screening that day.       We will give you a 50 gram glucose drink that you have 5 minutes to consume.  Exactly one hour later you will have draw blood from your arm to check your blood sugar level.      We will call you to let you know if your results are normal.  If the results are normal no more testing will be needed.  If your results are not  normal we will discuss follow up testing with you.        You may eat prior to the testing but it is not recommended to eat or drink very sweet things such as pop, juice, candy or dessert type foods.  Eat a high protein, low carb meals prior to testing.    If you have any questions please call:    [facility]    [phone]

## 2021-07-28 NOTE — PROGRESS NOTES
Feeling well, has not had nausea or vomiting. Eating well. Denies headaches, constipation. May have had some round ligament pain.  R pain in groin. States she feels palpable mass/swelling when compared to L side.   No palpable mass on exam. Superficial vasculature is darker on R side. Will continue to monitor. Will consider US to R/O DVT is she experiences SOB, chest pain, increased pain the the site or redness. Suggested maternity leggings with mild compression as a comfort measure.   Seatbelt sits right where she feels most fetal movement, has it under her belly but worries about what would happen in an accident.  Reviewed our monitoring protocol for falls or car crashes, knows how to call in case of an accident.  Seeing chiropractor as needed.  Fetal movement: positive.  Denies loss of fluid/vb/contractions.  Water birth discussed, patient is interested in hydrotherapy but is undecided on water birth. Labored in water last time but had induction for PROM and was not able to stay in water as much as she wanted.      GCT next visit, handout provided, reminded of longer appointment.  Tdap next visit; reviewed CDC recommendations and partner/family vaccination recommended as well.  Collect Hep C next visit   Need for Rhogam? B negative. Yes, to be done next visit     Return to clinic 4 weeks    LIVE Smith     [facility]     I was present with the student who participated in the service and in the documentation of the note. I saw and evaluated the patient and agree with the findings and plan of care as documented in the note.    In addition to her prenatal visit, ~8  minutes of the appointment were spent evaluating and developing a treatment plan for her additional concern(s).        Laura NUÑEZ CNM    Next visit: Hep C  Rhogam

## 2021-07-28 NOTE — NURSING NOTE
"Chief Complaint   Patient presents with     Prenatal Care     25 weeks 0 days, no c/o VB, LoF, cramping/contractions. Feeling FM daily.     Heartburn     has tried TUMS, heartburn is usually worse at night. TUMS helps.      Muscle Pain     right side low pelvis, by pubic bone, tender to the touch, patient thinks area may be swollen. No c/o accident or trauma. Patient reports sx's started within the past week.        Initial BP 98/56   Wt 62.7 kg (138 lb 3.2 oz)   LMP 2021 (Approximate)   BMI 24.68 kg/m   Estimated body mass index is 24.68 kg/m  as calculated from the following:    Height as of 21: 1.594 m (5' 2.75\").    Weight as of this encounter: 62.7 kg (138 lb 3.2 oz).  BP completed using cuff size: regular    Questioned patient about current smoking habits.  Pt. has never smoked.          The following HM Due: NONE      Monty Gaytan CMA               "

## 2021-08-30 DIAGNOSIS — Z36.9 ENCOUNTER FOR ANTENATAL SCREENING OF MOTHER: Primary | ICD-10-CM

## 2021-08-30 DIAGNOSIS — Z29.13 NEED FOR RHOGAM DUE TO RH NEGATIVE MOTHER: ICD-10-CM

## 2021-08-30 DIAGNOSIS — Z23 NEED FOR TDAP VACCINATION: ICD-10-CM

## 2021-09-01 ENCOUNTER — PRENATAL OFFICE VISIT (OUTPATIENT)
Dept: MIDWIFE SERVICES | Facility: CLINIC | Age: 36
End: 2021-09-01
Payer: COMMERCIAL

## 2021-09-01 ENCOUNTER — LAB (OUTPATIENT)
Dept: LAB | Facility: CLINIC | Age: 36
End: 2021-09-01
Payer: COMMERCIAL

## 2021-09-01 VITALS — DIASTOLIC BLOOD PRESSURE: 56 MMHG | BODY MASS INDEX: 26.07 KG/M2 | SYSTOLIC BLOOD PRESSURE: 100 MMHG | WEIGHT: 146 LBS

## 2021-09-01 DIAGNOSIS — Z36.9 ENCOUNTER FOR ANTENATAL SCREENING OF MOTHER: ICD-10-CM

## 2021-09-01 DIAGNOSIS — O09.93 SUPERVISION OF HIGH RISK PREGNANCY IN THIRD TRIMESTER: Primary | ICD-10-CM

## 2021-09-01 DIAGNOSIS — Z23 NEED FOR TDAP VACCINATION: ICD-10-CM

## 2021-09-01 DIAGNOSIS — Z29.13 NEED FOR RHOGAM DUE TO RH NEGATIVE MOTHER: ICD-10-CM

## 2021-09-01 LAB
ANTIBODY SCREEN: NEGATIVE
ERYTHROCYTE [DISTWIDTH] IN BLOOD BY AUTOMATED COUNT: 13.4 % (ref 10–15)
GLUCOSE 1H P 50 G GLC PO SERPL-MCNC: 125 MG/DL (ref 70–129)
HCT VFR BLD AUTO: 30.9 % (ref 35–47)
HGB BLD-MCNC: 10.2 G/DL (ref 11.7–15.7)
MCH RBC QN AUTO: 32.7 PG (ref 26.5–33)
MCHC RBC AUTO-ENTMCNC: 33 G/DL (ref 31.5–36.5)
MCV RBC AUTO: 99 FL (ref 78–100)
PLATELET # BLD AUTO: 193 10E3/UL (ref 150–450)
RBC # BLD AUTO: 3.12 10E6/UL (ref 3.8–5.2)
SPECIMEN EXPIRATION DATE: NORMAL
WBC # BLD AUTO: 8.8 10E3/UL (ref 4–11)

## 2021-09-01 PROCEDURE — 85027 COMPLETE CBC AUTOMATED: CPT

## 2021-09-01 PROCEDURE — 90471 IMMUNIZATION ADMIN: CPT | Performed by: ADVANCED PRACTICE MIDWIFE

## 2021-09-01 PROCEDURE — 99207 PR PRENATAL VISIT: CPT | Performed by: ADVANCED PRACTICE MIDWIFE

## 2021-09-01 PROCEDURE — 82952 GTT-ADDED SAMPLES: CPT

## 2021-09-01 PROCEDURE — 86850 RBC ANTIBODY SCREEN: CPT

## 2021-09-01 PROCEDURE — 96372 THER/PROPH/DIAG INJ SC/IM: CPT | Performed by: ADVANCED PRACTICE MIDWIFE

## 2021-09-01 PROCEDURE — 36415 COLL VENOUS BLD VENIPUNCTURE: CPT

## 2021-09-01 PROCEDURE — 90715 TDAP VACCINE 7 YRS/> IM: CPT | Performed by: ADVANCED PRACTICE MIDWIFE

## 2021-09-01 NOTE — NURSING NOTE
Prior to immunization administration, verified patients identity using patient s name and date of birth. Please see Immunization Activity for additional information.     Screening Questionnaire for Adult Immunization    Are you sick today?   No   Do you have allergies to medications, food, a vaccine component or latex?   No   Have you ever had a serious reaction after receiving a vaccination?   No   Do you have a long-term health problem with heart, lung, kidney, or metabolic disease (e.g., diabetes), asthma, a blood disorder, no spleen, complement component deficiency, a cochlear implant, or a spinal fluid leak?  Are you on long-term aspirin therapy?   No   Do you have cancer, leukemia, HIV/AIDS, or any other immune system problem?   No   Do you have a parent, brother, or sister with an immune system problem?   No   In the past 3 months, have you taken medications that affect  your immune system, such as prednisone, other steroids, or anticancer drugs; drugs for the treatment of rheumatoid arthritis, Crohn s disease, or psoriasis; or have you had radiation treatments?   No   Have you had a seizure, or a brain or other nervous system problem?   No   During the past year, have you received a transfusion of blood or blood    products, or been given immune (gamma) globulin or antiviral drug?   No   For women: Are you pregnant or is there a chance you could become       pregnant during the next month?   yes   Have you received any vaccinations in the past 4 weeks?   No     Immunization questionnaire answers were all negative.        Per orders of Lilibeth Coreas , injection of Tdap given by Shayy Cope CMA. Patient instructed to remain in clinic for 15 minutes afterwards, and to report any adverse reaction to me immediately.    Clinic Administered Medication Documentation          Injectable Medication Documentation    Patient was given Rhogam. Prior to medication administration, verified patients identity using  patient s name and date of birth. Please see MAR and medication order for additional information. Patient instructed to report any adverse reaction to staff immediately .      Was entire vial of medication used? Yes  Vial/Syringe: Syringe  Expiration Date:  09/26/2022  Was this medication supplied by the patient? No       Screening performed by Shayy Cope CMA on 9/1/2021 at 3:21 PM.

## 2021-09-01 NOTE — PROGRESS NOTES
Shiraz Boykin presents to clinic alone at 30w0d for a routine prenatal appointment. She reports she is feeling well and has no concerns. Normal fetal movement. Denies bleeding, LOF, or regular, painful contractions. Denies headache / vision changes / RUQ pain. Worse reflux-- managing with tums.    Gap in care: missed 28 week visit d/t death in the family & trouble coordinating -- lost her maternal grandfather. Family is doing well, good support, grandma and mom are appropriately grieving. Does not anticipate trouble scheduling further visits-- childcare should not be a problem moving forward. Condolences given.    Labial swelling: reviewed probably labial varicose veins. Declines exam today. Reviewed compression tights / underwear for comfort measures.    Reviewed total weight gain of 9.526 kg (21 lb). Within range for expected total weight gain of 11.5 kg (25 lb)-16 kg (35 lb)    COVID-19 Mitigation: vaccinated. 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 Brother's family is not vaccinated-- strongly recommended caution with Delta variant-- meet outside, mask in stores.  Accepts GCT, CBC, RPR, tdap & rhogam today. Pregnancy checklist updated. Warning signs reviewed. RTC in 2 weeks, sooner if problems.

## 2021-09-06 ENCOUNTER — MYC MEDICAL ADVICE (OUTPATIENT)
Dept: MIDWIFE SERVICES | Facility: CLINIC | Age: 36
End: 2021-09-06

## 2021-09-07 NOTE — TELEPHONE ENCOUNTER
Routing pt Digabithart message to provider to advise.    Cherelle Chong RN on 9/7/2021 at 7:00 AM

## 2021-09-14 PROBLEM — Z67.91 RH NEGATIVE STATUS DURING PREGNANCY IN THIRD TRIMESTER: Status: ACTIVE | Noted: 2021-09-14

## 2021-09-14 PROBLEM — O26.893 RH NEGATIVE STATUS DURING PREGNANCY IN THIRD TRIMESTER: Status: ACTIVE | Noted: 2021-09-14

## 2021-09-14 NOTE — PATIENT INSTRUCTIONS
SIGNS OF  LABOR    Labor is  if it happens more than three weeks before your due date.    It can be hard to know if you are in labor, since the symptoms can be like the normal feelings of pregnancy.  Often, the only difference is the symptoms increase or they don't go away.     Signs of  labor can include:      Contractions which can feel like period cramps or gas pain.  You may feel it in the lower part of your abdomen, in your back, or as a pressure feeling in your bottom.  It is often regular, coming every 5 or 10 minutes, and  lasting about 30-60 seconds. Some contractions are normal during pregnancy (Toole uriarte contractions) but if you are feeling more than 5-6 in one hour that is NOT normal    If this occurs empty your bladder, then drink 2-3 glasses of water, eat a snack, and lay down on your left side. Put your hand on your abdomen to count the contractions.  If after one hour of resting you have still had 5-6 contractions call your clinic right away.      If you feel a pop, gush, or trickle of fluid it may mean that your bag of water has broken and you should contact the clinic       You may also experience loose stools and/or rectal pressure       Listen to your body, if something doesn't seem right please call us at the clinic    Risk Factors      Previous  delivery    Bacterial Vaginosis- if you notice a fishy smell to your discharge or experience vaginal itching/discomfort you should be evaluated for infection    Smoking    Drug abuse    Adolescent (teen) pregnancy or advanced maternal age (AMA) age 35 and over    Dehydration (this may not cause  labor but it can cause contractions)    If you think you are in  labor we may do some lab testing in the clinic or send you to the hospital for evaluation    Please call us if you are concerned you are in  labor.    [facility]  [phone]    PREECLAMPSIA SIGNS AND SYMPTOMS    Preeclampsia is a  "dangerous condition that some women develop in the second half of pregnancy. It can also begin after the baby is born.  Preeclampsia causes high blood pressure and can cause problems with many organ systems in your body.  It can also affect the growth of your baby. The exact cause of preeclampsia is unknown, however, there are signs and symptoms to watch for:    -A bad headache that doesn't improve with Tylenol  -Visual changes such as spots, flashes of light, blurry vision  -Pain in the upper right part of your abdomen, especially under the ribs that doesn't go away  -Nausea and/or vomiting  -Feeling extremely tired  -Yellowing of the skin and/or eyes  -Feeling \"not quite right\" or that something is wrong  -An extreme amount of swelling (some swelling in pregnancy is very normal)    If your midwife feels that you are developing preeclampsia, you will have lab tests drawn and will be monitored very closely.     If you are experiencing anyof these symptoms, call the [facility] immediately at [phone].      Fetal Kick Counts    It is important to know when your baby's movements occur. We often get busy with work and life and do not pay close attention to their movements.        Women typically begin feeling movement between 18-22 weeks of gestation, sometimes it can be earlier or later depending on where your placenta is       Movements usually begin feeling like popping or fluttering and as the baby grows they become more pronounced    Toward the end of pregnancy as the baby gets larger they may not move as much or make as big of movements. Babies have maturing sleep cycles as well as not as much room to move and flip. If you are ever concerned about your baby's movements or have not felt the baby move for a while, we recommend you do a fetal kick count. Prior to starting your count drink a glass of water or juice and eat a snack. Then lay down on your side and begin to count movements.     How to " do a Fetal Kick Counts    There are many different ways to monitor your baby's movements. Movements can range from large jabs to small kicks, or wiggles.  Hiccups count!      Count 10 movements in 2 hours when resting and focusing    Count 10 movements in 12 hours when doing normal activity    We recommend that if movements occur but seem decreased that you should be seen in the clinic or hospital for evaluation within 12 hours. If fetal movement is absent or fetal kick counts are low please contact us right away.    If you ever have any concerns about your baby's movements DO NOT HESITATE to call us, we are here for you!    [facility]  [phone]

## 2021-09-14 NOTE — PROGRESS NOTES
Feels well overall. Here with Beverley.   Fetal Movement: positive, denies loss of fluid/vb, no contractions  Fetal kick counts/movement reviewed  Reviewed PTL precautions and s/sx of preeclampsia; denies any S&S and aware of what to report    Anemia: Hgb 10.2 on 9/1. Currently taking oral iron supplementation. Plan to recheck after 10/5/21.    Left sided lower abdomen pain for 3 hours then went away. Has not returned. Discussed this can be normal in pregnancy. Reviewed s/sx of concern.   Dizzy at times when moving or standing still. Can take up to 30 minutes to feel better. Has never passed out     Peds chosen: Yes  Pediatrician: Unsure on a circumcision; advised to check insurance for hospital vs clinic    Plans to breastfeed Yes  Breastfeeding class planned Yes     Return to clinic 2 weeks    Jhonny Sheridan, DNP, APRN, CNM

## 2021-09-16 ENCOUNTER — PRENATAL OFFICE VISIT (OUTPATIENT)
Dept: MIDWIFE SERVICES | Facility: CLINIC | Age: 36
End: 2021-09-16
Payer: COMMERCIAL

## 2021-09-16 VITALS — SYSTOLIC BLOOD PRESSURE: 100 MMHG | BODY MASS INDEX: 26.43 KG/M2 | DIASTOLIC BLOOD PRESSURE: 56 MMHG | WEIGHT: 148 LBS

## 2021-09-16 DIAGNOSIS — Z67.91 RH NEGATIVE STATUS DURING PREGNANCY IN THIRD TRIMESTER: ICD-10-CM

## 2021-09-16 DIAGNOSIS — K50.90 CROHN'S DISEASE WITHOUT COMPLICATION, UNSPECIFIED GASTROINTESTINAL TRACT LOCATION (H): ICD-10-CM

## 2021-09-16 DIAGNOSIS — M43.16 SPONDYLOLISTHESIS OF LUMBAR REGION: ICD-10-CM

## 2021-09-16 DIAGNOSIS — O09.93 SUPERVISION OF HIGH RISK PREGNANCY IN THIRD TRIMESTER: Primary | ICD-10-CM

## 2021-09-16 DIAGNOSIS — Z3A.32 32 WEEKS GESTATION OF PREGNANCY: ICD-10-CM

## 2021-09-16 DIAGNOSIS — O26.893 RH NEGATIVE STATUS DURING PREGNANCY IN THIRD TRIMESTER: ICD-10-CM

## 2021-09-16 DIAGNOSIS — Q76.0 SPINA BIFIDA OCCULTA: ICD-10-CM

## 2021-09-16 PROCEDURE — 99207 PR PRENATAL VISIT: CPT | Performed by: ADVANCED PRACTICE MIDWIFE

## 2021-09-16 RX ORDER — FERROUS SULFATE 324(65)MG
TABLET, DELAYED RELEASE (ENTERIC COATED) ORAL
COMMUNITY
Start: 2021-09-08 | End: 2021-10-14

## 2021-09-16 RX ORDER — MULTIVIT-MIN/IRON/FOLIC ACID/K 18-600-40
CAPSULE ORAL
Status: ON HOLD | COMMUNITY
Start: 2021-09-08 | End: 2021-10-21

## 2021-09-16 RX ORDER — LANOLIN ALCOHOL/MO/W.PET/CERES
CREAM (GRAM) TOPICAL
Status: ON HOLD | COMMUNITY
Start: 2021-09-08 | End: 2021-11-15

## 2021-09-29 NOTE — PROGRESS NOTES
Feels tired but doing good.  States that she is having intermittent BH contractions and dizziness.  States that dizziness mostly happens while at work after being on her feet; BH contractions are intermittent and resolve with rest/hydration.   Shiraz also states that she has been having difficulty taking her iron supplement and vitamin C/B12, states it is difficult to take d/t heartburn. Would like to have labs drawn today and see if she is eligible for iron infusions.   Fetal Movement: positive, denies loss of fluid/vb, some  contractions  Fetal kick counts/movement reviewed    Reviewed PTL precautions and s/sx of preeclampsia; denies any S&S and aware of what to report  Taking hospital tour?  No, delivered 1st baby at [facility].  Is familiar with unit  Have car seat Yes  Discussed GBS/cx check at next visit  CBC and iron studies today.  Will set up venofer x 3 if eligible.  Will contact with results when available.   Return to clinic 2 weeks    Corazon NUÑEZ CNM, [facility]  [phone]

## 2021-10-01 ENCOUNTER — PRENATAL OFFICE VISIT (OUTPATIENT)
Dept: MIDWIFE SERVICES | Facility: CLINIC | Age: 36
End: 2021-10-01
Payer: COMMERCIAL

## 2021-10-01 VITALS — DIASTOLIC BLOOD PRESSURE: 60 MMHG | BODY MASS INDEX: 26.82 KG/M2 | SYSTOLIC BLOOD PRESSURE: 102 MMHG | WEIGHT: 150.2 LBS

## 2021-10-01 DIAGNOSIS — Z29.13 NEED FOR RHOGAM DUE TO RH NEGATIVE MOTHER: ICD-10-CM

## 2021-10-01 DIAGNOSIS — O09.93 SUPERVISION OF HIGH RISK PREGNANCY IN THIRD TRIMESTER: Primary | ICD-10-CM

## 2021-10-01 DIAGNOSIS — Z3A.34 34 WEEKS GESTATION OF PREGNANCY: ICD-10-CM

## 2021-10-01 DIAGNOSIS — O99.013 ANEMIA DURING PREGNANCY IN THIRD TRIMESTER: ICD-10-CM

## 2021-10-01 DIAGNOSIS — O09.293 HISTORY OF POSTPARTUM HEMORRHAGE, CURRENTLY PREGNANT IN THIRD TRIMESTER: ICD-10-CM

## 2021-10-01 DIAGNOSIS — Q76.0 SPINA BIFIDA OCCULTA: ICD-10-CM

## 2021-10-01 LAB
ERYTHROCYTE [DISTWIDTH] IN BLOOD BY AUTOMATED COUNT: 14.4 % (ref 10–15)
HCT VFR BLD AUTO: 32.9 % (ref 35–47)
HGB BLD-MCNC: 10.7 G/DL (ref 11.7–15.7)
MCH RBC QN AUTO: 32.7 PG (ref 26.5–33)
MCHC RBC AUTO-ENTMCNC: 32.5 G/DL (ref 31.5–36.5)
MCV RBC AUTO: 101 FL (ref 78–100)
PLATELET # BLD AUTO: 210 10E3/UL (ref 150–450)
RBC # BLD AUTO: 3.27 10E6/UL (ref 3.8–5.2)
WBC # BLD AUTO: 7.9 10E3/UL (ref 4–11)

## 2021-10-01 PROCEDURE — 83550 IRON BINDING TEST: CPT | Performed by: NURSE PRACTITIONER

## 2021-10-01 PROCEDURE — 36415 COLL VENOUS BLD VENIPUNCTURE: CPT | Performed by: NURSE PRACTITIONER

## 2021-10-01 PROCEDURE — 82728 ASSAY OF FERRITIN: CPT | Performed by: NURSE PRACTITIONER

## 2021-10-01 PROCEDURE — 99207 PR PRENATAL VISIT: CPT | Performed by: NURSE PRACTITIONER

## 2021-10-01 PROCEDURE — 85027 COMPLETE CBC AUTOMATED: CPT | Performed by: NURSE PRACTITIONER

## 2021-10-01 NOTE — PATIENT INSTRUCTIONS

## 2021-10-02 LAB
FERRITIN SERPL-MCNC: 8 NG/ML (ref 12–150)
IRON SATN MFR SERPL: 36 % (ref 15–46)
IRON SERPL-MCNC: 215 UG/DL (ref 35–180)
TIBC SERPL-MCNC: 601 UG/DL (ref 240–430)

## 2021-10-03 DIAGNOSIS — O99.013 ANEMIA DURING PREGNANCY IN THIRD TRIMESTER: ICD-10-CM

## 2021-10-03 DIAGNOSIS — Z20.822 ENCOUNTER FOR LABORATORY TESTING FOR COVID-19 VIRUS: Primary | ICD-10-CM

## 2021-10-03 RX ORDER — DIPHENHYDRAMINE HYDROCHLORIDE 50 MG/ML
50 INJECTION INTRAMUSCULAR; INTRAVENOUS
Status: CANCELLED
Start: 2021-10-05

## 2021-10-03 RX ORDER — HEPARIN SODIUM (PORCINE) LOCK FLUSH IV SOLN 100 UNIT/ML 100 UNIT/ML
5 SOLUTION INTRAVENOUS
Status: CANCELLED | OUTPATIENT
Start: 2021-10-05

## 2021-10-03 RX ORDER — HEPARIN SODIUM,PORCINE 10 UNIT/ML
5 VIAL (ML) INTRAVENOUS
Status: CANCELLED | OUTPATIENT
Start: 2021-10-05

## 2021-10-03 RX ORDER — ALBUTEROL SULFATE 0.83 MG/ML
2.5 SOLUTION RESPIRATORY (INHALATION)
Status: CANCELLED | OUTPATIENT
Start: 2021-10-05

## 2021-10-03 RX ORDER — ALBUTEROL SULFATE 90 UG/1
1-2 AEROSOL, METERED RESPIRATORY (INHALATION)
Status: CANCELLED
Start: 2021-10-05

## 2021-10-03 RX ORDER — NALOXONE HYDROCHLORIDE 0.4 MG/ML
0.2 INJECTION, SOLUTION INTRAMUSCULAR; INTRAVENOUS; SUBCUTANEOUS
Status: CANCELLED | OUTPATIENT
Start: 2021-10-05

## 2021-10-03 RX ORDER — METHYLPREDNISOLONE SODIUM SUCCINATE 125 MG/2ML
125 INJECTION, POWDER, LYOPHILIZED, FOR SOLUTION INTRAMUSCULAR; INTRAVENOUS
Status: CANCELLED
Start: 2021-10-05

## 2021-10-03 RX ORDER — EPINEPHRINE 1 MG/ML
0.3 INJECTION, SOLUTION, CONCENTRATE INTRAVENOUS EVERY 5 MIN PRN
Status: CANCELLED | OUTPATIENT
Start: 2021-10-05

## 2021-10-03 RX ORDER — MEPERIDINE HYDROCHLORIDE 25 MG/ML
25 INJECTION INTRAMUSCULAR; INTRAVENOUS; SUBCUTANEOUS EVERY 30 MIN PRN
Status: CANCELLED | OUTPATIENT
Start: 2021-10-05

## 2021-10-05 ENCOUNTER — LAB (OUTPATIENT)
Dept: URGENT CARE | Facility: URGENT CARE | Age: 36
End: 2021-10-05
Attending: ADVANCED PRACTICE MIDWIFE
Payer: COMMERCIAL

## 2021-10-05 DIAGNOSIS — Z20.822 ENCOUNTER FOR LABORATORY TESTING FOR COVID-19 VIRUS: ICD-10-CM

## 2021-10-05 PROCEDURE — U0005 INFEC AGEN DETEC AMPLI PROBE: HCPCS

## 2021-10-05 PROCEDURE — U0003 INFECTIOUS AGENT DETECTION BY NUCLEIC ACID (DNA OR RNA); SEVERE ACUTE RESPIRATORY SYNDROME CORONAVIRUS 2 (SARS-COV-2) (CORONAVIRUS DISEASE [COVID-19]), AMPLIFIED PROBE TECHNIQUE, MAKING USE OF HIGH THROUGHPUT TECHNOLOGIES AS DESCRIBED BY CMS-2020-01-R: HCPCS

## 2021-10-06 ENCOUNTER — TELEPHONE (OUTPATIENT)
Dept: OBGYN | Facility: CLINIC | Age: 36
End: 2021-10-06

## 2021-10-06 LAB — SARS-COV-2 RNA RESP QL NAA+PROBE: NEGATIVE

## 2021-10-06 NOTE — TELEPHONE ENCOUNTER
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 Biggest culprit for tightening/cramping/ contractions is dehydration, important that she goes home and hydrates and rests. If she is having more than 5-6 contractions/cramping in an hour after resting and hydrating then she should call us back, may need to be evaluated for PTL in MAC. Or if she is having any vaginal bleeding, lof, decreased FM she should call back as well.     We can certainly write a letter for light duty from now until delivery if she would like.     JOAQUIN Shearer, CNM

## 2021-10-06 NOTE — LETTER
October 6, 2021      Shiraz Boykin  [address] [facility] [address]        To Whom It May Concern:    Shiraz Boykin was seen in our clinic on 10/1/21 for pregnancy care. Due to late pregnancy, Shiraz's work load should be restricted to light duty starting today 10/6/21 until delivery (WOLF 11/10/21). Please contact our clinic with any questions or concerns. [phone]    Sincerely,    JOAQUIN Shearer, SARBJIT

## 2021-10-06 NOTE — TELEPHONE ENCOUNTER
35w0d    Patient calling into clinic. Today had noticed RLQ and low abdomen crampingthat persisted constantly for an hour and a half and was not comfortable but not painful. Abdomen tightening and became harder. Discussed more than likely veto uriarte.  Also some pressure in low back.  Back pain and cramping subsided some and now is having intermittently since she left work early. Currently feeling more tired than usual as well as nauseated. 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 Discussed needs time to rest and beable to drink fluids nad take breaks. If she is on feet more than can indeed have more veto uriarte.   Denies headaches.  Did see spots in periphery however this came and went. Did not persist and not seeing currently.   No vaginal bleeding or lof.   +FM  Now is feeling a little better, less pressure   Did recently talk to her nurse manager about lighter duties at work so she is not on her feet for 12hours a day and would need a letter for work to be able to be on lighter duties.     Will send info to midwives. To review and advise.     Carine Foley RN

## 2021-10-06 NOTE — TELEPHONE ENCOUNTER
Left message with recommendations per Susan Israel CNM.     Please write letter and have available for patient in mychart.     Carine Foley RN

## 2021-10-06 NOTE — TELEPHONE ENCOUNTER
Patient is about 35 weeks pregnant and asked to speak with a nurse regarding having some pelvic pain and cramping. Please return call.

## 2021-10-07 NOTE — TELEPHONE ENCOUNTER
Work letter for light duty (starting today until delivery) written and should be available in patient's MyChart. If she would like a letter for reduction in hours instead of light duty she should let us know.     JOAQUIN Shearer, CNM

## 2021-10-08 ENCOUNTER — INFUSION THERAPY VISIT (OUTPATIENT)
Dept: INFUSION THERAPY | Facility: CLINIC | Age: 36
End: 2021-10-08
Attending: ADVANCED PRACTICE MIDWIFE
Payer: COMMERCIAL

## 2021-10-08 VITALS
DIASTOLIC BLOOD PRESSURE: 71 MMHG | RESPIRATION RATE: 16 BRPM | HEART RATE: 97 BPM | TEMPERATURE: 98.5 F | SYSTOLIC BLOOD PRESSURE: 104 MMHG

## 2021-10-08 DIAGNOSIS — O99.013 ANEMIA DURING PREGNANCY IN THIRD TRIMESTER: Primary | ICD-10-CM

## 2021-10-08 PROCEDURE — 96366 THER/PROPH/DIAG IV INF ADDON: CPT

## 2021-10-08 PROCEDURE — 250N000011 HC RX IP 250 OP 636: Performed by: ADVANCED PRACTICE MIDWIFE

## 2021-10-08 PROCEDURE — 258N000003 HC RX IP 258 OP 636: Performed by: ADVANCED PRACTICE MIDWIFE

## 2021-10-08 PROCEDURE — 96365 THER/PROPH/DIAG IV INF INIT: CPT

## 2021-10-08 RX ORDER — ALBUTEROL SULFATE 90 UG/1
1-2 AEROSOL, METERED RESPIRATORY (INHALATION)
Status: CANCELLED
Start: 2021-10-10

## 2021-10-08 RX ORDER — METHYLPREDNISOLONE SODIUM SUCCINATE 125 MG/2ML
125 INJECTION, POWDER, LYOPHILIZED, FOR SOLUTION INTRAMUSCULAR; INTRAVENOUS
Status: CANCELLED
Start: 2021-10-10

## 2021-10-08 RX ORDER — MEPERIDINE HYDROCHLORIDE 25 MG/ML
25 INJECTION INTRAMUSCULAR; INTRAVENOUS; SUBCUTANEOUS EVERY 30 MIN PRN
Status: CANCELLED | OUTPATIENT
Start: 2021-10-10

## 2021-10-08 RX ORDER — EPINEPHRINE 1 MG/ML
0.3 INJECTION, SOLUTION INTRAMUSCULAR; SUBCUTANEOUS EVERY 5 MIN PRN
Status: CANCELLED | OUTPATIENT
Start: 2021-10-10

## 2021-10-08 RX ORDER — DIPHENHYDRAMINE HYDROCHLORIDE 50 MG/ML
50 INJECTION INTRAMUSCULAR; INTRAVENOUS
Status: CANCELLED
Start: 2021-10-10

## 2021-10-08 RX ORDER — NALOXONE HYDROCHLORIDE 0.4 MG/ML
0.2 INJECTION, SOLUTION INTRAMUSCULAR; INTRAVENOUS; SUBCUTANEOUS
Status: CANCELLED | OUTPATIENT
Start: 2021-10-10

## 2021-10-08 RX ORDER — HEPARIN SODIUM,PORCINE 10 UNIT/ML
5 VIAL (ML) INTRAVENOUS
Status: CANCELLED | OUTPATIENT
Start: 2021-10-10

## 2021-10-08 RX ORDER — HEPARIN SODIUM (PORCINE) LOCK FLUSH IV SOLN 100 UNIT/ML 100 UNIT/ML
5 SOLUTION INTRAVENOUS
Status: CANCELLED | OUTPATIENT
Start: 2021-10-10

## 2021-10-08 RX ORDER — ALBUTEROL SULFATE 0.83 MG/ML
2.5 SOLUTION RESPIRATORY (INHALATION)
Status: CANCELLED | OUTPATIENT
Start: 2021-10-10

## 2021-10-08 RX ADMIN — IRON SUCROSE 300 MG: 20 INJECTION, SOLUTION INTRAVENOUS at 13:36

## 2021-10-08 ASSESSMENT — PAIN SCALES - GENERAL: PAINLEVEL: NO PAIN (0)

## 2021-10-08 NOTE — PROGRESS NOTES
Infusion Nursing Note:  Shiraz Boykin presents today for venofer.    Patient seen by provider today: No   present during visit today: Not Applicable.    Note: N/A.      Intravenous Access:  Peripheral IV placed.    Treatment Conditions:  Not Applicable.      Post Infusion Assessment:  Patient tolerated infusion without incident.  Site patent and intact, free from redness, edema or discomfort.  No evidence of extravasations.  Access discontinued per protocol.       Discharge Plan:   Patient and/or family verbalized understanding of discharge instructions and all questions answered.  AVS to patient via [employer].  Patient will return 10/12 for next appointment.   Patient discharged in stable condition accompanied by: self.  Departure Mode: Ambulatory.      Kusum Phan RN

## 2021-10-10 ENCOUNTER — HEALTH MAINTENANCE LETTER (OUTPATIENT)
Age: 36
End: 2021-10-10

## 2021-10-11 NOTE — PROGRESS NOTES
Feels tired, but states that she is feeling generally OK.  Has had 2 Venofer infusions, 3rd infusion is on Monday.   Is now working light duty at hospital.   Fetal movement: positive  Denies bleeding/lof, a little contractions that resolve on own  GBS swab done today  Vaginal exam deferred, breech presentation via Leopolds and using handheld US.  Fetal head maternal LUQ  Discussed breech position and need for formal US with growth next week as well as consultation with OB to discuss ECV vs primary .  Discussed to try spinning babies maneuvers as directed on website.   Labor instructions given, instructed to call with any contractions or any suspected SROM    Warning signs reviewed  Patient denies S&S of pre-eclampsia and aware of what to report   Return to clinic 1 week, growth US with OB consult    Corazon NUÑEZ CNM, [facility]  [phone]

## 2021-10-11 NOTE — PATIENT INSTRUCTIONS
"PREECLAMPSIA SIGNS AND SYMPTOMS    Preeclampsia is a dangerous condition that some women develop in the second half of pregnancy. It can also begin after the baby is born.  Preeclampsia causes high blood pressure and can cause problems with many organ systems in your body.  It can also affect the growth of your baby. The exact cause of preeclampsia is unknown, however, there are signs and symptoms to watch for:    -A bad headache that doesn't improve with Tylenol  -Visual changes such as spots, flashes of light, blurry vision  -Pain in the upper right part of your abdomen, especially under the ribs that doesn't go away  -Nausea and/or vomiting  -Feeling extremely tired  -Yellowing of the skin and/or eyes  -Feeling \"not quite right\" or that something is wrong  -An extreme amount of swelling (some swelling in pregnancy is very normal)    If your midwife feels that you are developing preeclampsia, you will have lab tests drawn and will be monitored very closely.     If you are experiencing anyof these symptoms, call the [facility] immediately at [phone].    SIGNS OF  LABOR    Labor is  if it happens more than three weeks before your due date.    It can be hard to know if you are in labor, since the symptoms can be like the normal feelings of pregnancy.  Often, the only difference is the symptoms increase or they don't go away.     Signs of  labor can include:      Contractions which can feel like period cramps or gas pain.  You may feel it in the lower part of your abdomen, in your back, or as a pressure feeling in your bottom.  It is often regular, coming every 5 or 10 minutes, and  lasting about 30-60 seconds. Some contractions are normal during pregnancy ([place] uriarte contractions) but if you are feeling more than 5-6 in one hour that is NOT normal    If this occurs empty your bladder, then drink 2-3 glasses of water, eat a snack, and lay down on your left side. Put " your hand on your abdomen to count the contractions.  If after one hour of resting you have still had 5-6 contractions call your clinic right away.      If you feel a pop, gush, or trickle of fluid it may mean that your bag of water has broken and you should contact the clinic       You may also experience loose stools and/or rectal pressure       Listen to your body, if something doesn't seem right please call us at the clinic    Risk Factors      Previous  delivery    Bacterial Vaginosis- if you notice a fishy smell to your discharge or experience vaginal itching/discomfort you should be evaluated for infection    Smoking    Drug abuse    Adolescent (teen) pregnancy or advanced maternal age (AMA) age 35 and over    Dehydration (this may not cause  labor but it can cause contractions)    If you think you are in  labor we may do some lab testing in the clinic or send you to the hospital for evaluation    Please call us if you are concerned you are in  labor.    [facility]  [phone]    Fetal Kick Counts    It is important to know when your baby's movements occur. We often get busy with work and life and do not pay close attention to their movements.        Women typically begin feeling movement between 18-22 weeks of gestation, sometimes it can be earlier or later depending on where your placenta is       Movements usually begin feeling like popping or fluttering and as the baby grows they become more pronounced    Toward the end of pregnancy as the baby gets larger they may not move as much or make as big of movements. Babies have maturing sleep cycles as well as not as much room to move and flip. If you are ever concerned about your baby's movements or have not felt the baby move for a while, we recommend you do a fetal kick count. Prior to starting your count drink a glass of water or juice and eat a snack. Then lay down on your side and begin to count  movements.     How to do a Fetal Kick Counts    There are many different ways to monitor your baby's movements. Movements can range from large jabs to small kicks, or wiggles.  Hiccups count!      Count 10 movements in 2 hours when resting and focusing    Count 10 movements in 12 hours when doing normal activity    We recommend that if movements occur but seem decreased that you should be seen in the clinic or hospital for evaluation within 12 hours. If fetal movement is absent or fetal kick counts are low please contact us right away.    If you ever have any concerns about your baby's movements DO NOT HESITATE to call us, we are here for you!    [facility] for [facility]  [phone]      GROUP B STREP    Group B Strep (GBS) is a common bacteria that is sometimes found in the vagina, urinary tract or rectum.  It is not harmful typically to adults but can cause serious illness in newborns.  It occasionally is passed from mother to baby during birth.   It is important to test in pregnancy.  When a woman is found to be positive for GBS, either at the first prenatal visit or by taking a culture at 36 weeks, treatment will be offered to reduce the chance of spreading the bacteria to the baby.       Treatment consists of either oral antibiotics early in pregnancy or antibiotics given by IV during labor if testing is positive at 36 weeks.      Even without treatment the baby rarely (1-2% of the time) gets infected.  With treatment the baby almost never gets infected.       There really isn't anything you can do to keep from getting or being positive for GBS.  It isn't sexually transmitted and there are no symptoms if you are positive.       Your midwife will discuss your results with you and make recommendations for treatment.

## 2021-10-13 ENCOUNTER — INFUSION THERAPY VISIT (OUTPATIENT)
Dept: INFUSION THERAPY | Facility: CLINIC | Age: 36
End: 2021-10-13
Attending: ADVANCED PRACTICE MIDWIFE
Payer: COMMERCIAL

## 2021-10-13 VITALS
SYSTOLIC BLOOD PRESSURE: 105 MMHG | HEART RATE: 101 BPM | RESPIRATION RATE: 16 BRPM | TEMPERATURE: 98.1 F | DIASTOLIC BLOOD PRESSURE: 71 MMHG

## 2021-10-13 DIAGNOSIS — O99.013 ANEMIA DURING PREGNANCY IN THIRD TRIMESTER: Primary | ICD-10-CM

## 2021-10-13 PROCEDURE — 96365 THER/PROPH/DIAG IV INF INIT: CPT

## 2021-10-13 PROCEDURE — 96366 THER/PROPH/DIAG IV INF ADDON: CPT

## 2021-10-13 PROCEDURE — 250N000011 HC RX IP 250 OP 636: Performed by: ADVANCED PRACTICE MIDWIFE

## 2021-10-13 PROCEDURE — 258N000003 HC RX IP 258 OP 636: Performed by: ADVANCED PRACTICE MIDWIFE

## 2021-10-13 RX ORDER — ALBUTEROL SULFATE 0.83 MG/ML
2.5 SOLUTION RESPIRATORY (INHALATION)
Status: CANCELLED | OUTPATIENT
Start: 2021-10-14

## 2021-10-13 RX ORDER — ALBUTEROL SULFATE 90 UG/1
1-2 AEROSOL, METERED RESPIRATORY (INHALATION)
Status: CANCELLED
Start: 2021-10-14

## 2021-10-13 RX ORDER — EPINEPHRINE 1 MG/ML
0.3 INJECTION, SOLUTION INTRAMUSCULAR; SUBCUTANEOUS EVERY 5 MIN PRN
Status: CANCELLED | OUTPATIENT
Start: 2021-10-14

## 2021-10-13 RX ORDER — NALOXONE HYDROCHLORIDE 0.4 MG/ML
0.2 INJECTION, SOLUTION INTRAMUSCULAR; INTRAVENOUS; SUBCUTANEOUS
Status: CANCELLED | OUTPATIENT
Start: 2021-10-14

## 2021-10-13 RX ORDER — METHYLPREDNISOLONE SODIUM SUCCINATE 125 MG/2ML
125 INJECTION, POWDER, LYOPHILIZED, FOR SOLUTION INTRAMUSCULAR; INTRAVENOUS
Status: CANCELLED
Start: 2021-10-14

## 2021-10-13 RX ORDER — DIPHENHYDRAMINE HYDROCHLORIDE 50 MG/ML
50 INJECTION INTRAMUSCULAR; INTRAVENOUS
Status: CANCELLED
Start: 2021-10-14

## 2021-10-13 RX ORDER — HEPARIN SODIUM (PORCINE) LOCK FLUSH IV SOLN 100 UNIT/ML 100 UNIT/ML
5 SOLUTION INTRAVENOUS
Status: CANCELLED | OUTPATIENT
Start: 2021-10-14

## 2021-10-13 RX ORDER — MEPERIDINE HYDROCHLORIDE 25 MG/ML
25 INJECTION INTRAMUSCULAR; INTRAVENOUS; SUBCUTANEOUS EVERY 30 MIN PRN
Status: CANCELLED | OUTPATIENT
Start: 2021-10-14

## 2021-10-13 RX ORDER — HEPARIN SODIUM,PORCINE 10 UNIT/ML
5 VIAL (ML) INTRAVENOUS
Status: CANCELLED | OUTPATIENT
Start: 2021-10-14

## 2021-10-13 RX ADMIN — SODIUM CHLORIDE 250 ML: 9 INJECTION, SOLUTION INTRAVENOUS at 13:23

## 2021-10-13 RX ADMIN — IRON SUCROSE 300 MG: 20 INJECTION, SOLUTION INTRAVENOUS at 13:24

## 2021-10-13 ASSESSMENT — PAIN SCALES - GENERAL: PAINLEVEL: NO PAIN (0)

## 2021-10-13 NOTE — PROGRESS NOTES
Infusion Nursing Note:  Shiraz Boykin presents today for Venofer.    Patient seen by provider today: No   present during visit today: Not Applicable.    Note: pt states first dose of Venofer went well. No noted side effects.      Intravenous Access:  Peripheral IV placed.    Treatment Conditions:  Not Applicable.      Post Infusion Assessment:  Patient tolerated infusion without incident.  Site patent and intact, free from redness, edema or discomfort.  No evidence of extravasations.  Access discontinued per protocol.       Discharge Plan:   Discharge instructions reviewed with: Patient.  Patient and/or family verbalized understanding of discharge instructions and all questions answered.  Patient discharged in stable condition accompanied by: self.  Departure Mode: Ambulatory.      Briseyda Ledesma RN

## 2021-10-14 ENCOUNTER — PRENATAL OFFICE VISIT (OUTPATIENT)
Dept: MIDWIFE SERVICES | Facility: CLINIC | Age: 36
End: 2021-10-14
Payer: COMMERCIAL

## 2021-10-14 VITALS — DIASTOLIC BLOOD PRESSURE: 72 MMHG | BODY MASS INDEX: 27.21 KG/M2 | WEIGHT: 152.4 LBS | SYSTOLIC BLOOD PRESSURE: 104 MMHG

## 2021-10-14 DIAGNOSIS — Z3A.36 36 WEEKS GESTATION OF PREGNANCY: ICD-10-CM

## 2021-10-14 DIAGNOSIS — O09.293 HISTORY OF POSTPARTUM HEMORRHAGE, CURRENTLY PREGNANT IN THIRD TRIMESTER: ICD-10-CM

## 2021-10-14 DIAGNOSIS — Z67.91 RH NEGATIVE STATUS DURING PREGNANCY IN THIRD TRIMESTER: ICD-10-CM

## 2021-10-14 DIAGNOSIS — O09.93 SUPERVISION OF HIGH RISK PREGNANCY IN THIRD TRIMESTER: ICD-10-CM

## 2021-10-14 DIAGNOSIS — O99.013 ANEMIA DURING PREGNANCY IN THIRD TRIMESTER: Primary | ICD-10-CM

## 2021-10-14 DIAGNOSIS — O26.893 RH NEGATIVE STATUS DURING PREGNANCY IN THIRD TRIMESTER: ICD-10-CM

## 2021-10-14 PROCEDURE — 99207 PR PRENATAL VISIT: CPT | Performed by: NURSE PRACTITIONER

## 2021-10-14 PROCEDURE — 87653 STREP B DNA AMP PROBE: CPT | Performed by: NURSE PRACTITIONER

## 2021-10-14 NOTE — NURSING NOTE
"Chief Complaint   Patient presents with     Prenatal Care     36 weeks 1 day, no c/o VB, LoF, patient reports some cramping/contractions, but irregular. Feeling FM daily. Due for GBS.      Flu Shot     done through work on Monday 10/11     Medication Question     patient is getting iron infusions, and infusion center said to hold taking oral iron.        Initial /72   Wt 69.1 kg (152 lb 6.4 oz)   LMP 2021 (Approximate)   BMI 27.21 kg/m   Estimated body mass index is 27.21 kg/m  as calculated from the following:    Height as of 21: 1.594 m (5' 2.75\").    Weight as of this encounter: 69.1 kg (152 lb 6.4 oz).  BP completed using cuff size: regular    Questioned patient about current smoking habits.  Pt. has never smoked.          The following HM Due: NONE      Monty Gaytan CMA               "

## 2021-10-15 LAB
GP B STREP DNA SPEC QL NAA+PROBE: NEGATIVE
PATIENT PENICILLIN, AMOXICILLIN, CEPHALOSPORINS ALLERGY: NO

## 2021-10-18 ENCOUNTER — PRENATAL OFFICE VISIT (OUTPATIENT)
Dept: OBGYN | Facility: CLINIC | Age: 36
End: 2021-10-18
Payer: COMMERCIAL

## 2021-10-18 ENCOUNTER — INFUSION THERAPY VISIT (OUTPATIENT)
Dept: INFUSION THERAPY | Facility: CLINIC | Age: 36
End: 2021-10-18
Attending: ADVANCED PRACTICE MIDWIFE
Payer: COMMERCIAL

## 2021-10-18 ENCOUNTER — ANCILLARY PROCEDURE (OUTPATIENT)
Dept: ULTRASOUND IMAGING | Facility: CLINIC | Age: 36
End: 2021-10-18
Payer: COMMERCIAL

## 2021-10-18 VITALS
DIASTOLIC BLOOD PRESSURE: 74 MMHG | HEART RATE: 91 BPM | SYSTOLIC BLOOD PRESSURE: 106 MMHG | TEMPERATURE: 98.2 F | RESPIRATION RATE: 16 BRPM | OXYGEN SATURATION: 99 %

## 2021-10-18 VITALS — BODY MASS INDEX: 27.32 KG/M2 | DIASTOLIC BLOOD PRESSURE: 56 MMHG | SYSTOLIC BLOOD PRESSURE: 98 MMHG | WEIGHT: 153 LBS

## 2021-10-18 DIAGNOSIS — O09.523 SUPERVISION OF HIGH-RISK PREGNANCY OF ELDERLY MULTIGRAVIDA, THIRD TRIMESTER: Primary | ICD-10-CM

## 2021-10-18 DIAGNOSIS — O09.93 SUPERVISION OF HIGH RISK PREGNANCY IN THIRD TRIMESTER: ICD-10-CM

## 2021-10-18 DIAGNOSIS — O26.893 RH NEGATIVE STATUS DURING PREGNANCY IN THIRD TRIMESTER: ICD-10-CM

## 2021-10-18 DIAGNOSIS — O99.013 ANEMIA DURING PREGNANCY IN THIRD TRIMESTER: ICD-10-CM

## 2021-10-18 DIAGNOSIS — Z01.812 PRE-PROCEDURE LAB EXAM: ICD-10-CM

## 2021-10-18 DIAGNOSIS — O99.013 ANEMIA DURING PREGNANCY IN THIRD TRIMESTER: Primary | ICD-10-CM

## 2021-10-18 DIAGNOSIS — Z67.91 RH NEGATIVE STATUS DURING PREGNANCY IN THIRD TRIMESTER: ICD-10-CM

## 2021-10-18 LAB
ABO/RH(D): ABNORMAL
ANTIBODY ID: NORMAL
ANTIBODY SCREEN: POSITIVE
ERYTHROCYTE [DISTWIDTH] IN BLOOD BY AUTOMATED COUNT: 14.7 % (ref 10–15)
HCT VFR BLD AUTO: 35.4 % (ref 35–47)
HGB BLD-MCNC: 11.5 G/DL (ref 11.7–15.7)
MCH RBC QN AUTO: 32.9 PG (ref 26.5–33)
MCHC RBC AUTO-ENTMCNC: 32.5 G/DL (ref 31.5–36.5)
MCV RBC AUTO: 101 FL (ref 78–100)
PLATELET # BLD AUTO: 185 10E3/UL (ref 150–450)
RBC # BLD AUTO: 3.5 10E6/UL (ref 3.8–5.2)
SARS-COV-2 RNA RESP QL NAA+PROBE: NEGATIVE
SPECIMEN EXPIRATION DATE: ABNORMAL
SPECIMEN EXPIRATION DATE: NORMAL
WBC # BLD AUTO: 8.6 10E3/UL (ref 4–11)

## 2021-10-18 PROCEDURE — 86900 BLOOD TYPING SEROLOGIC ABO: CPT | Performed by: OBSTETRICS & GYNECOLOGY

## 2021-10-18 PROCEDURE — 86850 RBC ANTIBODY SCREEN: CPT | Performed by: OBSTETRICS & GYNECOLOGY

## 2021-10-18 PROCEDURE — U0003 INFECTIOUS AGENT DETECTION BY NUCLEIC ACID (DNA OR RNA); SEVERE ACUTE RESPIRATORY SYNDROME CORONAVIRUS 2 (SARS-COV-2) (CORONAVIRUS DISEASE [COVID-19]), AMPLIFIED PROBE TECHNIQUE, MAKING USE OF HIGH THROUGHPUT TECHNOLOGIES AS DESCRIBED BY CMS-2020-01-R: HCPCS | Performed by: OBSTETRICS & GYNECOLOGY

## 2021-10-18 PROCEDURE — 96365 THER/PROPH/DIAG IV INF INIT: CPT

## 2021-10-18 PROCEDURE — 85027 COMPLETE CBC AUTOMATED: CPT | Performed by: OBSTETRICS & GYNECOLOGY

## 2021-10-18 PROCEDURE — 258N000003 HC RX IP 258 OP 636: Performed by: ADVANCED PRACTICE MIDWIFE

## 2021-10-18 PROCEDURE — U0005 INFEC AGEN DETEC AMPLI PROBE: HCPCS | Performed by: OBSTETRICS & GYNECOLOGY

## 2021-10-18 PROCEDURE — 96366 THER/PROPH/DIAG IV INF ADDON: CPT

## 2021-10-18 PROCEDURE — 86870 RBC ANTIBODY IDENTIFICATION: CPT | Performed by: OBSTETRICS & GYNECOLOGY

## 2021-10-18 PROCEDURE — 86901 BLOOD TYPING SEROLOGIC RH(D): CPT | Performed by: OBSTETRICS & GYNECOLOGY

## 2021-10-18 PROCEDURE — 99207 PR PRENATAL VISIT: CPT | Performed by: OBSTETRICS & GYNECOLOGY

## 2021-10-18 PROCEDURE — 250N000011 HC RX IP 250 OP 636: Performed by: ADVANCED PRACTICE MIDWIFE

## 2021-10-18 PROCEDURE — 36415 COLL VENOUS BLD VENIPUNCTURE: CPT | Performed by: OBSTETRICS & GYNECOLOGY

## 2021-10-18 PROCEDURE — 76816 OB US FOLLOW-UP PER FETUS: CPT | Performed by: OBSTETRICS & GYNECOLOGY

## 2021-10-18 RX ORDER — DIPHENHYDRAMINE HYDROCHLORIDE 50 MG/ML
50 INJECTION INTRAMUSCULAR; INTRAVENOUS
Status: CANCELLED
Start: 2021-10-19

## 2021-10-18 RX ORDER — HEPARIN SODIUM,PORCINE 10 UNIT/ML
5 VIAL (ML) INTRAVENOUS
Status: CANCELLED | OUTPATIENT
Start: 2021-10-19

## 2021-10-18 RX ORDER — NALOXONE HYDROCHLORIDE 0.4 MG/ML
0.2 INJECTION, SOLUTION INTRAMUSCULAR; INTRAVENOUS; SUBCUTANEOUS
Status: CANCELLED | OUTPATIENT
Start: 2021-10-19

## 2021-10-18 RX ORDER — ALBUTEROL SULFATE 90 UG/1
1-2 AEROSOL, METERED RESPIRATORY (INHALATION)
Status: CANCELLED
Start: 2021-10-19

## 2021-10-18 RX ORDER — EPINEPHRINE 1 MG/ML
0.3 INJECTION, SOLUTION INTRAMUSCULAR; SUBCUTANEOUS EVERY 5 MIN PRN
Status: CANCELLED | OUTPATIENT
Start: 2021-10-19

## 2021-10-18 RX ORDER — ALBUTEROL SULFATE 0.83 MG/ML
2.5 SOLUTION RESPIRATORY (INHALATION)
Status: CANCELLED | OUTPATIENT
Start: 2021-10-19

## 2021-10-18 RX ORDER — HEPARIN SODIUM (PORCINE) LOCK FLUSH IV SOLN 100 UNIT/ML 100 UNIT/ML
5 SOLUTION INTRAVENOUS
Status: CANCELLED | OUTPATIENT
Start: 2021-10-19

## 2021-10-18 RX ORDER — METHYLPREDNISOLONE SODIUM SUCCINATE 125 MG/2ML
125 INJECTION, POWDER, LYOPHILIZED, FOR SOLUTION INTRAMUSCULAR; INTRAVENOUS
Status: CANCELLED
Start: 2021-10-19

## 2021-10-18 RX ORDER — MEPERIDINE HYDROCHLORIDE 25 MG/ML
25 INJECTION INTRAMUSCULAR; INTRAVENOUS; SUBCUTANEOUS EVERY 30 MIN PRN
Status: CANCELLED | OUTPATIENT
Start: 2021-10-19

## 2021-10-18 RX ADMIN — IRON SUCROSE 300 MG: 20 INJECTION, SOLUTION INTRAVENOUS at 13:51

## 2021-10-18 RX ADMIN — SODIUM CHLORIDE 250 ML: 9 INJECTION, SOLUTION INTRAVENOUS at 13:51

## 2021-10-18 NOTE — PROGRESS NOTES
BPD 9.38 cm 38w1d 92.2%   HC 35.32 cm 41w2d >97.7%   AC 34.43 cm 38w2d 93.9%   FL  HL 6.36 cm  5.79 cm 32w6d  33w4d <2.3%  7.1%   EFW (lbs/oz) 7 lbs               1ozs       EFW (g) 3191 g 71.5%        Fetal heart rate: 135 bpm  Fetal presentation: Breech  Amniotic fluid: 5.52cm MVP      Has had unstable lie as 2 weeks ago was cephalic. Earlier this week was noted by CNM on leopold's to be breech  Growth scan done today and overall EFW is 7-1# or 72%. The BPD and AC are 92-93% thought. MVP is normal at 5.5 the placenta is anterior and fundal.  Patient is a multip however with  previously and would like to consider ECV and as per conversation with CNM last week figured to do ECV tomorrow with me after consult. However since then her grandpa has passed and his  and reception are tomorrow so can't do it then.  Initially had some factors again successful ECV with size, ant placenta. However on leopold's there is a significant amount of mobility noted and baby is complete breech with almost 90 degrees worth of back somersault that could be done on exam w/o even any significant effort.   Discussed the factors in the pros vs cons for her and the biggest factor of success Is how much mobility there is in the fetal lie so do think she'd be a good candidate  Discussed risks of fetal intolerance and stat c/s, abruption, PROM, putting her into labor. Discussed unstable lie even after successful ECV as well as higher risk of cord complications during the  that would still indicate need for c/s  Patient understands these risks and her and her  feel that they would like to try it  Discussed flow of procedure with earlier arrival with NST and terb and then waiting to hear from Dr. Pearson about doing it for patient on 10/20. If not successful would then need a primary c/s on a Wednesday or after so may make sense to do it with them either way and patient agrees  covid swab obtained. Also CBC and T&S done  in case of needing stat c/s but also if has pos antibody screen from her 28 week rhogam then could discuss with EC/ME if would need rhogam dose after ECV or not  All patient's questions answered and will contact her with a time and date once confirmed with other providers.

## 2021-10-18 NOTE — Clinical Note
I billed this as a routine OB. However it was a consult for version and scheduling and procedure counseling.  Should this be billed as a consult for the CNMs? Just an E/M? Or leave as an ob visit?  AJ

## 2021-10-18 NOTE — PROGRESS NOTES
Infusion Nursing Note:  Shiraz SANDOVAL RiveraBoykin presents today for   Venofer 3/3.    Patient seen by provider today: No   present during visit today: Not Applicable.    Note: N/A.      Intravenous Access:  Peripheral IV placed.    Treatment Conditions:  Not Applicable.      Post Infusion Assessment:  Patient tolerated infusion without incident.  Blood return noted pre and post infusion.  Site patent and intact, free from redness, edema or discomfort.  No evidence of extravasations.  Access discontinued per protocol.       Discharge Plan:   Discharge instructions reviewed with: Patient.  Patient and/or family verbalized understanding of discharge instructions and all questions answered.  AVS to patient via [employer].     Patient discharged in stable condition accompanied by: self.  Departure Mode: Ambulatory.      Gracy Mendez RN

## 2021-10-19 NOTE — RESULT ENCOUNTER NOTE
Shiraz,    Your covid swab is negative for the version on Thursday. Sorry that I told you we'd try for Wednesday and then had to push it off one additional day. The view I was using of dr. Ocasio and Dk's schedule tomorrow wasn't showing me their surgeries. So it looked like they didn't have anything and actually had a full day.  Dr. Mcgill and Dr. Ocasio will take good care of you on Thursday morning though.    Your antibodies from your rhogam are still present. So I think if you have an easier version you could in theory skip another rhogam dose. If it's a really complicated, difficult, long one (hopefully not) then maybe it's just not worth the risk of skipping it and you should just do it. I'll let you figure that out with Dr. Mcgill on Thursday.    Good luck and I'll be praying it works!!    AJ

## 2021-10-21 ENCOUNTER — HOSPITAL ENCOUNTER (OUTPATIENT)
Facility: CLINIC | Age: 36
Discharge: HOME | End: 2021-10-21
Attending: OBSTETRICS & GYNECOLOGY | Admitting: OBSTETRICS & GYNECOLOGY
Payer: COMMERCIAL

## 2021-10-21 ENCOUNTER — HOSPITAL ENCOUNTER (OUTPATIENT)
Facility: CLINIC | Age: 36
End: 2021-10-21
Admitting: OBSTETRICS & GYNECOLOGY
Payer: COMMERCIAL

## 2021-10-21 VITALS
SYSTOLIC BLOOD PRESSURE: 110 MMHG | HEIGHT: 63 IN | RESPIRATION RATE: 18 BRPM | BODY MASS INDEX: 27.46 KG/M2 | WEIGHT: 155 LBS | DIASTOLIC BLOOD PRESSURE: 64 MMHG | HEART RATE: 83 BPM | TEMPERATURE: 98.4 F

## 2021-10-21 LAB
ABO/RH(D): ABNORMAL
ANTIBODY ID: NORMAL
ANTIBODY SCREEN: POSITIVE
ERYTHROCYTE [DISTWIDTH] IN BLOOD BY AUTOMATED COUNT: 14.6 % (ref 10–15)
HCT VFR BLD AUTO: 33.2 % (ref 35–47)
HGB BLD-MCNC: 11.1 G/DL (ref 11.7–15.7)
MCH RBC QN AUTO: 33 PG (ref 26.5–33)
MCHC RBC AUTO-ENTMCNC: 33.4 G/DL (ref 31.5–36.5)
MCV RBC AUTO: 99 FL (ref 78–100)
PLATELET # BLD AUTO: 181 10E3/UL (ref 150–450)
RBC # BLD AUTO: 3.36 10E6/UL (ref 3.8–5.2)
SPECIMEN EXPIRATION DATE: ABNORMAL
SPECIMEN EXPIRATION DATE: NORMAL
WBC # BLD AUTO: 8.2 10E3/UL (ref 4–11)

## 2021-10-21 PROCEDURE — 59025 FETAL NON-STRESS TEST: CPT

## 2021-10-21 PROCEDURE — 250N000011 HC RX IP 250 OP 636: Performed by: OBSTETRICS & GYNECOLOGY

## 2021-10-21 PROCEDURE — 86901 BLOOD TYPING SEROLOGIC RH(D): CPT | Performed by: OBSTETRICS & GYNECOLOGY

## 2021-10-21 PROCEDURE — 250N000011 HC RX IP 250 OP 636

## 2021-10-21 PROCEDURE — 59412 ANTEPARTUM MANIPULATION: CPT | Performed by: OBSTETRICS & GYNECOLOGY

## 2021-10-21 PROCEDURE — 96372 THER/PROPH/DIAG INJ SC/IM: CPT | Performed by: OBSTETRICS & GYNECOLOGY

## 2021-10-21 PROCEDURE — 59412 ANTEPARTUM MANIPULATION: CPT

## 2021-10-21 PROCEDURE — 86870 RBC ANTIBODY IDENTIFICATION: CPT | Performed by: OBSTETRICS & GYNECOLOGY

## 2021-10-21 PROCEDURE — 36415 COLL VENOUS BLD VENIPUNCTURE: CPT | Performed by: OBSTETRICS & GYNECOLOGY

## 2021-10-21 PROCEDURE — G0463 HOSPITAL OUTPT CLINIC VISIT: HCPCS | Mod: 25

## 2021-10-21 PROCEDURE — 85027 COMPLETE CBC AUTOMATED: CPT | Performed by: OBSTETRICS & GYNECOLOGY

## 2021-10-21 PROCEDURE — 59025 FETAL NON-STRESS TEST: CPT | Mod: 76

## 2021-10-21 RX ORDER — TERBUTALINE SULFATE 1 MG/ML
0.25 INJECTION, SOLUTION SUBCUTANEOUS ONCE
Status: COMPLETED | OUTPATIENT
Start: 2021-10-21 | End: 2021-10-21

## 2021-10-21 RX ORDER — TERBUTALINE SULFATE 1 MG/ML
INJECTION, SOLUTION SUBCUTANEOUS
Status: COMPLETED
Start: 2021-10-21 | End: 2021-10-21

## 2021-10-21 RX ORDER — LIDOCAINE 40 MG/G
CREAM TOPICAL
Status: DISCONTINUED | OUTPATIENT
Start: 2021-10-21 | End: 2021-10-21 | Stop reason: HOSPADM

## 2021-10-21 RX ORDER — CALCIUM CARBONATE 500 MG/1
1 TABLET, CHEWABLE ORAL CONTINUOUS PRN
Status: ON HOLD | COMMUNITY
End: 2021-11-15

## 2021-10-21 RX ADMIN — TERBUTALINE SULFATE 0.25 MG: 1 INJECTION, SOLUTION SUBCUTANEOUS at 07:29

## 2021-10-21 RX ADMIN — HUMAN RHO(D) IMMUNE GLOBULIN 300 MCG: 1500 SOLUTION INTRAMUSCULAR; INTRAVENOUS at 08:23

## 2021-10-21 RX ADMIN — TERBUTALINE SULFATE 0.25 MG: 1 INJECTION SUBCUTANEOUS at 07:29

## 2021-10-21 ASSESSMENT — MIFFLIN-ST. JEOR: SCORE: 1367.21

## 2021-10-21 NOTE — PROGRESS NOTES
BSUS position confirmation after successful ECV yesterday  Discussed possibility of induction of labor for unstable lie at 39 weeks if patient desires  Weekly visits with BSUS to confirm position  Confirmed vertex via leopolds and BSUS  Routine prenatal visit next week  Call with any concerns    JOAQUIN Powell, MERRYM

## 2021-10-21 NOTE — PLAN OF CARE
Rhogam given IM after ECV. Pt denies vaginal bleeding or loss of fluid. Abdomen soft, non tender. Baby is active. Pt denies tightening, cramping or contractions, some irregular contractions seen on monitor. All questiobns answered. Pt and spouse, Markie, agree with plan to go home. Pt discharged to home and will follow up next week in clinic per Dr Mcgill.

## 2021-10-21 NOTE — DISCHARGE INSTRUCTIONS
Discharge Instruction for Undelivered Patients      You were seen for: External cephalic version.  We Consulted: Dr Mcgill, Dr Ocasio and Emperatriz Herman CNM  You had (Test or Medicine): ECV, fetal and uterine monitoring, rhogam injection, terbutaline injection, CBC.      Diet:   As tolerated     Activity:  As tolerated     Call your provider if you notice:  Swelling in your face or increased swelling in your hands or legs.  Headaches that are not relieved by Tylenol (acetaminophen).  Changes in your vision (blurring: seeing spots or stars.)  Nausea (sick to your stomach) and vomiting (throwing up).   Weight gain of 5 pounds or more per week.  Heartburn that doesn't go away.  Signs of bladder infection: pain when you urinate (use the toilet), need to go more often and more urgently.  The bag of patel (rupture of membranes) breaks, or you notice leaking in your underwear.  Bright red blood in your underwear.  Abdominal (lower belly) or stomach pain.  Second (plus) baby: Contractions (tightening) less than 10 minutes apart and getting stronger.  Increase or change in vaginal discharge (note the color and amount)  Other: Any questions or concerns, call [facility]  [phone]  Follow-up:  OB Visit next week.

## 2021-10-21 NOTE — PROCEDURES
Shiraz NICK Lucretia  1985        Preop Dx: 1. Breech presentation    Postop Dx:  1. Cephalic presentation                     Procedure:  External cephalic version    Anesthesia: None    Surgeon:  Kylah Mcgill MD    Procedure:    Patient presenting for breech presentation.  Head was slightly to maternal left.  She was given terbutaline.  The head was easily rotated to the pelvis with a back roll.  It was achieved with minimal pressure.  Cephalic presentation confirmed with ultrasound.  She was monitored after procedure.  Rhogam studies were done.    Kylah Mcgill MD  October 21, 2021

## 2021-10-21 NOTE — PROGRESS NOTES
CNM Note    Present for support during ECV, plan for follow up for repeat US tomorrow am to confirm vertex 24 hr following ECV procedure.     JOAQUIN Powell, CNM

## 2021-10-22 ENCOUNTER — PRENATAL OFFICE VISIT (OUTPATIENT)
Dept: MIDWIFE SERVICES | Facility: CLINIC | Age: 36
End: 2021-10-22
Payer: COMMERCIAL

## 2021-10-22 VITALS — WEIGHT: 155 LBS | DIASTOLIC BLOOD PRESSURE: 60 MMHG | BODY MASS INDEX: 27.46 KG/M2 | SYSTOLIC BLOOD PRESSURE: 98 MMHG

## 2021-10-22 DIAGNOSIS — O09.523 MULTIGRAVIDA OF ADVANCED MATERNAL AGE IN THIRD TRIMESTER: ICD-10-CM

## 2021-10-22 DIAGNOSIS — Z67.91 RH NEGATIVE STATUS DURING PREGNANCY IN THIRD TRIMESTER: ICD-10-CM

## 2021-10-22 DIAGNOSIS — O26.893 RH NEGATIVE STATUS DURING PREGNANCY IN THIRD TRIMESTER: ICD-10-CM

## 2021-10-22 DIAGNOSIS — Z3A.37 37 WEEKS GESTATION OF PREGNANCY: ICD-10-CM

## 2021-10-22 DIAGNOSIS — O09.293 HISTORY OF POSTPARTUM HEMORRHAGE, CURRENTLY PREGNANT IN THIRD TRIMESTER: ICD-10-CM

## 2021-10-22 DIAGNOSIS — O09.93 SUPERVISION OF HIGH RISK PREGNANCY IN THIRD TRIMESTER: Primary | ICD-10-CM

## 2021-10-22 PROCEDURE — 99207 PR PRENATAL VISIT: CPT | Performed by: ADVANCED PRACTICE MIDWIFE

## 2021-10-25 NOTE — PROGRESS NOTES
Feels well, no concerns today.   Fetal movement: positive  Denies vaginal bleeding/lof, no contractions  Cephalic by BSUS  Warning signs reviewed   Labor signs and symptoms discussed, aware of numbers to call  Denies s/sx of pre-eclampsia and aware of what to report  Continue to check position every   Return to clinic 1 week    JOAQUIN Shearer, CNM

## 2021-10-27 ENCOUNTER — TELEPHONE (OUTPATIENT)
Dept: OBGYN | Facility: CLINIC | Age: 36
End: 2021-10-27

## 2021-10-27 ENCOUNTER — PRENATAL OFFICE VISIT (OUTPATIENT)
Dept: MIDWIFE SERVICES | Facility: CLINIC | Age: 36
End: 2021-10-27
Payer: COMMERCIAL

## 2021-10-27 VITALS — BODY MASS INDEX: 27.28 KG/M2 | SYSTOLIC BLOOD PRESSURE: 100 MMHG | DIASTOLIC BLOOD PRESSURE: 62 MMHG | WEIGHT: 154 LBS

## 2021-10-27 DIAGNOSIS — O09.93 SUPERVISION OF HIGH RISK PREGNANCY IN THIRD TRIMESTER: ICD-10-CM

## 2021-10-27 DIAGNOSIS — O99.013 ANEMIA DURING PREGNANCY IN THIRD TRIMESTER: ICD-10-CM

## 2021-10-27 DIAGNOSIS — Z67.91 RH NEGATIVE STATUS DURING PREGNANCY IN THIRD TRIMESTER: Primary | ICD-10-CM

## 2021-10-27 DIAGNOSIS — O26.893 RH NEGATIVE STATUS DURING PREGNANCY IN THIRD TRIMESTER: Primary | ICD-10-CM

## 2021-10-27 DIAGNOSIS — Z3A.38 38 WEEKS GESTATION OF PREGNANCY: ICD-10-CM

## 2021-10-27 PROCEDURE — 99207 PR PRENATAL VISIT: CPT | Performed by: ADVANCED PRACTICE MIDWIFE

## 2021-10-27 NOTE — TELEPHONE ENCOUNTER
Type of Paperwork received: FMLA    Date Rcvd:  10/27/2021    Rcvd From (Company name): [employer]    Provider:  Rosemarie    Placed on Provider Cart Date:  10/27/2021         5

## 2021-11-01 NOTE — TELEPHONE ENCOUNTER
Faxed to [facility] and [facility] 11/1/2021

## 2021-11-01 NOTE — PROGRESS NOTES
Shiraz is feeling well. Here alone today.   Concerns: Wants to ensure that baby is still cephalic. Reflux is worse; managing it will diet or TUMS  FM: +  No vaginal bleeding, LOF, contractions.  No HA, RUQ pain, N/V, visual changes.  Labor precautions discussed.  RTC 1 week.    JOAQUIN Snyder CNM

## 2021-11-02 NOTE — PATIENT INSTRUCTIONS
"Labor Instructions for Midwife Patients    When to call:  Both during and after office hours call  [phone]. There is a triage RN to take your calls and answer your questions 24 hours a day.  If she cannot answer your question she will page the midwife on call for you.    When to call:  Call anytime you have important concerns about you or your baby.     Call if:    You are having contractions at regular intervals about 5-6 minutes apart lasting 30-60 seconds and becoming increasingly more intense     You have an uncontrollable gush of fluid from your vagina or feel a pop and gush like your water has broken    You have HEAVY bleeding, like heavy period, blood running down your legs, or  soaking a pad.     Some bleeding after a pelvic exam, after intercourse, or in labor when your cervix is dilating is normal and is referred to as \"bloody show\"    You have severe, continuous back or abdominal pain    You feel it is time to go to the hospital    If this is your first labor, call when contractions are very intense and have been about every 3-4 minutes for about an hour    If it is your second labor or more, call when contractions are strong and about every 3-5 minutes or sooner depending on your level of discomfort.     Keep in mind we are always here for you! If you have questions, concerns please don't hesitate to call us.     What to eat/drink in labor: Drink plenty of fluid (water most importantly, juice, soda or tea without caffeine). Eat rice, pasta, soup, cereal, bread/toast, and fruit. Avoid dairy and greasy food as they are difficult to digest and you may experience some nausea during labor.    Comfort measures:    Baths and showers (ok even with ruptured membranes, it may temporarily slow contractions if you are still in the early stage of labor)    Warm/hot packs for back pain or discomfort    Back, belly, or thigh massages    Standing, rocking, walking, leaning over bed or tables, side-lying and " "sleeping    Miscellaneous:     Contractions are timed from the beginning of one to the beginning of the next    Try hard to sleep during the early stage of labor when you are not that uncomfortable. Timing of contractions at this point is not important    Even if you cannot sleep, resting in bed or on the couch can help you maintain your energy for labor    When you arrive at the hospital the nurse will check your baby's heartbeat, check your cervix, and will call us. The midwife on call will come in and be with you when you are in active labor    After hours you need to enter the hospital through the emergency room          PREECLAMPSIA SIGNS AND SYMPTOMS    Preeclampsia is a dangerous condition that some women develop in the second half of pregnancy. It can also begin after the baby is born.  Preeclampsia causes high blood pressure and can cause problems with many organ systems in your body.  It can also affect the growth of your baby. The exact cause of preeclampsia is unknown, however, there are signs and symptoms to watch for:    -A bad headache that doesn't improve with Tylenol  -Visual changes such as spots, flashes of light, blurry vision  -Pain in the upper right part of your abdomen, especially under the ribs that doesn't go away  -Nausea and/or vomiting  -Feeling extremely tired  -Yellowing of the skin and/or eyes  -Feeling \"not quite right\" or that something is wrong  -An extreme amount of swelling (some swelling in pregnancy is very normal)    If your midwife feels that you are developing preeclampsia, you will have lab tests drawn and will be monitored very closely.     If you are experiencing anyof these symptoms, call the [facility] immediately at [phone].              NATURAL LABOR PREPARATION    So, you're getting closer and closer to your due date and wondering what you can do to help get your body ready for labor.   Here are some natural methods you can try:    NOTE: DO NOT " begin any of the following prior to 36 completed weeks of pregnancy!    Evening Primrose Oil: Take 1000mg by mouth three times per day. This encourages the cervix to ripen. Cervical ripening is when your cervix becomes more soft and stretchy to get ready for dilation and effacement.     Red Raspberry Leaf Tea: Red raspberry tea (get it at a Co-op or in the grocery store). Drink three cups per day (hot or cold). This can help to prepare the uterine muscles for labor.              Fetal Kick Counts    It is important to know when your baby's movements occur. We often get busy with work and life and do not pay close attention to their movements.        Women typically begin feeling movement between 18-22 weeks of gestation, sometimes it can be earlier or later depending on where your placenta is       Movements usually begin feeling like popping or fluttering and as the baby grows they become more pronounced    Toward the end of pregnancy as the baby gets larger they may not move as much or make as big of movements. Babies have maturing sleep cycles as well as not as much room to move and flip. If you are ever concerned about your baby's movements or have not felt the baby move for a while, we recommend you do a fetal kick count. Prior to starting your count drink a glass of water or juice and eat a snack. Then lay down on your side and begin to count movements.     How to do a Fetal Kick Counts    There are many different ways to monitor your baby's movements. Movements can range from large jabs to small kicks, or wiggles.  Hiccups count!      Count 10 movements in 2 hours when resting and focusing    Count 10 movements in 12 hours when doing normal activity    We recommend that if movements occur but seem decreased that you should be seen in the clinic or hospital for evaluation within 12 hours. If fetal movement is absent or fetal kick counts are low please contact us right away.    If you ever have any concerns  about your baby's movements DO NOT HESITATE to call us, we are here for you!    [facility] for [facility]  [phone]

## 2021-11-03 ENCOUNTER — PRENATAL OFFICE VISIT (OUTPATIENT)
Dept: MIDWIFE SERVICES | Facility: CLINIC | Age: 36
End: 2021-11-03
Payer: COMMERCIAL

## 2021-11-03 VITALS — SYSTOLIC BLOOD PRESSURE: 102 MMHG | WEIGHT: 153.7 LBS | BODY MASS INDEX: 27.23 KG/M2 | DIASTOLIC BLOOD PRESSURE: 62 MMHG

## 2021-11-03 DIAGNOSIS — Z67.91 RH NEGATIVE STATUS DURING PREGNANCY IN THIRD TRIMESTER: ICD-10-CM

## 2021-11-03 DIAGNOSIS — O09.93 SUPERVISION OF HIGH RISK PREGNANCY IN THIRD TRIMESTER: Primary | ICD-10-CM

## 2021-11-03 DIAGNOSIS — O26.893 RH NEGATIVE STATUS DURING PREGNANCY IN THIRD TRIMESTER: ICD-10-CM

## 2021-11-03 DIAGNOSIS — O09.293 HISTORY OF POSTPARTUM HEMORRHAGE, CURRENTLY PREGNANT IN THIRD TRIMESTER: ICD-10-CM

## 2021-11-03 DIAGNOSIS — O99.013 ANEMIA DURING PREGNANCY IN THIRD TRIMESTER: ICD-10-CM

## 2021-11-03 DIAGNOSIS — Z3A.39 39 WEEKS GESTATION OF PREGNANCY: ICD-10-CM

## 2021-11-03 PROCEDURE — 99207 PR PRENATAL VISIT: CPT | Performed by: ADVANCED PRACTICE MIDWIFE

## 2021-11-08 NOTE — PROGRESS NOTES
Feels good overall, ready for baby to come. Here alone today.  Fetal movement: positive  Denies vaginal bleeding/lof, no contractions  Warning signs reviewed   Labor signs and symptoms discussed  Denies s/sx of pre-eclampsia and aware of what to report  Discussed post dates management, order for BPP between 40-41 weeks, again at 41 and 41+ weeks. She plans to schedule a BPP early next week. She will decide if/when she wants an IOL by then.   Return to clinic 1 week    Laverne Nieto, Student Nurse Midwife  [facility]    I was present with the student who participated in the service and in the documentation of the note. I saw and evaluated the patient and agree with the findings and plan of care as documented in the note.    Jhonny Sheridan, PEDRO, APRN, CNM        Jhonny Sheridan, PEDRO, APRN, CNM

## 2021-11-08 NOTE — PATIENT INSTRUCTIONS
"Labor Instructions for Midwife Patients    When to call:  Both during and after office hours call  [phone]. There is a triage RN to take your calls and answer your questions 24 hours a day.  If she cannot answer your question she will page the midwife on call for you.    When to call:  Call anytime you have important concerns about you or your baby.     Call if:    You are having contractions at regular intervals about 5-6 minutes apart lasting 30-60 seconds and becoming increasingly more intense     You have an uncontrollable gush of fluid from your vagina or feel a pop and gush like your water has broken    You have HEAVY bleeding, like heavy period, blood running down your legs, or  soaking a pad.     Some bleeding after a pelvic exam, after intercourse, or in labor when your cervix is dilating is normal and is referred to as \"bloody show\"    You have severe, continuous back or abdominal pain    You feel it is time to go to the hospital    If this is your first labor, call when contractions are very intense and have been about every 3-4 minutes for about an hour    If it is your second labor or more, call when contractions are strong and about every 3-5 minutes or sooner depending on your level of discomfort.     Keep in mind we are always here for you! If you have questions, concerns please don't hesitate to call us.     What to eat/drink in labor: Drink plenty of fluid (water most importantly, juice, soda or tea without caffeine). Eat rice, pasta, soup, cereal, bread/toast, and fruit. Avoid dairy and greasy food as they are difficult to digest and you may experience some nausea during labor.    Comfort measures:    Baths and showers (ok even with ruptured membranes, it may temporarily slow contractions if you are still in the early stage of labor)    Warm/hot packs for back pain or discomfort    Back, belly, or thigh massages    Standing, rocking, walking, leaning over bed or tables, side-lying and " "sleeping    Miscellaneous:     Contractions are timed from the beginning of one to the beginning of the next    Try hard to sleep during the early stage of labor when you are not that uncomfortable. Timing of contractions at this point is not important    Even if you cannot sleep, resting in bed or on the couch can help you maintain your energy for labor    When you arrive at the hospital the nurse will check your baby's heartbeat, check your cervix, and will call us. The midwife on call will come in and be with you when you are in active labor    After hours you need to enter the hospital through the emergency room          PREECLAMPSIA SIGNS AND SYMPTOMS    Preeclampsia is a dangerous condition that some women develop in the second half of pregnancy. It can also begin after the baby is born.  Preeclampsia causes high blood pressure and can cause problems with many organ systems in your body.  It can also affect the growth of your baby. The exact cause of preeclampsia is unknown, however, there are signs and symptoms to watch for:    -A bad headache that doesn't improve with Tylenol  -Visual changes such as spots, flashes of light, blurry vision  -Pain in the upper right part of your abdomen, especially under the ribs that doesn't go away  -Nausea and/or vomiting  -Feeling extremely tired  -Yellowing of the skin and/or eyes  -Feeling \"not quite right\" or that something is wrong  -An extreme amount of swelling (some swelling in pregnancy is very normal)    If your midwife feels that you are developing preeclampsia, you will have lab tests drawn and will be monitored very closely.     If you are experiencing anyof these symptoms, call the [facility] immediately at [phone].              NATURAL LABOR PREPARATION    So, you're getting closer and closer to your due date and wondering what you can do to help get your body ready for labor.   Here are some natural methods you can try:    NOTE: DO NOT " begin any of the following prior to 36 completed weeks of pregnancy!    Evening Primrose Oil: Take 1000mg by mouth three times per day. This encourages the cervix to ripen. Cervical ripening is when your cervix becomes more soft and stretchy to get ready for dilation and effacement.     Red Raspberry Leaf Tea: Red raspberry tea (get it at a Co-op or in the grocery store). Drink three cups per day (hot or cold). This can help to prepare the uterine muscles for labor.              Fetal Kick Counts    It is important to know when your baby's movements occur. We often get busy with work and life and do not pay close attention to their movements.        Women typically begin feeling movement between 18-22 weeks of gestation, sometimes it can be earlier or later depending on where your placenta is       Movements usually begin feeling like popping or fluttering and as the baby grows they become more pronounced    Toward the end of pregnancy as the baby gets larger they may not move as much or make as big of movements. Babies have maturing sleep cycles as well as not as much room to move and flip. If you are ever concerned about your baby's movements or have not felt the baby move for a while, we recommend you do a fetal kick count. Prior to starting your count drink a glass of water or juice and eat a snack. Then lay down on your side and begin to count movements.     How to do a Fetal Kick Counts    There are many different ways to monitor your baby's movements. Movements can range from large jabs to small kicks, or wiggles.  Hiccups count!      Count 10 movements in 2 hours when resting and focusing    Count 10 movements in 12 hours when doing normal activity    We recommend that if movements occur but seem decreased that you should be seen in the clinic or hospital for evaluation within 12 hours. If fetal movement is absent or fetal kick counts are low please contact us right away.    If you ever have any concerns  about your baby's movements DO NOT HESITATE to call us, we are here for you!    [facility] for [facility]  [phone]        .

## 2021-11-09 ENCOUNTER — PRENATAL OFFICE VISIT (OUTPATIENT)
Dept: MIDWIFE SERVICES | Facility: CLINIC | Age: 36
End: 2021-11-09
Payer: COMMERCIAL

## 2021-11-09 VITALS — DIASTOLIC BLOOD PRESSURE: 58 MMHG | BODY MASS INDEX: 27.1 KG/M2 | WEIGHT: 153 LBS | SYSTOLIC BLOOD PRESSURE: 102 MMHG

## 2021-11-09 DIAGNOSIS — O09.293 HISTORY OF POSTPARTUM HEMORRHAGE, CURRENTLY PREGNANT IN THIRD TRIMESTER: ICD-10-CM

## 2021-11-09 DIAGNOSIS — Z3A.39 39 WEEKS GESTATION OF PREGNANCY: ICD-10-CM

## 2021-11-09 DIAGNOSIS — O99.013 ANEMIA DURING PREGNANCY IN THIRD TRIMESTER: ICD-10-CM

## 2021-11-09 DIAGNOSIS — Z67.91 RH NEGATIVE STATUS DURING PREGNANCY IN THIRD TRIMESTER: ICD-10-CM

## 2021-11-09 DIAGNOSIS — O26.893 RH NEGATIVE STATUS DURING PREGNANCY IN THIRD TRIMESTER: ICD-10-CM

## 2021-11-09 DIAGNOSIS — O48.0 POST-TERM PREGNANCY, 40-42 WEEKS OF GESTATION: ICD-10-CM

## 2021-11-09 DIAGNOSIS — O09.93 SUPERVISION OF HIGH RISK PREGNANCY IN THIRD TRIMESTER: Primary | ICD-10-CM

## 2021-11-09 PROCEDURE — 99207 PR PRENATAL VISIT: CPT | Performed by: ADVANCED PRACTICE MIDWIFE

## 2021-11-11 ENCOUNTER — TELEPHONE (OUTPATIENT)
Dept: MIDWIFE SERVICES | Facility: CLINIC | Age: 36
End: 2021-11-11
Payer: COMMERCIAL

## 2021-11-11 NOTE — TELEPHONE ENCOUNTER
Type of Paperwork received:  std     Date Rcvd:  11/11/2021    Rcvd From (Company name): [employer]    Provider:  Midwives    Placed on Provider Cart Date:  11/11/2021

## 2021-11-11 NOTE — TELEPHONE ENCOUNTER
Called Shiraz.    Tightening consistently. Feeling irregular, random low abdomen cramping. Nothing is painful. Denies LOF. FM+.    Discussed s/sx of labor. She will call if sx increase or if she has questions.     Jhonny Sheridan, DNP, APRN, CNM

## 2021-11-11 NOTE — TELEPHONE ENCOUNTER
2nd pregnancy  GBS neg    Intermittent contractions since last night  Now unsure how count contractions, feels tight more often than not.    No vaginal bleeding or LOF  Active baby    Discussed s/s of active labor. No cervical exam this pregnancy.  Offered MAC for r/o labor pt wanting to stay home for now. Discussed comfort measures at home.     Will consult with midwives for recommendations as well.  Jessy Gregory RN on 11/11/2021 at 2:18 PM

## 2021-11-13 ENCOUNTER — TELEPHONE (OUTPATIENT)
Dept: OBGYN | Facility: CLINIC | Age: 36
End: 2021-11-13
Payer: COMMERCIAL

## 2021-11-13 ENCOUNTER — HOSPITAL ENCOUNTER (INPATIENT)
Facility: CLINIC | Age: 36
LOS: 2 days | Discharge: HOME | End: 2021-11-15
Attending: NURSE PRACTITIONER | Admitting: NURSE PRACTITIONER
Payer: COMMERCIAL

## 2021-11-13 ENCOUNTER — NURSE TRIAGE (OUTPATIENT)
Dept: NURSING | Facility: CLINIC | Age: 36
End: 2021-11-13
Payer: COMMERCIAL

## 2021-11-13 ENCOUNTER — ANESTHESIA EVENT (OUTPATIENT)
Dept: OBGYN | Facility: CLINIC | Age: 36
End: 2021-11-13
Payer: COMMERCIAL

## 2021-11-13 ENCOUNTER — ANESTHESIA (OUTPATIENT)
Dept: OBGYN | Facility: CLINIC | Age: 36
End: 2021-11-13
Payer: COMMERCIAL

## 2021-11-13 LAB
ABO/RH(D): ABNORMAL
ANTIBODY SCREEN: POSITIVE
SARS-COV-2 RNA RESP QL NAA+PROBE: NEGATIVE
SPECIMEN EXPIRATION DATE: ABNORMAL

## 2021-11-13 PROCEDURE — 258N000003 HC RX IP 258 OP 636: Performed by: NURSE PRACTITIONER

## 2021-11-13 PROCEDURE — 250N000011 HC RX IP 250 OP 636: Performed by: ANESTHESIOLOGY

## 2021-11-13 PROCEDURE — 722N000001 HC LABOR CARE VAGINAL DELIVERY SINGLE

## 2021-11-13 PROCEDURE — 86870 RBC ANTIBODY IDENTIFICATION: CPT | Performed by: NURSE PRACTITIONER

## 2021-11-13 PROCEDURE — 00HU33Z INSERTION OF INFUSION DEVICE INTO SPINAL CANAL, PERCUTANEOUS APPROACH: ICD-10-PCS | Performed by: ANESTHESIOLOGY

## 2021-11-13 PROCEDURE — 86900 BLOOD TYPING SEROLOGIC ABO: CPT | Performed by: NURSE PRACTITIONER

## 2021-11-13 PROCEDURE — 85025 COMPLETE CBC W/AUTO DIFF WBC: CPT | Performed by: NURSE PRACTITIONER

## 2021-11-13 PROCEDURE — 87635 SARS-COV-2 COVID-19 AMP PRB: CPT | Performed by: NURSE PRACTITIONER

## 2021-11-13 PROCEDURE — 86780 TREPONEMA PALLIDUM: CPT | Performed by: NURSE PRACTITIONER

## 2021-11-13 PROCEDURE — 370N000003 HC ANESTHESIA WARD SERVICE

## 2021-11-13 PROCEDURE — 250N000009 HC RX 250: Performed by: NURSE PRACTITIONER

## 2021-11-13 PROCEDURE — C9803 HOPD COVID-19 SPEC COLLECT: HCPCS

## 2021-11-13 PROCEDURE — G0463 HOSPITAL OUTPT CLINIC VISIT: HCPCS | Mod: 25

## 2021-11-13 PROCEDURE — 3E0R3BZ INTRODUCTION OF ANESTHETIC AGENT INTO SPINAL CANAL, PERCUTANEOUS APPROACH: ICD-10-PCS | Performed by: ANESTHESIOLOGY

## 2021-11-13 PROCEDURE — 258N000003 HC RX IP 258 OP 636: Performed by: ANESTHESIOLOGY

## 2021-11-13 PROCEDURE — 59025 FETAL NON-STRESS TEST: CPT

## 2021-11-13 PROCEDURE — 120N000001 HC R&B MED SURG/OB

## 2021-11-13 RX ORDER — SODIUM CHLORIDE, SODIUM LACTATE, POTASSIUM CHLORIDE, CALCIUM CHLORIDE 600; 310; 30; 20 MG/100ML; MG/100ML; MG/100ML; MG/100ML
INJECTION, SOLUTION INTRAVENOUS CONTINUOUS
Status: DISCONTINUED | OUTPATIENT
Start: 2021-11-13 | End: 2021-11-14 | Stop reason: HOSPADM

## 2021-11-13 RX ORDER — ONDANSETRON 4 MG/1
4 TABLET, ORALLY DISINTEGRATING ORAL EVERY 6 HOURS PRN
Status: DISCONTINUED | OUTPATIENT
Start: 2021-11-13 | End: 2021-11-13 | Stop reason: HOSPADM

## 2021-11-13 RX ORDER — METOCLOPRAMIDE 10 MG/1
10 TABLET ORAL EVERY 6 HOURS PRN
Status: DISCONTINUED | OUTPATIENT
Start: 2021-11-13 | End: 2021-11-14 | Stop reason: HOSPADM

## 2021-11-13 RX ORDER — ONDANSETRON 2 MG/ML
4 INJECTION INTRAMUSCULAR; INTRAVENOUS EVERY 6 HOURS PRN
Status: DISCONTINUED | OUTPATIENT
Start: 2021-11-13 | End: 2021-11-14 | Stop reason: HOSPADM

## 2021-11-13 RX ORDER — PROCHLORPERAZINE 25 MG
25 SUPPOSITORY, RECTAL RECTAL EVERY 12 HOURS PRN
Status: DISCONTINUED | OUTPATIENT
Start: 2021-11-13 | End: 2021-11-14 | Stop reason: HOSPADM

## 2021-11-13 RX ORDER — ONDANSETRON 4 MG/1
4 TABLET, ORALLY DISINTEGRATING ORAL EVERY 6 HOURS PRN
Status: DISCONTINUED | OUTPATIENT
Start: 2021-11-13 | End: 2021-11-14 | Stop reason: HOSPADM

## 2021-11-13 RX ORDER — NALBUPHINE HYDROCHLORIDE 10 MG/ML
2.5-5 INJECTION, SOLUTION INTRAMUSCULAR; INTRAVENOUS; SUBCUTANEOUS EVERY 6 HOURS PRN
Status: DISCONTINUED | OUTPATIENT
Start: 2021-11-13 | End: 2021-11-15 | Stop reason: HOSPADM

## 2021-11-13 RX ORDER — NALOXONE HYDROCHLORIDE 0.4 MG/ML
0.4 INJECTION, SOLUTION INTRAMUSCULAR; INTRAVENOUS; SUBCUTANEOUS
Status: DISCONTINUED | OUTPATIENT
Start: 2021-11-13 | End: 2021-11-14 | Stop reason: HOSPADM

## 2021-11-13 RX ORDER — KETOROLAC TROMETHAMINE 30 MG/ML
30 INJECTION, SOLUTION INTRAMUSCULAR; INTRAVENOUS
Status: DISCONTINUED | OUTPATIENT
Start: 2021-11-13 | End: 2021-11-14

## 2021-11-13 RX ORDER — METOCLOPRAMIDE HYDROCHLORIDE 5 MG/ML
10 INJECTION INTRAMUSCULAR; INTRAVENOUS EVERY 6 HOURS PRN
Status: DISCONTINUED | OUTPATIENT
Start: 2021-11-13 | End: 2021-11-13 | Stop reason: HOSPADM

## 2021-11-13 RX ORDER — OXYTOCIN 10 [USP'U]/ML
10 INJECTION, SOLUTION INTRAMUSCULAR; INTRAVENOUS
Status: DISCONTINUED | OUTPATIENT
Start: 2021-11-13 | End: 2021-11-14 | Stop reason: HOSPADM

## 2021-11-13 RX ORDER — ROPIVACAINE HYDROCHLORIDE 2 MG/ML
INJECTION, SOLUTION EPIDURAL; INFILTRATION; PERINEURAL
Status: COMPLETED | OUTPATIENT
Start: 2021-11-13 | End: 2021-11-13

## 2021-11-13 RX ORDER — OXYTOCIN/0.9 % SODIUM CHLORIDE 30/500 ML
340 PLASTIC BAG, INJECTION (ML) INTRAVENOUS CONTINUOUS PRN
Status: COMPLETED | OUTPATIENT
Start: 2021-11-13 | End: 2021-11-13

## 2021-11-13 RX ORDER — PROCHLORPERAZINE MALEATE 5 MG
10 TABLET ORAL EVERY 6 HOURS PRN
Status: DISCONTINUED | OUTPATIENT
Start: 2021-11-13 | End: 2021-11-13 | Stop reason: HOSPADM

## 2021-11-13 RX ORDER — NALOXONE HYDROCHLORIDE 0.4 MG/ML
0.2 INJECTION, SOLUTION INTRAMUSCULAR; INTRAVENOUS; SUBCUTANEOUS
Status: DISCONTINUED | OUTPATIENT
Start: 2021-11-13 | End: 2021-11-14 | Stop reason: HOSPADM

## 2021-11-13 RX ORDER — METHYLERGONOVINE MALEATE 0.2 MG/ML
200 INJECTION INTRAVENOUS
Status: DISCONTINUED | OUTPATIENT
Start: 2021-11-13 | End: 2021-11-14 | Stop reason: HOSPADM

## 2021-11-13 RX ORDER — METOCLOPRAMIDE HYDROCHLORIDE 5 MG/ML
10 INJECTION INTRAMUSCULAR; INTRAVENOUS EVERY 6 HOURS PRN
Status: DISCONTINUED | OUTPATIENT
Start: 2021-11-13 | End: 2021-11-14 | Stop reason: HOSPADM

## 2021-11-13 RX ORDER — ROPIVACAINE HYDROCHLORIDE 2 MG/ML
10 INJECTION, SOLUTION EPIDURAL; INFILTRATION; PERINEURAL ONCE
Status: DISCONTINUED | OUTPATIENT
Start: 2021-11-13 | End: 2021-11-14 | Stop reason: HOSPADM

## 2021-11-13 RX ORDER — PROCHLORPERAZINE MALEATE 5 MG
10 TABLET ORAL EVERY 6 HOURS PRN
Status: DISCONTINUED | OUTPATIENT
Start: 2021-11-13 | End: 2021-11-14 | Stop reason: HOSPADM

## 2021-11-13 RX ORDER — FENTANYL CITRATE 50 UG/ML
100 INJECTION, SOLUTION INTRAMUSCULAR; INTRAVENOUS ONCE
Status: COMPLETED | OUTPATIENT
Start: 2021-11-13 | End: 2021-11-14

## 2021-11-13 RX ORDER — METOCLOPRAMIDE 10 MG/1
10 TABLET ORAL EVERY 6 HOURS PRN
Status: DISCONTINUED | OUTPATIENT
Start: 2021-11-13 | End: 2021-11-13 | Stop reason: HOSPADM

## 2021-11-13 RX ORDER — MISOPROSTOL 200 UG/1
800 TABLET ORAL
Status: DISCONTINUED | OUTPATIENT
Start: 2021-11-13 | End: 2021-11-14 | Stop reason: HOSPADM

## 2021-11-13 RX ORDER — MISOPROSTOL 200 UG/1
400 TABLET ORAL
Status: DISCONTINUED | OUTPATIENT
Start: 2021-11-13 | End: 2021-11-14 | Stop reason: HOSPADM

## 2021-11-13 RX ORDER — PROCHLORPERAZINE 25 MG
25 SUPPOSITORY, RECTAL RECTAL EVERY 12 HOURS PRN
Status: DISCONTINUED | OUTPATIENT
Start: 2021-11-13 | End: 2021-11-13 | Stop reason: HOSPADM

## 2021-11-13 RX ORDER — CARBOPROST TROMETHAMINE 250 UG/ML
250 INJECTION, SOLUTION INTRAMUSCULAR
Status: DISCONTINUED | OUTPATIENT
Start: 2021-11-13 | End: 2021-11-14 | Stop reason: HOSPADM

## 2021-11-13 RX ORDER — IBUPROFEN 600 MG/1
600 TABLET, FILM COATED ORAL
Status: DISCONTINUED | OUTPATIENT
Start: 2021-11-13 | End: 2021-11-14

## 2021-11-13 RX ORDER — TRANEXAMIC ACID 10 MG/ML
1 INJECTION, SOLUTION INTRAVENOUS EVERY 30 MIN PRN
Status: DISCONTINUED | OUTPATIENT
Start: 2021-11-13 | End: 2021-11-14 | Stop reason: HOSPADM

## 2021-11-13 RX ORDER — OXYTOCIN 10 [USP'U]/ML
10 INJECTION, SOLUTION INTRAMUSCULAR; INTRAVENOUS
Status: DISCONTINUED | OUTPATIENT
Start: 2021-11-13 | End: 2021-11-14

## 2021-11-13 RX ORDER — EPHEDRINE SULFATE 50 MG/ML
5 INJECTION, SOLUTION INTRAMUSCULAR; INTRAVENOUS; SUBCUTANEOUS
Status: DISCONTINUED | OUTPATIENT
Start: 2021-11-13 | End: 2021-11-14 | Stop reason: HOSPADM

## 2021-11-13 RX ORDER — LIDOCAINE 40 MG/G
CREAM TOPICAL
Status: DISCONTINUED | OUTPATIENT
Start: 2021-11-13 | End: 2021-11-14 | Stop reason: HOSPADM

## 2021-11-13 RX ORDER — ONDANSETRON 2 MG/ML
4 INJECTION INTRAMUSCULAR; INTRAVENOUS EVERY 6 HOURS PRN
Status: DISCONTINUED | OUTPATIENT
Start: 2021-11-13 | End: 2021-11-13 | Stop reason: HOSPADM

## 2021-11-13 RX ORDER — FENTANYL CITRATE 50 UG/ML
INJECTION, SOLUTION INTRAMUSCULAR; INTRAVENOUS
Status: COMPLETED | OUTPATIENT
Start: 2021-11-13 | End: 2021-11-13

## 2021-11-13 RX ORDER — OXYTOCIN/0.9 % SODIUM CHLORIDE 30/500 ML
100-340 PLASTIC BAG, INJECTION (ML) INTRAVENOUS CONTINUOUS PRN
Status: DISCONTINUED | OUTPATIENT
Start: 2021-11-13 | End: 2021-11-14

## 2021-11-13 RX ADMIN — FENTANYL CITRATE 100 MCG: 50 INJECTION, SOLUTION INTRAMUSCULAR; INTRAVENOUS at 21:43

## 2021-11-13 RX ADMIN — LIDOCAINE HYDROCHLORIDE 20 ML: 10 INJECTION, SOLUTION INFILTRATION; PERINEURAL at 23:39

## 2021-11-13 RX ADMIN — ROPIVACAINE HYDROCHLORIDE 10 ML: 2 INJECTION, SOLUTION EPIDURAL; INFILTRATION at 21:43

## 2021-11-13 RX ADMIN — SODIUM CHLORIDE, POTASSIUM CHLORIDE, SODIUM LACTATE AND CALCIUM CHLORIDE 1000 ML: 600; 310; 30; 20 INJECTION, SOLUTION INTRAVENOUS at 20:55

## 2021-11-13 RX ADMIN — Medication 12 ML/HR: at 21:45

## 2021-11-13 RX ADMIN — SODIUM CHLORIDE, POTASSIUM CHLORIDE, SODIUM LACTATE AND CALCIUM CHLORIDE: 600; 310; 30; 20 INJECTION, SOLUTION INTRAVENOUS at 21:26

## 2021-11-13 RX ADMIN — Medication 340 ML/HR: at 23:37

## 2021-11-13 ASSESSMENT — ENCOUNTER SYMPTOMS: ORTHOPNEA: 0

## 2021-11-13 ASSESSMENT — LIFESTYLE VARIABLES: TOBACCO_USE: 0

## 2021-11-13 NOTE — TELEPHONE ENCOUNTER
Patient calling about contractions. Patient has counted from 4:30 pm uintil now. They are coming every 9 minutes and now every 5 minutes apart and lasting 45-55 seconds. Patient can talk through it but doesn't want to. Patient needs to sit when they happen.   OB Triage Call      Is patient's OB/Midwife with the [facility] or [facility]? [facility]- Proceed with triage  Laura Fisher. Bloody show at 2:30 pm.     Reason for call: contractions every 9-5 minutes for over an hour    Assessment: L and D    Plan: page to on call CNM    Patient plans to deliver at [facility]    Patient's primary OB Provider is Midwives.      Per protocol recommendations Patient to be evaluated in L&D. Patient's primary OB is M[facility] Jacobo[facility]. Paged on-call midwife for patient's primary OB clinic (refer to where patient is seen as midwives may go to multiple locations) Corazon Houston CNM to call [facility] back at 5:41 pm. .  Call returned at 5:43 pm and advised on triage assessment. Does midwife recommend L&D evaluation? Unknown- CNM contacted patient directly to develop plan of care. No further actions needed at this time.     Is patient's delivering hospital on divert? No      40w3d    Estimated Date of Delivery: Nov 10, 2021        OB History    Para Term  AB Living   2 1 1 0 0 1   SAB IAB Ectopic Multiple Live Births   0 0 0 0 1      # Outcome Date GA Lbr Olivier/2nd Weight Sex Delivery Anes PTL Lv   2 Current            1 Term 19 39w1d 24:55 / 03:51 3.13 kg (6 lb 14.4 oz) F Vag-Spont EPI, Nitrous N JER      Complications: Prolonged PROM (>18 hours), Hemorrhage      Name: Minoo      Apgar1: 9  Apgar5: 9       Lab Results   Component Value Date    GBS Negative 2019          Kirsten Levy, RN 21 5:33 PM  [facility] Nurse Advisor    Reason for Disposition    [1] History of prior delivery (multipara) AND [2] contractions < 10 minutes apart AND [3] present 1 hour    Additional Information    Negative: Passed  "out (i.e., lost consciousness, collapsed and was not responding)    Negative: Shock suspected (e.g., cold/pale/clammy skin, too weak to stand, low BP, rapid pulse)    Negative: Difficult to awaken or acting confused (e.g., disoriented, slurred speech)    Negative: [1] SEVERE abdominal pain (e.g., excruciating) AND [2] constant AND [3] present > 1 hour    Negative: Severe bleeding (e.g., continuous red blood from vagina, or large blood clots)    Negative: Umbilical cord hanging out of the vagina (shiny, white, curled appearance, \"like telephone cord\")    Negative: Uncontrollable urge to push (i.e., feels like baby is coming out now)    Negative: Can see baby    Negative: Sounds like a life-threatening emergency to the triager    Negative: Pregnant < 37 weeks (i.e., )    Negative: [1] Uncertain delivery date AND [2] possibly pregnant < 37 weeks (i.e., )    Negative: [1] First baby (primipara) AND [2] contractions < 6 minutes apart  AND [3] present 2 hours    Protocols used: PREGNANCY - LABOR-A-AH      "

## 2021-11-13 NOTE — TELEPHONE ENCOUNTER
Called and talked with Shiraz.  She states that at approximately 1500, she started to have bloody show.  At 1600, she started to have contractions.  She states that at first, contractions were 9 min apart and lasting 40-50 seconds.  Over the past hour, contractions have become closer together and stronger.  At this time contractions are every 3-5 minutes, lasting 50-60 seconds, feeling like strong cramping.  Denies loss of fluid, vaginal bleeding, +fetal movement.      She states that she would like to come to the hospital for evaluation but needs to have MIL and mother come to her house first.  Once family arrives at her house, she will plan to go to hospital with Markie and her mother as support people.  She states that she will arrive to hospital in the next 30-60 min for eval.     MAC called and given report. Will plan for BSUS to verify fetal position d/t history of ECV at 37 w.  Patient plans epidural, has history of occult spina bifida.  Will plan for anesthesia consult once admitted.     Corazon NUÑEZ, SARBJIT, [facility]  [phone]

## 2021-11-14 PROBLEM — Z87.59 HISTORY OF RETAINED PLACENTA: Status: ACTIVE | Noted: 2021-11-14

## 2021-11-14 LAB
ABO/RH(D): ABNORMAL
ANTIBODY ID: NORMAL
ANTIBODY SCREEN: POSITIVE
BASOPHILS # BLD AUTO: 0 10E3/UL (ref 0–0.2)
BASOPHILS NFR BLD AUTO: 0 %
EOSINOPHIL # BLD AUTO: 0 10E3/UL (ref 0–0.7)
EOSINOPHIL NFR BLD AUTO: 0 %
ERYTHROCYTE [DISTWIDTH] IN BLOOD BY AUTOMATED COUNT: 13.7 % (ref 10–15)
ERYTHROCYTE [DISTWIDTH] IN BLOOD BY AUTOMATED COUNT: 13.8 % (ref 10–15)
HCT VFR BLD AUTO: 30.8 % (ref 35–47)
HCT VFR BLD AUTO: 39.3 % (ref 35–47)
HGB BLD-MCNC: 10.1 G/DL (ref 11.7–15.7)
HGB BLD-MCNC: 10.1 G/DL (ref 11.7–15.7)
HGB BLD-MCNC: 13.1 G/DL (ref 11.7–15.7)
IMM GRANULOCYTES # BLD: 0.2 10E3/UL
IMM GRANULOCYTES NFR BLD: 1 %
LYMPHOCYTES # BLD AUTO: 0.9 10E3/UL (ref 0.8–5.3)
LYMPHOCYTES NFR BLD AUTO: 7 %
MCH RBC QN AUTO: 32.8 PG (ref 26.5–33)
MCH RBC QN AUTO: 33.5 PG (ref 26.5–33)
MCHC RBC AUTO-ENTMCNC: 32.8 G/DL (ref 31.5–36.5)
MCHC RBC AUTO-ENTMCNC: 33.3 G/DL (ref 31.5–36.5)
MCV RBC AUTO: 100 FL (ref 78–100)
MCV RBC AUTO: 101 FL (ref 78–100)
MONOCYTES # BLD AUTO: 0.3 10E3/UL (ref 0–1.3)
MONOCYTES NFR BLD AUTO: 2 %
NEUTROPHILS # BLD AUTO: 12.1 10E3/UL (ref 1.6–8.3)
NEUTROPHILS NFR BLD AUTO: 90 %
NRBC # BLD AUTO: 0 10E3/UL
NRBC BLD AUTO-RTO: 0 /100
PLATELET # BLD AUTO: 172 10E3/UL (ref 150–450)
PLATELET # BLD AUTO: 187 10E3/UL (ref 150–450)
RBC # BLD AUTO: 3.08 10E6/UL (ref 3.8–5.2)
RBC # BLD AUTO: 3.91 10E6/UL (ref 3.8–5.2)
SPECIMEN EXPIRATION DATE: ABNORMAL
SPECIMEN EXPIRATION DATE: NORMAL
T PALLIDUM AB SER QL: NONREACTIVE
WBC # BLD AUTO: 13.5 10E3/UL (ref 4–11)
WBC # BLD AUTO: 14 10E3/UL (ref 4–11)

## 2021-11-14 PROCEDURE — 0UQMXZZ REPAIR VULVA, EXTERNAL APPROACH: ICD-10-PCS | Performed by: NURSE PRACTITIONER

## 2021-11-14 PROCEDURE — 10D17Z9 MANUAL EXTRACTION OF PRODUCTS OF CONCEPTION, RETAINED, VIA NATURAL OR ARTIFICIAL OPENING: ICD-10-PCS | Performed by: OBSTETRICS & GYNECOLOGY

## 2021-11-14 PROCEDURE — 250N000011 HC RX IP 250 OP 636: Performed by: ANESTHESIOLOGY

## 2021-11-14 PROCEDURE — 10907ZC DRAINAGE OF AMNIOTIC FLUID, THERAPEUTIC FROM PRODUCTS OF CONCEPTION, VIA NATURAL OR ARTIFICIAL OPENING: ICD-10-PCS | Performed by: NURSE PRACTITIONER

## 2021-11-14 PROCEDURE — 250N000011 HC RX IP 250 OP 636: Performed by: NURSE PRACTITIONER

## 2021-11-14 PROCEDURE — 250N000013 HC RX MED GY IP 250 OP 250 PS 637: Performed by: NURSE PRACTITIONER

## 2021-11-14 PROCEDURE — 86900 BLOOD TYPING SEROLOGIC ABO: CPT | Performed by: NURSE PRACTITIONER

## 2021-11-14 PROCEDURE — 85018 HEMOGLOBIN: CPT | Performed by: NURSE PRACTITIONER

## 2021-11-14 PROCEDURE — 120N000012 HC R&B POSTPARTUM

## 2021-11-14 PROCEDURE — 250N000009 HC RX 250: Performed by: NURSE PRACTITIONER

## 2021-11-14 PROCEDURE — 86780 TREPONEMA PALLIDUM: CPT | Performed by: NURSE PRACTITIONER

## 2021-11-14 PROCEDURE — 85014 HEMATOCRIT: CPT | Performed by: NURSE PRACTITIONER

## 2021-11-14 PROCEDURE — 59400 OBSTETRICAL CARE: CPT | Performed by: NURSE PRACTITIONER

## 2021-11-14 PROCEDURE — 36415 COLL VENOUS BLD VENIPUNCTURE: CPT | Performed by: NURSE PRACTITIONER

## 2021-11-14 PROCEDURE — 0HQ9XZZ REPAIR PERINEUM SKIN, EXTERNAL APPROACH: ICD-10-PCS | Performed by: NURSE PRACTITIONER

## 2021-11-14 PROCEDURE — 59414 DELIVER PLACENTA: CPT | Performed by: OBSTETRICS & GYNECOLOGY

## 2021-11-14 RX ORDER — MISOPROSTOL 200 UG/1
800 TABLET ORAL
Status: DISCONTINUED | OUTPATIENT
Start: 2021-11-14 | End: 2021-11-15 | Stop reason: HOSPADM

## 2021-11-14 RX ORDER — FENTANYL CITRATE 50 UG/ML
50-100 INJECTION, SOLUTION INTRAMUSCULAR; INTRAVENOUS ONCE
Status: DISCONTINUED | OUTPATIENT
Start: 2021-11-14 | End: 2021-11-15 | Stop reason: HOSPADM

## 2021-11-14 RX ORDER — OXYTOCIN/0.9 % SODIUM CHLORIDE 30/500 ML
340 PLASTIC BAG, INJECTION (ML) INTRAVENOUS CONTINUOUS PRN
Status: DISCONTINUED | OUTPATIENT
Start: 2021-11-14 | End: 2021-11-15 | Stop reason: HOSPADM

## 2021-11-14 RX ORDER — CEFAZOLIN SODIUM 1 G/3ML
1 INJECTION, POWDER, FOR SOLUTION INTRAMUSCULAR; INTRAVENOUS ONCE
Status: COMPLETED | OUTPATIENT
Start: 2021-11-14 | End: 2021-11-14

## 2021-11-14 RX ORDER — CEFAZOLIN SODIUM 1 G/3ML
1 INJECTION, POWDER, FOR SOLUTION INTRAMUSCULAR; INTRAVENOUS EVERY 8 HOURS
Status: DISCONTINUED | OUTPATIENT
Start: 2021-11-14 | End: 2021-11-14

## 2021-11-14 RX ORDER — MISOPROSTOL 200 UG/1
400 TABLET ORAL
Status: DISCONTINUED | OUTPATIENT
Start: 2021-11-14 | End: 2021-11-15 | Stop reason: HOSPADM

## 2021-11-14 RX ORDER — BISACODYL 10 MG
10 SUPPOSITORY, RECTAL RECTAL DAILY PRN
Status: DISCONTINUED | OUTPATIENT
Start: 2021-11-14 | End: 2021-11-15 | Stop reason: HOSPADM

## 2021-11-14 RX ORDER — METHYLERGONOVINE MALEATE 0.2 MG/ML
200 INJECTION INTRAVENOUS
Status: DISCONTINUED | OUTPATIENT
Start: 2021-11-14 | End: 2021-11-15 | Stop reason: HOSPADM

## 2021-11-14 RX ORDER — MODIFIED LANOLIN
OINTMENT (GRAM) TOPICAL
Status: DISCONTINUED | OUTPATIENT
Start: 2021-11-14 | End: 2021-11-15 | Stop reason: HOSPADM

## 2021-11-14 RX ORDER — ACETAMINOPHEN 325 MG/1
650 TABLET ORAL EVERY 4 HOURS PRN
Status: DISCONTINUED | OUTPATIENT
Start: 2021-11-14 | End: 2021-11-15 | Stop reason: HOSPADM

## 2021-11-14 RX ORDER — IBUPROFEN 400 MG/1
800 TABLET, FILM COATED ORAL EVERY 6 HOURS PRN
Status: DISCONTINUED | OUTPATIENT
Start: 2021-11-14 | End: 2021-11-15 | Stop reason: HOSPADM

## 2021-11-14 RX ORDER — TRANEXAMIC ACID 10 MG/ML
1 INJECTION, SOLUTION INTRAVENOUS EVERY 30 MIN PRN
Status: DISCONTINUED | OUTPATIENT
Start: 2021-11-14 | End: 2021-11-15 | Stop reason: HOSPADM

## 2021-11-14 RX ORDER — CARBOPROST TROMETHAMINE 250 UG/ML
250 INJECTION, SOLUTION INTRAMUSCULAR
Status: DISCONTINUED | OUTPATIENT
Start: 2021-11-14 | End: 2021-11-15 | Stop reason: HOSPADM

## 2021-11-14 RX ORDER — DOCUSATE SODIUM 100 MG/1
100 CAPSULE, LIQUID FILLED ORAL DAILY
Status: DISCONTINUED | OUTPATIENT
Start: 2021-11-14 | End: 2021-11-15 | Stop reason: HOSPADM

## 2021-11-14 RX ORDER — OXYTOCIN 10 [USP'U]/ML
10 INJECTION, SOLUTION INTRAMUSCULAR; INTRAVENOUS
Status: DISCONTINUED | OUTPATIENT
Start: 2021-11-14 | End: 2021-11-15 | Stop reason: HOSPADM

## 2021-11-14 RX ORDER — HYDROCORTISONE 2.5 %
CREAM (GRAM) TOPICAL 3 TIMES DAILY PRN
Status: DISCONTINUED | OUTPATIENT
Start: 2021-11-14 | End: 2021-11-15 | Stop reason: HOSPADM

## 2021-11-14 RX ADMIN — DOCUSATE SODIUM 100 MG: 100 CAPSULE, LIQUID FILLED ORAL at 07:59

## 2021-11-14 RX ADMIN — IBUPROFEN 600 MG: 600 TABLET ORAL at 08:01

## 2021-11-14 RX ADMIN — ACETAMINOPHEN 650 MG: 325 TABLET, FILM COATED ORAL at 02:02

## 2021-11-14 RX ADMIN — IBUPROFEN 800 MG: 400 TABLET, FILM COATED ORAL at 20:07

## 2021-11-14 RX ADMIN — ACETAMINOPHEN 650 MG: 325 TABLET, FILM COATED ORAL at 08:01

## 2021-11-14 RX ADMIN — Medication 100 ML/HR: at 00:37

## 2021-11-14 RX ADMIN — ACETAMINOPHEN 650 MG: 325 TABLET, FILM COATED ORAL at 14:11

## 2021-11-14 RX ADMIN — ACETAMINOPHEN 650 MG: 325 TABLET, FILM COATED ORAL at 20:07

## 2021-11-14 RX ADMIN — MISOPROSTOL 800 MCG: 200 TABLET ORAL at 00:52

## 2021-11-14 RX ADMIN — FENTANYL CITRATE 50 MCG: 50 INJECTION, SOLUTION INTRAMUSCULAR; INTRAVENOUS at 00:50

## 2021-11-14 RX ADMIN — TRANEXAMIC ACID 1 G: 10 INJECTION, SOLUTION INTRAVENOUS at 00:53

## 2021-11-14 RX ADMIN — ONDANSETRON 4 MG: 2 INJECTION INTRAMUSCULAR; INTRAVENOUS at 00:18

## 2021-11-14 RX ADMIN — KETOROLAC TROMETHAMINE 30 MG: 30 INJECTION, SOLUTION INTRAMUSCULAR; INTRAVENOUS at 01:15

## 2021-11-14 RX ADMIN — CEFAZOLIN 1 G: 1 INJECTION, POWDER, FOR SOLUTION INTRAMUSCULAR; INTRAVENOUS at 02:25

## 2021-11-14 RX ADMIN — IBUPROFEN 800 MG: 400 TABLET, FILM COATED ORAL at 14:11

## 2021-11-14 ASSESSMENT — ACTIVITIES OF DAILY LIVING (ADL)
ADLS_ACUITY_SCORE: 3

## 2021-11-14 NOTE — PROGRESS NOTES
MD Progress Note    Called to assess patient after retained placenta following an uncomplicated delivery. Shiraz has a history of a retained placenta in her prior pregnancy as well with subsequent postpartum hemorrhage. On arrival bleeding was well controlled. She was not anesthetic therefore 50mcg of fentanyl was given. Her vaginal laceration was hemostatic. On palpation some of the placenta was in the internal os. Using a bimanual technique a cleavage plane was made between the placenta and the uterine surface and the placenta was able to be removed intact. A repeat bimanual exam showed an empty cavity ensuring to sweep to the uterine fundus.   The fundus was firm. Given her retained placenta recommend rectal misoprostol and 1g TXA IV.  Juhi Root MD

## 2021-11-14 NOTE — LACTATION NOTE
Initial Lactation visit. Per Shiraz, infant has been feeding well. Had procedure this morning and was sleepy afterwards. She's really happy with how it is going so far, as breastfeeding in the beginning with first child was challenging and it took longer for her milk to come in d/t increased blood loss.  Second night expectations reviewed. Encourage latch checks.   Recommend unlimited, frequent breast feedings: At least 8 times every 24 hours. Avoid pacifiers and supplementation with formula unless medically indicated. Explained benefits of holding baby skin on skin to help promote better breastfeeding outcomes. Will revisit as needed.    Reena Mcmillan, RN, IBCLC

## 2021-11-14 NOTE — ANESTHESIA POSTPROCEDURE EVALUATION
Patient: Shiraz Boykin    Procedure: * No procedures listed *       Diagnosis:* No pre-op diagnosis entered *  Diagnosis Additional Information: No value filed.    Anesthesia Type:  No value filed.    Note:  Disposition: Outpatient   Postop Pain Control: Uneventful            Sign Out: Well controlled pain   PONV: No   Neuro/Psych: Uneventful            Sign Out: Acceptable/Baseline neuro status   Airway/Respiratory: Uneventful            Sign Out: Acceptable/Baseline resp. status   CV/Hemodynamics: Uneventful            Sign Out: Acceptable CV status; No obvious hypovolemia; No obvious fluid overload   Other NRE: NONE   DID A NON-ROUTINE EVENT OCCUR? No    Event details/Postop Comments:  The patient denies numbness, weakness, or tingling in either of the lower extremities; denies positional headache; and denies significant back pain. The patient was then counseled on any potential concerning symptoms and if/when to reach out for further guidance. They expressed understanding of the instructions and felt comfortable with the plan.           Last vitals:  Vitals Value Taken Time   BP     Temp     Pulse     Resp     SpO2         Electronically Signed By: Leighton Pagan MD  November 14, 2021  2:39 PM

## 2021-11-14 NOTE — PLAN OF CARE
VSS. Fundus firm, midline U/2 with scant flow. Pain well controlled with tylenol/ibuprofen.  Ambulating well. Voided x1 post delivery.  Working on breastfeeding  and  cares. Progressing per care plan. Continue to monitor and notify MD as needed.

## 2021-11-14 NOTE — PROGRESS NOTES
Meredith SOUSA Progress Note: Postpartum Day #1    2021  5:22 PM    SUBJECTIVE:  Patient is stable and is tolerating acitivity well  Baby is rooming in  Complications since 2 hours post delivery: None  Pain is well controlled.  Patient is taking pain medications, Tylenol and Ibuprofen  Breastfeeding status:initiated and going well  Elimination:  She is voiding without difficulty.  She has not had a bowel movement. Positive flatus  Desired contraception Unsure  Denies heavy bleeding and passing large clots. Denies feeling symptoms of anemia.   Feels good about birth experience. Feels better PP this time than the first time. Epidural did not work as well as with her first birth, but overall happy with experience.     OBJECTIVE:  BP 95/58   Pulse 76   Temp 98.3  F (36.8  C) (Oral)   Resp 16   LMP 2021 (Approximate)   SpO2 98%   Breastfeeding Yes     Constitutional: healthy, alert, no distress, cooperative and smiling    Breasts: Currently breastfeeding    Fundus: Uterine fundus is firm, non-tender and at 2 below the level of the umbilicus    Perineum: Perineum is intact and/or well approximated, minimal swelling    Lochia: Lochia is appropriate for the duration of time since delivery.     ASSESSMENT:  PPD #1    Doing well  Normal healing wound  No excessive bleeding  Pain well-controlled  Breastfeeding, going well.     Hemoglobin   Date Value Ref Range Status   2021 10.1 (L) 11.7 - 15.7 g/dL Final   2021 10.1 (L) 11.7 - 15.7 g/dL Final   2021 13.3 11.7 - 15.7 g/dL Final       PLAN:  Continue routine care  Iron supplementation, if becomes symptomatic  Lactation consultation  Reviewed postpartum care plan including MyChart check-in within one week, 2 week and 6 week visits.  Plans unsure for contraception postpartum  Anticipated discharge tomorrow morning 11/15/2021    JOAQUIN Deutsch CNM

## 2021-11-14 NOTE — PLAN OF CARE
Data: Patient presented to [facility] at 1901.   Reason for maternal/fetal assessment per patient is Contractions  Patient is a . Prenatal record reviewed.      OB History    Para Term  AB Living   2 1 1 0 0 1   SAB IAB Ectopic Multiple Live Births   0 0 0 0 1      # Outcome Date GA Lbr Olivier/2nd Weight Sex Delivery Anes PTL Lv   2 Current            1 Term 19 39w1d 24:55 / 03:51 3.13 kg (6 lb 14.4 oz) F Vag-Spont EPI, Nitrous N JER      Complications: Prolonged PROM (>18 hours), Hemorrhage      Name: Minoo      Apgar1: 9  Apgar5: 9   . Medical history:   Past Medical History:   Diagnosis Date     Anemia due to blood loss, acute 2019     Crohn's disease (H)      Excessive postpartum bleeding      Rh negative state in antepartum period, third trimester 2019     Spina bifida occulta 2009     Spondylisthesis 2009     Third-stage postpartum hemorrhage 2019     Tremor 2017     Ulcer of jejunum 10/2008     Varicella    . Gestational Age 40w3d. VSS. Fetal movement present. Patient denies cramping, backache, vaginal discharge, pelvic pressure, UTI symptoms, GI problems, bloody show, vaginal bleeding, edema, headache, visual disturbances, epigastric or URQ pain, abdominal pain, rupture of membranes. Contractions started at 1630 and continued to get stronger in frequency and intensity.  Support persons present.  Action: Verbal consent for EFM. Triage assessment completed. EFM applied for fetal wellbeing during contractions. Uterine assessment soft and non tender to touch. Fetal assessment: Presumed adequate fetal oxygenation documented (see flow record).   Response: Corazon Houston CNM informed of SVE, contraction pattern, and FHT. Plan per provider is will monitor, check fetal presentation at bedside.. Patient verbalized agreement with plan.  l

## 2021-11-14 NOTE — PLAN OF CARE
Shiraz Boykin MRN# [id number]   Age: 36 year old YOB: 1985     Patient Vitals for the past 24 hrs:   BP Temp Temp src Resp SpO2 Oximeter Heart Rate   11/14/21 0200 104/65 -- -- 16 -- 76 bpm   11/14/21 0145 106/71 -- -- 16 -- 76 bpm   11/14/21 0130 110/71 -- -- 16 98 % 79 bpm   11/14/21 0126 -- 98.2  F (36.8  C) Temporal -- -- --   11/14/21 0125 -- -- -- -- 99 % 83 bpm   11/14/21 0115 123/73 98.3  F (36.8  C) Temporal 16 98 % --   11/14/21 0100 122/66 -- -- 16 99 % --   11/14/21 0055 116/67 -- -- -- -- 81 bpm   11/14/21 0027 115/76 -- -- -- -- 80 bpm   11/13/21 2359 125/81 -- -- -- -- 91 bpm   11/13/21 2327 -- 97.5  F (36.4  C) Temporal 16 -- 55 bpm   11/13/21 2324 -- -- -- -- 96 % 97 bpm   11/13/21 2304 -- -- -- -- 100 % 75 bpm   11/13/21 2301 114/73 -- -- -- -- 75 bpm   11/13/21 2259 -- -- -- -- 100 % 86 bpm   11/13/21 2227 120/75 -- -- -- -- 83 bpm   11/13/21 2224 -- -- -- -- 100 % 83 bpm   11/13/21 2222 104/73 -- -- -- -- 85 bpm   11/13/21 2219 -- -- -- -- 100 % 82 bpm   11/13/21 2217 101/66 -- -- -- -- 83 bpm   11/13/21 2214 -- -- -- -- 100 % 84 bpm   11/13/21 2211 103/62 -- -- -- -- 83 bpm   11/13/21 2209 -- -- -- -- 100 % 80 bpm   11/13/21 2205 102/67 -- -- -- -- 81 bpm   11/13/21 2204 -- -- -- -- 100 % 85 bpm   11/13/21 2203 103/68 -- -- -- -- 86 bpm   11/13/21 2201 104/69 -- -- -- -- 81 bpm   11/13/21 2159 111/69 -- -- -- 100 % 84 bpm   11/13/21 2157 110/69 -- -- -- -- 82 bpm   11/13/21 2155 111/74 -- -- -- -- 91 bpm   11/13/21 2154 -- -- -- -- 100 % 87 bpm   11/13/21 2153 108/72 -- -- -- -- 90 bpm   11/13/21 2151 110/72 -- -- -- -- 85 bpm   11/13/21 2149 114/71 -- -- -- 100 % 91 bpm   11/13/21 2147 116/69 -- -- -- -- 87 bpm   11/13/21 2145 114/72 -- -- 16 100 % 87 bpm   11/13/21 2101 112/72 -- -- 16 -- 82 bpm   11/13/21 2059 -- (!) 96.6  F (35.9  C) Temporal -- -- --   11/13/21 1910 110/76 97.7  F (36.5  C) Temporal -- -- 82 bpm           Pt brought to room. IV started, unable to draw labs  off of IV. Fluid bolus started because pt requesting epidural. Put on monitors at 2058. Sat up for epidural at 2130. Pt laid down at 2155. At 2120 pt epidural helping but pt having a hot spot or spot not covered in her lower right quadrent. MDA asked to come and assess. MDA in room and plan to change maternal position if pain related to fetal position. Unable to get hot spot to go away. CNM called to room. Assess cervix and still 8 cm so decided not to break her water at that time. Pt placed in side lying release on left then right side for 3 contractions. Then pt stating she felt more pressure. Pt straight cathed at 2250, but no urine output since pt urinated before her epidural. Pt Ruptured AROM by CNM at 2323 and pt started feeling pushy, she was 9-10 when ruptured. CNM able to reduce cervix and live male infant born at 2335 and placed skin to skin with mom.      Placenta failed to detach so RN called MD backup in to assess.     MD at bedside at 2350. Placenta manually removed by MD.    .     D/t extra bleeding after placenta removed, rectal cytorec and txa given.     Uterus firm with scant flow.     Will continue to monitor and assess.

## 2021-11-14 NOTE — PLAN OF CARE
Pt ambulated well to bathroom. Pt was able to void. Pad under pt weighed and only found to have 138 ml of blood.     Data: Shiraz Boykin transferred to [facility] via wheelchair at 0330. Baby transferred via parent's arms.  Action: Receiving unit notified of transfer: Yes. Patient and family notified of room change. Report given to Maty WILDER RN at 0335. Belongings sent to receiving unit. Accompanied by Registered Nurse. Oriented patient to surroundings. Call light within reach. ID bands double-checked with receiving RN.  Response: Patient tolerated transfer and is stable.
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
## 2021-11-14 NOTE — H&P
[facility] Labor Admission History & Physical    Shiraz Boykin is a 36 year old  with an IUP at 40w3d  ; ,   Partner/support Person: Markie  Language Barrier: English  [facility]  Provider: CNM's    Shiraz Boykin is admitted to the Birthplace at [facility] on 2021 at 8:41 PM       History of present inllness/Chief Complaint:    Here with: spontaneous onset of labor.  Shiraz reports contractions started at approximately 1600.  Contractions became q 3-5 minutes and more intense by 1730.  [place] [facility] [place] approx. 1930.   Patient reports contractions are Regular           Baby active Yes  Membranes are bulging.  Bloody show No   Any changes with medical history since last prenatal visit No      Obstetrical history  Estimated Date of Delivery: Nov 10, 2021 determined by LMP  Patient's last menstrual period was 2021 (approximate).   Dating U/S: 2021    Fetal anatomic survey: Normal, boy  Placenta: rt lateral    PRENATAL COURSE  Prenatal care began at 10 wks gestation for a total of 13 prenatal visits.  Total wt gain 28 lbs; There is no height or weight on file to calculate BMI.  Prenatal Blood Pressure: WNL  Prenatal course was   complicated by    Patient Active Problem List    Diagnosis Date Noted     Labor and delivery, indication for care 2021     Priority: Medium     Anemia during pregnancy in third trimester 10/03/2021     Priority: Medium     Rh negative status during pregnancy in third trimester 2021     Priority: Medium     Adnexal cyst 2021     Priority: Medium     L ovarian complex cyst     Dating ultrasound: 3.15x2.03x1.85  Level II ultrasound: 21 mm x 30 mm x 20 mm. Mean 23.7 mm. Vol 6.597 cm^3    Follow up postpartum       History of postpartum hemorrhage, currently pregnant in third trimester 2021     Priority: Medium     Supervision of high-risk pregnancy 2021     Priority: Medium     **HR  FOB:Markie WOLF:  Nov 10, 2021  B neg/Placenta/ Boy  Genetic: dec  Tdap: 9/1   Flu: 11/30/20   GBS:     Problems: AMA, RH neg Rhogam 9/1, Occult Spina bifida (MRI done), spondylolisthesis/anemia  Sib:Beverley   Avascular cyst on L ovary - FU after del  1 hr GCT/hgb: Passed 125, 10.2       Spina bifida occulta 01/17/2019     Priority: Medium     Plan for early anesthesia consult in labor       Crohn's disease without complication (H) 01/17/2019     Priority: Medium     Shiraz states she is unsure if this was a correct diagnosis; does not take any medications or see any providers for this       History of ulcer disease 01/17/2019     Priority: Medium     Spondylolisthesis 01/17/2019     Priority: Medium     L5       Cervical cancer screening 01/17/2019     Priority: Medium     Pap smear due PP       Tdap: 9/1/21  Rhogam: 10/1/21    Patient Active Problem List   Diagnosis     Spina bifida occulta     Crohn's disease without complication (H)     History of ulcer disease     Spondylolisthesis     Cervical cancer screening     Supervision of high-risk pregnancy     Adnexal cyst     History of postpartum hemorrhage, currently pregnant in third trimester     Rh negative status during pregnancy in third trimester     Anemia during pregnancy in third trimester     Labor and delivery, indication for care       HISTORY  No Known Allergies  Past Medical History:   Diagnosis Date     Anemia due to blood loss, acute 5/1/2019     Crohn's disease (H) 2008     Excessive postpartum bleeding      Rh negative state in antepartum period, third trimester 1/17/2019     Spina bifida occulta 07/2009     Spondylisthesis 07/2009     Third-stage postpartum hemorrhage 4/29/2019     Tremor 2017     Ulcer of jejunum 10/2008     Varicella      Past Surgical History:   Procedure Laterality Date     APPENDECTOMY  1999     COLONOSCOPY  01/2008     ENT SURGERY      ear tubes     GI SURGERY  01/2008    Esophagogastroduodenoscopy     HEAD & NECK SURGERY  1990    T&A     Family  History   Problem Relation Age of Onset     Cancer Paternal Grandmother      Lung Cancer Paternal Grandmother      Cancer Paternal Grandfather      Lung Cancer Paternal Grandfather      Spondylolisthesis Mother         spinal fusion      Heart Disease Maternal Grandfather         Afib, ablation      Stomach Problem Maternal Grandfather         needed g-tube     No Known Problems Other      Social History     Tobacco Use     Smoking status: Never Smoker     Smokeless tobacco: Never Used   Substance Use Topics     Alcohol use: Not Currently     OB History    Para Term  AB Living   2 1 1 0 0 1   SAB IAB Ectopic Multiple Live Births   0 0 0 0 1      # Outcome Date GA Lbr Olivier/2nd Weight Sex Delivery Anes PTL Lv   2 Current            1 Term 19 39w1d 24:55 / 03:51 3.13 kg (6 lb 14.4 oz) F Vag-Spont EPI, Nitrous N JER      Complications: Prolonged PROM (>18 hours), Hemorrhage      Name: Minoo      Apgar1: 9  Apgar5: 9       LABS:  Lab Results   Component Value Date    ABO B 2021    RH Neg 2021    AS Positive (A) 10/21/2021    HGB 11.1 (L) 10/21/2021    HEPBANG Nonreactive 2021    CHPCRT Negative 10/02/2018    RUBELLAABIGG Immune 10/02/2018       GBS Status:   Lab Results   Component Value Date    GBS Negative 2019     Rubella: Immune    HIV: Non-Reactive   Platelets:  181 on 10/21/21  1hr GCT:  125    ROS   Pt is alert and oriented  Pt denies significant constitutional symptoms including fever and/or malaise.    Pt denies significant respiratory, cardiovacular, GI, or muscular/skeletal complaints.    Neuro: Denies HA and visual changes  Muscoloskeletal: Denies except for discomforts r/t pregnancy     PHYSICAL EXAM:  /76   Temp 97.7  F (36.5  C) (Temporal)   LMP 2021 (Approximate)   General appearance:  healthy, alert, active and mild distress   Heart: RRR  Lungs: CTA bilaterally, normal respiratory effort  Abdomen: gravid, single vertex fetus, non-tender, EFW  7.5-8 lbs.   Legs:  No bilat edema     Contractions: Pt is alexis every 2-3 minutes, lasting  seconds and palpates moderate    Fetal heart tones: Baseline 135   Variability: moderate   Accelerations: present  Decelerations: absent    NST: reactive    Cervix: 4.5/ 80%/ Mid/ soft/ -2/BBOW, Vtx.  Position verified with BSUS  Bloody show: no  Membranes:  bulging    ASSESSMENT:  36 year old  with ochoa IUP 40w3d in active labor  NST reactive  GBS negative and membranes intact  COVID pending  History of occult spina bifida  History of retained placenta requiring manual removal and PPH     PLAN:  Routine CNM care  Labs ordered: CBC, syphilis screening, COVID test, and type and screen  Teaching done r/t comfort measures, pain management options, and labor processes  Admit - see IP orders  Pain medication-requesting epidural.  Anesthesia notified of her desire and history of occult spina bifida, has MRI imaging in record.  Anticipate     JOAQUIN Sena CNM

## 2021-11-14 NOTE — PROVIDER NOTIFICATION
11/13/21 2029   Provider Notification   Provider Name/Title Corazon Houston [id number]   Method of Notification At Bedside   Request Evaluate in Person       Bedside ultrasound for position.  Baby head down.  SVE-4-5cm/80%/-1 station.  Plan to admit to labor and delivery.  Pt moved to 218 report to Angelic CASTILLO

## 2021-11-14 NOTE — ANESTHESIA PROCEDURE NOTES
Epidural catheter Procedure Note    Pre-Procedure   Staff -        Anesthesiologist:  Verna Del Rosario MD       Performed By: anesthesiologist       Location: OB       Pre-Anesthestic Checklist: patient identified, risks and benefits discussed, informed consent, pre-op evaluation and at physician/surgeon's request  Timeout:       Correct Patient: Yes        Correct Procedure: Yes        Correct Site: Yes        Correct Position: Yes   Procedure Documentation  Procedure: epidural catheter       Diagnosis: labor pain       Patient Position: sitting       Patient Prep/Sterile Barriers: sterile gloves, mask, patient draped       Skin prep: Betadine       Local skin infiltrated with 3 mL of 1% lidocaine.        Insertion Site: L3-4. (midline approach).       Technique: LORT saline        Needle Type: Touhy needle       Needle Gauge: 17.        Needle Length (Inches): 3.5        Catheter: 19 G.         Catheter threaded easily.         3 cm epidural space.        # of attempts: 1 and  # of redirects:     Assessment/Narrative         Paresthesias: No.      Test dose of 3 mL lidocaine 1.5% w/ 1:200,000 epinephrine at.         Test dose negative, 3 minutes after injection, for signs of intravascular, subdural, or intrathecal injection.       Insertion/Infusion Method: LORT saline       Aspiration negative for Heme or CSF via Epidural Catheter.    Medication(s) Administered   0.2% Ropivacaine (Epidural), 10 mL  Fentanyl PF (Epidural), 100 mcg  Medication Administration Time: 11/13/2021 9:43 PM     Comments:  No blood or complications.  Secured with adhesive spray, tegaderm, and tape.  Local anesthetic given over 10 minutes.      Orders to manage the epidural infusion have been entered and, through coordination with the nurse, we will continue to manage and monitor the patient's labor epidural. We will continuously be available to adjust as needed throughout the entire labor and delivery process.

## 2021-11-14 NOTE — PLAN OF CARE
2050  After obtaining verbal consent from patient, nasopharyngeal swab for COVID-19 test performed by ANNA Blanchard. Labeled with patient label and delivered to laboratory.

## 2021-11-14 NOTE — L&D DELIVERY NOTE
LATE ENTRY DUE TO PATIENT CARE    OB Vaginal Delivery Note    Shiraz had epidural placed but started to feel intense pain and pressure especially in her rt lower abd.  SVE at that time 8/90/-1/BBOW.  PCA bolus given, positioning side to side with peanut ball through 6 contractions.  At that time Shiraz started vomiting and feeling urge to push.  SVE ant lip/95/0/BBOW.  AROM after discussing R/B/A and consent given.  AROM for light meconium fluid, progressed to 10/100/+1 with next contraction.  Shiraz positioned in lithotomy position, started pushing with excellent maternal effort.  Viable infant boy delivered after 2 contractions, compound presentation with left hand by face.  Placenta retained >30 minutes, Dr. Root called in for evaluation for retained placenta.  IV pitocin, rectal misoprostol and IV TXA given See her notes for details.      Shiraz Boykin MRN# [id number]   Age: 36 year old YOB: 1985       GA: 40w4d  GP:   Labor Complications:    EBL:   mL  Delivery QBL:    Delivery Type: Vaginal, Spontaneous   ROM to Delivery Time: rupture date or rupture time have not been documented   Weight: 3.85 kg (8 lb 7.8 oz)    1 Minute 5 Minute 10 Minute   Apgar Totals: 8   9        DANY LIMA     Delivery Details:  Shiraz Boykin, a 36 year old  female delivered a viable infant with apgars of 8  and 9 . Patient was fully dilated and pushing after   hours   minutes in active labor. Delivery was via vaginal, spontaneous  to a sterile field under epidural;topical  anesthesia. Infant delivered in compound  left  occiput  anterior  position. Anterior and posterior shoulders delivered without difficulty. The cord was clamped, cut by FOB after cessation of pulsation and 3 vessels  were noted. Cord blood was obtained in routine fashion with the following disposition: lab .      Cord complications: none   Placenta delivered at 2021 12:51 AM  by Dr. Root by manual removal.  Placental disposition was Hospital disposal . Fundal massage performed and fundus found to be firm.     Episiotomy: none    Perineum, vagina, cervix were inspected, and the following lacerations were noted:   Perineal lacerations: 1st     labial laceration: right           Any lacerations were repaired in the usual fashion using 3.0 Vicryl and 4.0 Vicryl.    Excellent hemostasis was noted. Needle count correct. Infant and patient in delivery room in good and stable condition.        Lucretia AguilaShiraz [[id number]]    Labor Event Times    Dilation complete date: 21 Complete time: 10:33 PM   Start pushing date/time: 2021 2233      Labor Events     labor?: No  Labor Type: Spontaneous, AROM     Rupture identifier: Sac 1  Rupture date/time:        Augmentation: AROM  1:1 continuous labor support provided by?: RN       Delivery/Placenta Date and Time    Delivery Date: 21 Delivery Time: 11:35 PM   Placenta Date/Time: 2021 12:51 AM  Oxytocin given at the time of delivery: after delivery of baby  Delivering clinician: Corazon Houston APRN CNM   Other personnel present at delivery:  Provider Role   Ellen Valero RN          Vaginal Counts     Initial count performed by 2 team members:  Two Team Members   Rosemarie Huffman RN       Needles Suture Needles Sponges (RETIRED) Instruments   Initial counts 2  5    Added to count  2     Relief counts       Final counts             Placed during labor Accounted for at the end of labor   FSE     IUPC     Cervadil                Final count performed by 2 team members:  Two Team Members   TINA Houston CNM         Apgars    Living status: Living   1 Minute 5 Minute 10 Minute 15 Minute 20 Minute   Skin color: 0  1       Heart rate: 2  2       Reflex irritability: 2  2       Muscle tone: 2  2       Respiratory effort: 2  2       Total: 8  9       Apgars assigned by: HECTOR CASTILLOC-OB/EFM     Cord    Vessels: 3 Vessels   "  Cord Complications: None               Cord Blood Disposition: Lab    Gases Sent?: No    Delayed cord clamping?: Yes    Cord Clamping Delay (seconds):  seconds    Stem cell collection?: No        Resuscitation    Methods: None  Output in Delivery Room: Stool      Measurements    Weight: 8 lb 7.8 oz Length: 1' 9.25\"   Head circumference: 35.6 cm    Output in delivery room: Stool     Skin to Skin and Feeding Plan    Skin to skin initiation date/time: 1841    Skin to skin with: Mother  Skin to skin end date/time: 1841    Breastfeeding initiated date/time: 2021 0000     Labor Events and Shoulder Dystocia    Fetal Tracing Prior to Delivery: Category 1     Delivery (Maternal) (Provider to Complete) ([id number])    Episiotomy: None  Perineal lacerations: 1st Repaired?: Yes   Labial laceration: right Repaired?: Yes   Repair suture: 3-0 Vicryl, 4-0 Vicryl  Number of repair packets: 2  Genital tract inspection done: Pos     Blood Loss  Mother: LucretiaShiraz SANDOVAL #[id number]   Start of Mother's Information    Delivery Blood Loss  21 1135 - 21 0214    Delivery QBL (mL) Hospital Encounter 660 mL    Total  660 mL         End of Mother's Information  Mother: BoykinShiraz SANDOVAL #[id number]          Delivery - Provider to Complete ([id number])    Delivering clinician: Corazon Houston APRN CNM  CNM Care: Exclusive CNM care in labor  Attempted Delivery Types (Choose all that apply): Spontaneous Vaginal Delivery  Delivery Type (Choose the 1 that will go to the Birth History): Vaginal, Spontaneous                   Other personnel:  Provider Role   Ellen Valero RN                 Placenta    Date/Time: 2021 12:51 AM  Removal: Manual Removal  Comments: by Dr. Root  Disposition: Hospital disposal           Anesthesia    Method: Epidural, TOPICAL  Cervical dilation at placement: 4-7                Presentation and Position    Presentation: Compound    Position: Left Occiput " Anterior               A:   37yo  s/p  viable infant boy  Retained placenta requiring manual removal  Rh negative  COVID neg  Lactating mother    P:  Routine PP cares  Hgb @ 0700  IV Ancef x 1 dose  Rhogam to be given in indicated by blood bank  Lactation consultation  Consider PO iron supplementation if HGB 10 or less  Discharge PP day 1 or 2    JOAQUIN SenaM

## 2021-11-14 NOTE — PROVIDER NOTIFICATION
11/14/21 0010   Provider Notification   Provider Name/Title Dr. Root   Method of Notification Phone   Request Evaluate in Person   Notification Reason Other (Comment)     SARBJIT Houston ask RN to call MD in d/t retained placenta not detached post delivery. MD on way in.

## 2021-11-15 VITALS
OXYGEN SATURATION: 98 % | TEMPERATURE: 97.7 F | HEART RATE: 69 BPM | DIASTOLIC BLOOD PRESSURE: 76 MMHG | RESPIRATION RATE: 16 BRPM | SYSTOLIC BLOOD PRESSURE: 110 MMHG

## 2021-11-15 DIAGNOSIS — N83.202 LEFT OVARIAN CYST: Primary | ICD-10-CM

## 2021-11-15 LAB — T PALLIDUM AB SER QL: NONREACTIVE

## 2021-11-15 PROCEDURE — 250N000013 HC RX MED GY IP 250 OP 250 PS 637: Performed by: NURSE PRACTITIONER

## 2021-11-15 RX ORDER — IBUPROFEN 800 MG/1
800 TABLET, FILM COATED ORAL EVERY 8 HOURS PRN
Qty: 90 TABLET | Refills: 0 | Status: SHIPPED | OUTPATIENT
Start: 2021-11-15

## 2021-11-15 RX ORDER — ACETAMINOPHEN 325 MG/1
325-650 TABLET ORAL EVERY 4 HOURS PRN
Qty: 90 TABLET | Refills: 0 | Status: SHIPPED | OUTPATIENT
Start: 2021-11-15 | End: 2021-12-13

## 2021-11-15 RX ORDER — DOCUSATE SODIUM 100 MG/1
100 CAPSULE, LIQUID FILLED ORAL 2 TIMES DAILY PRN
Qty: 60 CAPSULE | Refills: 0 | Status: SHIPPED | OUTPATIENT
Start: 2021-11-15 | End: 2021-12-13

## 2021-11-15 RX ADMIN — IBUPROFEN 800 MG: 400 TABLET, FILM COATED ORAL at 08:21

## 2021-11-15 RX ADMIN — ACETAMINOPHEN 650 MG: 325 TABLET, FILM COATED ORAL at 08:21

## 2021-11-15 RX ADMIN — DOCUSATE SODIUM 100 MG: 100 CAPSULE, LIQUID FILLED ORAL at 08:21

## 2021-11-15 RX ADMIN — ACETAMINOPHEN 650 MG: 325 TABLET, FILM COATED ORAL at 02:11

## 2021-11-15 RX ADMIN — IBUPROFEN 800 MG: 400 TABLET, FILM COATED ORAL at 02:10

## 2021-11-15 ASSESSMENT — ACTIVITIES OF DAILY LIVING (ADL)
ADLS_ACUITY_SCORE: 3

## 2021-11-15 NOTE — PLAN OF CARE
VSS Pt using Ibuprofen and tylenol for pain control. Up independently in the room. Breastfeeding on demand, latching well. Discharging to home with  and infant this morning.    Pt discharged to home at 1220 with  and infant and prescription medication

## 2021-11-15 NOTE — DISCHARGE SUMMARY
CNM Postpartum Discharge Note    SIGNIFICANT PROBLEMS:  Patient Active Problem List    Diagnosis Date Noted     History of retained placenta 2021     Priority: Medium      (normal spontaneous vaginal delivery) 2021     Priority: Medium     Lactating mother 2021     Priority: Medium     Labor and delivery, indication for care 2021     Priority: Medium     Anemia during pregnancy in third trimester 10/03/2021     Priority: Medium     Rh negative status during pregnancy in third trimester 2021     Priority: Medium     Adnexal cyst 2021     Priority: Medium     L ovarian complex cyst     Dating ultrasound: 3.15x2.03x1.85  Level II ultrasound: 21 mm x 30 mm x 20 mm. Mean 23.7 mm. Vol 6.597 cm^3    Follow up postpartum       History of postpartum hemorrhage, currently pregnant in third trimester 2021     Priority: Medium     Supervision of high-risk pregnancy 2021     Priority: Medium     **HR  FOB:Markie WOLF: Nov 10, 2021  B neg/Placenta/ Boy  Genetic: dec  Tdap:    Flu: 20   GBS:     Problems: AMA, RH neg Rhogam , Occult Spina bifida (MRI done), spondylolisthesis/anemia  Sib:Beverley   Avascular cyst on L ovary - FU after del  1 hr GCT/hgb: Passed 125, 10.2       Spina bifida occulta 2019     Priority: Medium     Plan for early anesthesia consult in labor       Crohn's disease without complication (H) 2019     Priority: Medium     Shiraz states she is unsure if this was a correct diagnosis; does not take any medications or see any providers for this       History of ulcer disease 2019     Priority: Medium     Spondylolisthesis 2019     Priority: Medium     L5       Cervical cancer screening 2019     Priority: Medium     Pap smear due PP           SUBJECTIVE:  Patient is stable and is tolerating acitivity well  Baby is rooming in  Complications since 2 hours post delivery: None  Pain is not well controlled.  Patient is taking pain  medications.  Breastfeeding status:initiated   Elimination:  She is voiding without difficulty.  She has not had a bowel movement  Denies heavy bleeding and passing large clots.    INTERVAL HISTORY:  BP 91/58   Pulse 65   Temp 97.9  F (36.6  C) (Oral)   Resp 16   LMP 02/03/2021 (Approximate)   SpO2 98%   Breastfeeding Yes     Constitutional: healthy, alert and no distress    Breasts: Currently breastfeeding    Fundus: Uterine fundus is firm, non-tender and at 2 cm below the level of the umbilicus    Perineum: Perineum is intact and/or well approximated, minimal swelling    Lochia: Lochia is appropriate for the duration of time since delivery.     Postpartum hemoglobin   Hemoglobin   Date Value Ref Range Status   11/14/2021 10.1 (L) 11.7 - 15.7 g/dL Final   11/14/2021 10.1 (L) 11.7 - 15.7 g/dL Final   04/13/2021 13.3 11.7 - 15.7 g/dL Final     Blood type   Lab Results   Component Value Date    ABO B 04/13/2021       Lab Results   Component Value Date    RH Neg 04/13/2021     Rubella status   Lab Results   Component Value Date    RUBELLAABIGG Immune 10/02/2018     History of depression:  no    ASSESSMENT/PLAN:  Normal postpartum course  Stable Post-partum day #1  Babe will either be named Gustavo Trejo or Ghanshyam Trejo.  Complications: (1) Retained placenta immediately after delivery; denies heavy bleeding/ cramping. (2) Rh negative; babe is also Rh negative so Rhogam was not given.  Postpartum warning s/s reviewed, including bleeding/clots, fever, mastitis & thromboemboli   Exercise, diet and rest reviewed  Continue prenatal vitamins while breastfeeding  Follow-up in 2 and 6 weeks with CNMs at [facility]  Ultrasound to evaluated the avascular cyst on left ovary at 6 week postpartum check.   Plan d/c home today    Current Discharge Medication List      START taking these medications    Details   acetaminophen (TYLENOL) 325 MG tablet Take 1-2 tablets (325-650 mg) by mouth every 4 hours  as needed for pain  Qty: 90 tablet, Refills: 0    Associated Diagnoses:  (normal spontaneous vaginal delivery)      docusate sodium (COLACE) 100 MG capsule Take 1 capsule (100 mg) by mouth 2 times daily as needed for constipation  Qty: 60 capsule, Refills: 0    Associated Diagnoses:  (normal spontaneous vaginal delivery)      ibuprofen (ADVIL/MOTRIN) 800 MG tablet Take 1 tablet (800 mg) by mouth every 8 hours as needed for other (cramping)  Qty: 90 tablet, Refills: 0    Associated Diagnoses:  (normal spontaneous vaginal delivery)         CONTINUE these medications which have NOT CHANGED    Details   Prenatal MV-Min-Fe Fum-FA-DHA (PRENATAL 1 PO) Take 1 tablet by mouth daily         STOP taking these medications       calcium carbonate (TUMS) 500 MG chewable tablet Comments:   Reason for Stopping:         cyanocobalamin (VITAMIN B-12) 1000 MCG tablet Comments:   Reason for Stopping:               JOAQUIN Snyder CNM

## 2021-11-15 NOTE — PLAN OF CARE
VSS. Fundus firm, midline U/2 with scant flow.  Pain well controlled with tylenol/ibuprofen. Up independently in room.  Working on breastfeeding  and  cares. Progressing per care plan. Continue to monitor and notify MD as needed.

## 2021-11-15 NOTE — PLAN OF CARE
Fundus firm and bleeding wnl.  VSS.  Voiding without difficulty.  Taking tylenol and ibuprofen with good relief.  Up independently.  Using ice and tucks.  Encouraged to call with questions or concerns.

## 2021-11-25 ENCOUNTER — NURSE TRIAGE (OUTPATIENT)
Dept: NURSING | Facility: CLINIC | Age: 36
End: 2021-11-25
Payer: COMMERCIAL

## 2021-11-25 NOTE — TELEPHONE ENCOUNTER
"Patient calling with postpartum bleeding.  She is 12 days postpartum and today felt a \"gush\".  She stated there was a large amount of bright red blood in toilet.(30 minutes ago)  Patient has a low-grade fever of 99.2.  Denies abdominal pain.  Disposition is home care. Patient will closely monitor bleeding, fever and pain. She will call back with any changes.  Greer Chua RN  [facility] Nurse Advisor  1:00 PM 11/25/2021    Reason for Disposition    Normal postpartum bleeding    Additional Information    Negative: Shock suspected (e.g., cold/pale/clammy skin, too weak to stand, low BP, rapid pulse)    Negative: Difficult to awaken or acting confused (e.g., disoriented, slurred speech)    Negative: Passed out (i.e., lost consciousness, collapsed and was not responding)    Negative: Sounds like a life-threatening emergency to the triager    Negative: SEVERE dizziness (e.g., unable to stand, requires support to walk, feels like passing out now)    Negative: [1] SEVERE abdominal pain AND [2] present > 1 hour    Negative: Fever > 100.4 F (38.0 C)    Negative: Patient sounds very sick or weak to the triager    Negative: [1] Constant abdominal pain AND [2] present > 2 hours    Negative: Pale skin (pallor) of new onset or worsening    Negative: MODERATE vaginal bleeding (i.e., soaking 1 pad or tampon per hour and present > 6 hours)    Negative: SEVERE vaginal bleeding (e.g., soaking 2 pads per hour and present 2 or more hours; large blood clots)    Negative: Foul smelling vaginal discharge (i.e., lochia)    Negative: Bleeding with > 6 soaked pads per day    Negative: [1] Passing blood clots  AND [2] persists > 4 days postpartum    Negative: Taking Coumadin (warfarin) or other strong blood thinner, or known bleeding disorder (e.g., thrombocytopenia)    Negative: [1] Skin bruises or nosebleed AND [2] not caused by an injury    Negative: [1] Vaginal bleeding or spotting lasts > 4 weeks AND [2] red or red-brown    " Negative: [1] Vaginal bleeding or spotting lasts > 5 weeks AND [2] pale-brown or pink    Protocols used: POSTPARTUM - VAGINAL BLEEDING AND LOCHIA-A-AH    COVID 19 Nurse Triage Plan/Patient Instructions    Please be aware that novel coronavirus (COVID-19) may be circulating in the community. If you develop symptoms such as fever, cough, or SOB or if you have concerns about the presence of another infection including coronavirus (COVID-19), please contact your health care provider or visit [email or web address].     Disposition/Instructions    Home care recommended. Follow home care protocol based instructions.    Thank you for taking steps to prevent the spread of this virus.  o Limit your contact with others.  o Wear a simple mask to cover your cough.  o Wash your hands well and often.    Resources    [facility]: About COVID-19: [email or web address]    CDC: What to Do If You're Sick: www.cdc.gov/coronavirus/2019-ncov/about/steps-when-sick.html    CDC: Ending Home Isolation: www.cdc.gov/coronavirus/2019-ncov/hcp/disposition-in-home-patients.html     CDC: Caring for Someone: www.cdc.gov/coronavirus/2019-ncov/if-you-are-sick/care-for-someone.html     [facility]: Interim Guidance for Hospital Discharge to Home: www.health.[facility].[place]/diseases/coronavirus/hcp/hospdischarge.pdf    [facility] clinical trials (COVID-19 research studies): clinicalaffairs.[facility]-clinical-trials     Below are the COVID-19 hotlines at the [place] Department of Health ([facility]). Interpreters are available.   o For health questions: Call 509-889-0952 or 1-518.807.9160 (7 a.m. to 7 p.m.)  o For questions about schools and childcare: Call 805-838-2828 or 1-221.911.2683 (7 a.m. to 7 p.m.)

## 2021-12-02 DIAGNOSIS — N61.0 MASTITIS: Primary | ICD-10-CM

## 2021-12-02 RX ORDER — DICLOXACILLIN SODIUM 500 MG
500 CAPSULE ORAL 4 TIMES DAILY
Qty: 40 CAPSULE | Refills: 0 | Status: SHIPPED | OUTPATIENT
Start: 2021-12-02 | End: 2021-12-13

## 2021-12-02 NOTE — TELEPHONE ENCOUNTER
Patient is experiencing low grade fevers and breast pain/tenderness. Would like to talk to nurse about symptoms, able to leave voicemail if unavailable

## 2021-12-02 NOTE — TELEPHONE ENCOUNTER
Pt informed rx has been sent.   If no improvement over the next few days call to make an OV  Jessy Gregory RN on 12/2/2021 at 11:39 AM

## 2021-12-02 NOTE — TELEPHONE ENCOUNTER
Area on the underside of R breast painful.   Is painful when full and also when emptied after feeding.  Has been going on for a week, now this morning has had temp 100.5 with muscle aches.   Nipples intact, not sore or cracked    Discussed continue nursing on both breasts-fully drain both breasts, push fluids, changing the position you use to breast feed from one feeding to the next, proper latch. Reviewed heat prior to feeds, ice after, ibuprofen for pain and inflammation, can also take tylenol.    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   Will consult with midwives to see if antibiotics for suspected mastitis are indicated.   Jessy Gregory RN on 12/2/2021 at 10:44 AM

## 2021-12-13 ENCOUNTER — VIRTUAL VISIT (OUTPATIENT)
Dept: MIDWIFE SERVICES | Facility: CLINIC | Age: 36
End: 2021-12-13
Payer: COMMERCIAL

## 2021-12-13 PROBLEM — M43.10 SPONDYLOLISTHESIS: Status: RESOLVED | Noted: 2019-01-17 | Resolved: 2021-12-13

## 2021-12-13 PROCEDURE — 99207 PR POST PARTUM EXAM: CPT | Performed by: ADVANCED PRACTICE MIDWIFE

## 2021-12-13 NOTE — PROGRESS NOTES
"Shiraz Boykin is a 36 year old female who is being evaluated via a billable telephone visit.      What phone number would you like to be contacted at? [phone]  How would you like to obtain your AVS? Rosyhart      SUBJECTIVE:                                                   Shiraz Boykin is a 36 year old female who presents for virtual visit today for the following health issue(s):  Patient presents with:  Post-partum Follow Up Call    [facility]  [id number], viable baby boy \"Rolette\"  1st/labial, ret placenta    Had mastitis, finished abx and feels much better. No other concerns.         Patient's last menstrual period was 2021 (approximate)..     Patient is not sexually active, .  Using none for contraception.    reports that she has never smoked. She has never used smokeless tobacco.    Health maintenance updated:  yes    SUBJECTIVE: Shiraz Boykin  is here for a 6-week postpartum checkup.    Patient Active Problem List   Diagnosis     Spina bifida occulta     Crohn's disease without complication (H)     History of ulcer disease     Cervical cancer screening     Supervision of high-risk pregnancy     Adnexal cyst     History of postpartum hemorrhage, currently pregnant in third trimester     Rh negative status during pregnancy in third trimester     Anemia during pregnancy in third trimester     History of retained placenta     Lactating mother     Past Medical History:   Diagnosis Date     Anemia due to blood loss, acute 2019     Crohn's disease (H)      Excessive postpartum bleeding      Rh negative state in antepartum period, third trimester 2019     Spina bifida occulta 2009     Spondylisthesis 2009     Third-stage postpartum hemorrhage 2019     Tremor 2017     Ulcer of jejunum 10/2008     Varicella      Current Outpatient Medications   Medication Sig Dispense Refill     ibuprofen (ADVIL/MOTRIN) 800 MG tablet Take 1 tablet (800 mg) by mouth every 8 hours as needed for other " (cramping) 90 tablet 0     Prenatal MV-Min-Fe Fum-FA-DHA (PRENATAL 1 PO) Take 1 tablet by mouth daily       ROS:  Delivery date was 21. She had a  of a viable boy, weight 8 pounds 7 oz., with retained placenta.   She is  breast feeding without problems  and no signs of mastitis currently.  Is just completing antibiotics for a previous episode of mastitis. . Bleeding  has not stopped but there are no signs or symptoms of endometritis and the bleeding is slowing significantly  Feels her mood is good and more positive than her previous post partum time.   Denies stress incontinence.  No constipation, pain or bleeding with bowel movements.       EXAM:  LMP 2021 (Approximate)     ASSESSMENT:   (Z39.2) Routine postpartum follow-up  (primary encounter diagnosis)  Comment:   Plan:           PLAN:  .  Signs, symptoms and preventative measures for mastitis were discussed and the patient will call with aches, fever of 101 or higher and flu like symptoms.  Contraception is condoms and then pills.   Is planning on getting her COVID booster.   JOAQUIN Freedman, CNM

## 2021-12-18 NOTE — PROGRESS NOTES
Midwife Postpartum 6 Week Visit    Shiraz Boykin is a 36 year old here for a postpartum checkup.  Beverley is adjusting well, sometimes a little too loving on her brother but they have a pack and play, and crib for separation. Shiraz has 16 weeks off. Unsure about more kids in the future, one random episode of bleeding, other than that has felt well, also had mastitis that is now resolved, still breastfeeding.    Delivery date was 21. She had a  of a viable boy, named Gustavo, weight 8 pounds 7 oz., with no complications, retained placenta > 30 minutes.    Since delivery, she has been breast feeding. She has had signs of infection, mastitis at 12 days postpartum, no issues since then. Her lochia stopped after 4 weeks. She has not had other complications.      She is voiding and having bowel movements without difficulty.       Contraception was discussed and patient desires condoms.   She  has not had intercourse since delivery.  She complains of no perineal discomfort.     Mood is Stable  Patient screened for postpartum depression.   Depression Rating was:   Last PHQ-9 score on record =   PHQ-9 SCORE 2021   PHQ-9 Total Score 0     Last GAD7 score on record =   TED-7 SCORE 2021   Total Score 0     Alcohol Score = 2    ROS:  12 point review of systems negative other than symptoms noted below or in the HPI.       Current Outpatient Medications:      ibuprofen (ADVIL/MOTRIN) 800 MG tablet, Take 1 tablet (800 mg) by mouth every 8 hours as needed for other (cramping), Disp: 90 tablet, Rfl: 0     Prenatal MV-Min-Fe Fum-FA-DHA (PRENATAL 1 PO), Take 1 tablet by mouth daily, Disp: , Rfl: .   OB History    Para Term  AB Living   2 2 2 0 0 2   SAB IAB Ectopic Multiple Live Births   0 0 0 0 2      # Outcome Date GA Lbr Olivier/2nd Weight Sex Delivery Anes PTL Lv   2 Term 21 40w3d / 01:02 3.85 kg (8 lb 7.8 oz) M Vag-Spont EPI, TOPICAL N JER      Complications: Retained placenta without hemorrhage  "     Name: Gustavo      Apgar1: 8  Apgar5: 9   1 Term 19 39w1d 24:55 / 03:51 3.13 kg (6 lb 14.4 oz) F Vag-Spont EPI, Nitrous N JER      Complications: Prolonged PROM (>18 hours), Hemorrhage      Name: Minoo      Apgar1: 9  Apgar5: 9     Last pap:    Lab Results   Component Value Date    PAP NIL 2019       EXAM:  /62   Ht 1.613 m (5' 3.5\")   Wt 61.2 kg (135 lb)   LMP 2021 (Approximate)   BMI 23.54 kg/m    BMI: Body mass index is 23.54 kg/m .  Constitutional: healthy, alert and no distress  Neck: symmetrical, thyroid normal size, no masses present, no lymphadenopathy present.   Breast: deferred, patient lactating.  Abdomen: soft, non-tender, diastasis 0 FB's  PELVIC EXAM:  Vulva: No lesions, well healed, BUS WNL, no tenderness  Vagina: Moist, pink, discharge normal  well rugated, no lesions  Cervix:smooth, pink, no visible lesions  Uterus: Involuted to normal size, non-tender, no masses palpated  Ovaries: No masses palpated  Pelvic tone: weak  Rectal exam: deferred    ASSESSMENT:   Normal postpartum exam after .    ICD-10-CM    1. Routine postpartum follow-up  Z39.2          PLAN:  Return as needed or at time of next expected pap, pelvic, or breast exam.  Teaching: exercise, birth control, preconception, mental health and weight/diet  Family Planning:condoms  Encourage Kegels and abdominal exercise. Discussed pelvic floor Physical therapy as option if desires  Continue a multivitamin/prenatal supplement, especially if breastfeeding.  Pap smear was not obtained today.  Postpartum Hgb was not done today.    Return to clinic:  1 year or sooner if problems arise    JOAQUIN Powell, SARBJIT      "

## 2021-12-28 ENCOUNTER — PRENATAL OFFICE VISIT (OUTPATIENT)
Dept: MIDWIFE SERVICES | Facility: CLINIC | Age: 36
End: 2021-12-28
Payer: COMMERCIAL

## 2021-12-28 VITALS
DIASTOLIC BLOOD PRESSURE: 62 MMHG | HEIGHT: 64 IN | WEIGHT: 135 LBS | BODY MASS INDEX: 23.05 KG/M2 | SYSTOLIC BLOOD PRESSURE: 104 MMHG

## 2021-12-28 PROBLEM — Z67.91 RH NEGATIVE STATUS DURING PREGNANCY IN THIRD TRIMESTER: Status: RESOLVED | Noted: 2021-09-14 | Resolved: 2021-12-28

## 2021-12-28 PROBLEM — O09.90 SUPERVISION OF HIGH-RISK PREGNANCY: Status: RESOLVED | Noted: 2021-04-06 | Resolved: 2021-12-28

## 2021-12-28 PROBLEM — O26.893 RH NEGATIVE STATUS DURING PREGNANCY IN THIRD TRIMESTER: Status: RESOLVED | Noted: 2021-09-14 | Resolved: 2021-12-28

## 2021-12-28 PROBLEM — O09.293 HISTORY OF POSTPARTUM HEMORRHAGE, CURRENTLY PREGNANT IN THIRD TRIMESTER: Status: RESOLVED | Noted: 2021-04-13 | Resolved: 2021-12-28

## 2021-12-28 PROCEDURE — 99207 PR POST PARTUM EXAM: CPT | Performed by: ADVANCED PRACTICE MIDWIFE

## 2021-12-28 ASSESSMENT — ANXIETY QUESTIONNAIRES
7. FEELING AFRAID AS IF SOMETHING AWFUL MIGHT HAPPEN: NOT AT ALL
GAD7 TOTAL SCORE: 0
3. WORRYING TOO MUCH ABOUT DIFFERENT THINGS: NOT AT ALL
5. BEING SO RESTLESS THAT IT IS HARD TO SIT STILL: NOT AT ALL
1. FEELING NERVOUS, ANXIOUS, OR ON EDGE: NOT AT ALL
2. NOT BEING ABLE TO STOP OR CONTROL WORRYING: NOT AT ALL
6. BECOMING EASILY ANNOYED OR IRRITABLE: NOT AT ALL

## 2021-12-28 ASSESSMENT — PATIENT HEALTH QUESTIONNAIRE - PHQ9
5. POOR APPETITE OR OVEREATING: NOT AT ALL
SUM OF ALL RESPONSES TO PHQ QUESTIONS 1-9: 0

## 2021-12-28 ASSESSMENT — MIFFLIN-ST. JEOR: SCORE: 1284.42

## 2021-12-29 ASSESSMENT — ANXIETY QUESTIONNAIRES: GAD7 TOTAL SCORE: 0

## 2022-02-17 PROBLEM — O09.93 SUPERVISION OF HIGH RISK PREGNANCY IN THIRD TRIMESTER: Status: RESOLVED | Noted: 2019-01-17 | Resolved: 2019-06-13

## 2022-05-26 NOTE — PATIENT INSTRUCTIONS
05/26/22 0200   C-SSRS (Frequent Screen)   2. Have you actually had any thoughts of killing yourself? No  (patient sleeping)   Suicide Evaluation Negative screen= no ideation, behaviors or history   Patient sleeping for C-SSRS assessment. Maintain current plan of care.   "Labor Instructions for Midwife Patients    When to call:  Both during and after office hours call  [phone]. There is a triage RN to take your calls and answer your questions 24 hours a day.  If they cannot answer your question they will page the midwife on call for you.    When to call:  Call anytime you have important concerns about you or your baby.     Call if:    You are having contractions at regular intervals about 5-6 minutes apart lasting 30-60 seconds and becoming increasingly more intense     You have an uncontrollable gush of fluid from your vagina or feel a pop and gush like your water has broken    You have HEAVY bleeding, like heavy period, blood running down your legs, or  soaking a pad.     Some bleeding after a pelvic exam, after intercourse, or in labor when your cervix is dilating is normal and is referred to as \"bloody show\"    You have severe, continuous back or abdominal pain    You feel it is time to go to the hospital    If this is your first labor, call when contractions are very intense and have been about every 3-4 minutes for about an hour    If it is your second labor or more, call when contractions are strong and about every 3-5 minutes or sooner depending on your level of discomfort.     Keep in mind we are always here for you! If you have questions, concerns please don't hesitate to call us.     What to eat/drink in labor: Drink plenty of fluid (water most importantly, juice, soda or tea without caffeine). Eat rice, pasta, soup, cereal, bread/toast, and fruit. Avoid dairy and greasy food as they are difficult to digest and you may experience some nausea during labor.    Comfort measures:    Baths and showers (ok even with ruptured membranes, it may temporarily slow contractions if you are still in the early stage of labor)    Warm/hot packs for back pain or discomfort    Back, belly, or thigh massages    Standing, rocking, walking, leaning over bed or tables, side-lying and " "sleeping    Miscellaneous:     Contractions are timed from the beginning of one to the beginning of the next    Try hard to sleep during the early stage of labor when you are not that uncomfortable. Timing of contractions at this point is not important    Even if you cannot sleep, resting in bed or on the couch can help you maintain your energy for labor    When you arrive at the hospital the nurse will check your baby's heartbeat, check your cervix, and will call us. The midwife on call will come in and be with you when you are in active labor    After hours you need to enter the hospital through the emergency room    PREECLAMPSIA SIGNS AND SYMPTOMS    Preeclampsia is a dangerous condition that some women develop in the second half of pregnancy. It can also begin after the baby is born.  Preeclampsia causes high blood pressure and can cause problems with many organ systems in your body.  It can also affect the growth of your baby. The exact cause of preeclampsia is unknown, however, there are signs and symptoms to watch for:    -A bad headache that doesn't improve with Tylenol  -Visual changes such as spots, flashes of light, blurry vision  -Pain in the upper right part of your abdomen, especially under the ribs that doesn't go away  -Nausea and/or vomiting  -Feeling extremely tired  -Yellowing of the skin and/or eyes  -Feeling \"not quite right\" or that something is wrong  -An extreme amount of swelling (some swelling in pregnancy is very normal)    If your midwife feels that you are developing preeclampsia, you will have lab tests drawn and will be monitored very closely.     If you are experiencing anyof these symptoms, call the [facility] immediately at [phone].    Fetal Kick Counts    It is important to know when your baby's movements occur. We often get busy with work and life and do not pay close attention to their movements.        Women typically begin feeling movement between " 18-22 weeks of gestation, sometimes it can be earlier or later depending on where your placenta is       Movements usually begin feeling like popping or fluttering and as the baby grows they become more pronounced    Toward the end of pregnancy as the baby gets larger they may not move as much or make as big of movements. Babies have maturing sleep cycles as well as not as much room to move and flip. If you are ever concerned about your baby's movements or have not felt the baby move for a while, we recommend you do a fetal kick count. Prior to starting your count drink a glass of water or juice and eat a snack. Then lay down on your side and begin to count movements.     How to do a Fetal Kick Counts    There are many different ways to monitor your baby's movements. Movements can range from large jabs to small kicks, or wiggles.  Hiccups count!      Count 10 movements in 2 hours when resting and focusing    Count 10 movements in 12 hours when doing normal activity    We recommend that if movements occur but seem decreased that you should be seen in the clinic or hospital for evaluation within 12 hours. If fetal movement is absent or fetal kick counts are low please contact us right away.    If you ever have any concerns about your baby's movements DO NOT HESITATE to call us, we are here for you!    [facility] for [facility]  [phone]

## 2022-08-19 ENCOUNTER — OFFICE VISIT (OUTPATIENT)
Dept: FAMILY MEDICINE | Facility: CLINIC | Age: 37
End: 2022-08-19
Payer: COMMERCIAL

## 2022-08-19 VITALS
DIASTOLIC BLOOD PRESSURE: 72 MMHG | TEMPERATURE: 98.2 F | OXYGEN SATURATION: 97 % | RESPIRATION RATE: 16 BRPM | HEART RATE: 72 BPM | SYSTOLIC BLOOD PRESSURE: 124 MMHG

## 2022-08-19 DIAGNOSIS — H10.31 ACUTE BACTERIAL CONJUNCTIVITIS OF RIGHT EYE: Primary | ICD-10-CM

## 2022-08-19 PROCEDURE — 99213 OFFICE O/P EST LOW 20 MIN: CPT

## 2022-08-19 RX ORDER — SULFAMETHOXAZOLE/TRIMETHOPRIM 800-160 MG
1 TABLET ORAL 2 TIMES DAILY
Qty: 6 TABLET | Refills: 0 | Status: CANCELLED | OUTPATIENT
Start: 2022-08-19 | End: 2022-08-22

## 2022-08-19 RX ORDER — FLUCONAZOLE 150 MG/1
150 TABLET ORAL ONCE
Qty: 1 TABLET | Refills: 0 | Status: CANCELLED | OUTPATIENT
Start: 2022-08-19 | End: 2022-08-19

## 2022-08-19 RX ORDER — OFLOXACIN 3 MG/ML
1-2 SOLUTION/ DROPS OPHTHALMIC
Qty: 7 ML | Refills: 0 | Status: SHIPPED | OUTPATIENT
Start: 2022-08-19 | End: 2022-08-26

## 2022-08-19 ASSESSMENT — ENCOUNTER SYMPTOMS
PHOTOPHOBIA: 0
EYE REDNESS: 1
CONSTITUTIONAL NEGATIVE: 1
EYE DISCHARGE: 1
NEUROLOGICAL NEGATIVE: 1

## 2022-09-07 ENCOUNTER — LAB (OUTPATIENT)
Dept: URGENT CARE | Facility: URGENT CARE | Age: 37
End: 2022-09-07
Attending: FAMILY MEDICINE
Payer: COMMERCIAL

## 2022-09-07 DIAGNOSIS — Z20.822 SUSPECTED 2019 NOVEL CORONAVIRUS INFECTION: ICD-10-CM

## 2022-09-07 PROCEDURE — U0003 INFECTIOUS AGENT DETECTION BY NUCLEIC ACID (DNA OR RNA); SEVERE ACUTE RESPIRATORY SYNDROME CORONAVIRUS 2 (SARS-COV-2) (CORONAVIRUS DISEASE [COVID-19]), AMPLIFIED PROBE TECHNIQUE, MAKING USE OF HIGH THROUGHPUT TECHNOLOGIES AS DESCRIBED BY CMS-2020-01-R: HCPCS

## 2022-09-07 PROCEDURE — U0005 INFEC AGEN DETEC AMPLI PROBE: HCPCS

## 2022-09-08 LAB — SARS-COV-2 RNA RESP QL NAA+PROBE: NEGATIVE

## 2022-09-18 ENCOUNTER — HEALTH MAINTENANCE LETTER (OUTPATIENT)
Age: 37
End: 2022-09-18

## 2023-01-28 ENCOUNTER — HEALTH MAINTENANCE LETTER (OUTPATIENT)
Age: 38
End: 2023-01-28

## 2023-06-30 ENCOUNTER — OFFICE VISIT (OUTPATIENT)
Dept: FAMILY MEDICINE | Facility: CLINIC | Age: 38
End: 2023-06-30
Payer: COMMERCIAL

## 2023-06-30 VITALS
SYSTOLIC BLOOD PRESSURE: 107 MMHG | TEMPERATURE: 97.3 F | HEIGHT: 63 IN | RESPIRATION RATE: 14 BRPM | BODY MASS INDEX: 23.07 KG/M2 | HEART RATE: 65 BPM | DIASTOLIC BLOOD PRESSURE: 73 MMHG | OXYGEN SATURATION: 100 % | WEIGHT: 130.2 LBS

## 2023-06-30 DIAGNOSIS — B08.4 HAND, FOOT AND MOUTH DISEASE: Primary | ICD-10-CM

## 2023-06-30 PROCEDURE — 99213 OFFICE O/P EST LOW 20 MIN: CPT | Performed by: NURSE PRACTITIONER

## 2023-06-30 RX ORDER — LIDOCAINE HYDROCHLORIDE 20 MG/ML
15 SOLUTION OROPHARYNGEAL
Qty: 100 ML | Refills: 0 | Status: SHIPPED | OUTPATIENT
Start: 2023-06-30

## 2023-06-30 RX ORDER — PREDNISONE 20 MG/1
40 TABLET ORAL DAILY
Qty: 10 TABLET | Refills: 0 | Status: SHIPPED | OUTPATIENT
Start: 2023-06-30 | End: 2023-07-05

## 2023-06-30 NOTE — PROGRESS NOTES
Assessment & Plan     Hand, foot and mouth disease  Treat as below since slow to resolve. Follow up if ongoing.  - predniSONE (DELTASONE) 20 MG tablet  Dispense: 10 tablet; Refill: 0  - lidocaine, viscous, (XYLOCAINE) 2 % solution  Dispense: 100 mL; Refill: 0      Prescription drug management        Kerry Paez, CNP  [facility]    Abrahan Weldon is a 38 year old, presenting for the following health issues:  No chief complaint on file.        6/30/2023     8:33 AM   Additional Questions   Roomed by Ellen Conteh   Accompanied by Self     History of Present Illness       Reason for visit:  I've had a sore throat since June 22 that has turned into sores in my mouth and throat. We were exposed to hand foot and mouth the previous weekend.  Symptom onset:  3-7 days ago  Symptoms include:  Sores in my throat and mouth. Red pin point spots on fingers.  Symptom intensity:  Moderate  Symptom progression:  Staying the same  Had these symptoms before:  No  What makes it worse:  Talking and eating  What makes it better:  Ibuprofen sometimes takes the edge off    She eats 2-3 servings of fruits and vegetables daily.She consumes 0 sweetened beverage(s) daily.She exercises with enough effort to increase her heart rate 20 to 29 minutes per day.  She exercises with enough effort to increase her heart rate 4 days per week.   She is taking medications regularly.           Review of Systems   Constitutional, HEENT, cardiovascular, pulmonary, GI, , musculoskeletal, neuro, skin, endocrine and psych systems are negative, except as otherwise noted.      Objective    There were no vitals taken for this visit.  There is no height or weight on file to calculate BMI.  Physical Exam   GENERAL: healthy, alert and no distress  HENT: normal cephalic/atraumatic, ear canals and TM's normal, oral mucous membranes moist and mouth and tongue ulcers consistent with hand foot mouth.  NECK: bilateral anterior cervical  adenopathy, no asymmetry, masses, or scars and thyroid normal to palpation  RESP: lungs clear to auscultation - no rales, rhonchi or wheezes  CV: regular rate and rhythm, normal S1 S2, no S3 or S4, no murmur, click or rub, no peripheral edema and peripheral pulses strong  ABDOMEN: soft, nontender, no hepatosplenomegaly, no masses and bowel sounds normal  MS: no gross musculoskeletal defects noted, no edema              KERRIE Baumann     [address] [address]  [email or web address]   Office: [phone]

## 2024-02-25 ENCOUNTER — HEALTH MAINTENANCE LETTER (OUTPATIENT)
Age: 39
End: 2024-02-25

## 2025-03-15 ENCOUNTER — HEALTH MAINTENANCE LETTER (OUTPATIENT)
Age: 40
End: 2025-03-15

## (undated) RX ORDER — LIDOCAINE HYDROCHLORIDE 20 MG/ML
INJECTION, SOLUTION EPIDURAL; INFILTRATION; INTRACAUDAL; PERINEURAL
Status: DISPENSED
Start: 2021-11-13